# Patient Record
Sex: FEMALE | Race: WHITE | Employment: FULL TIME | URBAN - METROPOLITAN AREA
[De-identification: names, ages, dates, MRNs, and addresses within clinical notes are randomized per-mention and may not be internally consistent; named-entity substitution may affect disease eponyms.]

---

## 2020-01-10 ENCOUNTER — OFFICE VISIT (OUTPATIENT)
Dept: FAMILY MEDICINE CLINIC | Age: 50
End: 2020-01-10

## 2020-01-10 VITALS
HEART RATE: 99 BPM | RESPIRATION RATE: 18 BRPM | WEIGHT: 281.6 LBS | DIASTOLIC BLOOD PRESSURE: 81 MMHG | SYSTOLIC BLOOD PRESSURE: 102 MMHG | HEIGHT: 65 IN | TEMPERATURE: 96 F | OXYGEN SATURATION: 99 % | BODY MASS INDEX: 46.92 KG/M2

## 2020-01-10 DIAGNOSIS — M25.551 RIGHT HIP PAIN: ICD-10-CM

## 2020-01-10 DIAGNOSIS — Z87.39 HISTORY OF SPINAL STENOSIS: ICD-10-CM

## 2020-01-10 DIAGNOSIS — E66.01 OBESITY, MORBID (HCC): ICD-10-CM

## 2020-01-10 DIAGNOSIS — Z00.00 ROUTINE GENERAL MEDICAL EXAMINATION AT A HEALTH CARE FACILITY: ICD-10-CM

## 2020-01-10 DIAGNOSIS — Z76.89 ENCOUNTER TO ESTABLISH CARE: Primary | ICD-10-CM

## 2020-01-10 DIAGNOSIS — Z12.39 BREAST CANCER SCREENING: ICD-10-CM

## 2020-01-10 LAB — HBA1C MFR BLD HPLC: 5.7 %

## 2020-01-10 RX ORDER — IBUPROFEN 200 MG
TABLET ORAL
COMMUNITY
End: 2020-08-19 | Stop reason: ALTCHOICE

## 2020-01-10 RX ORDER — DICLOFENAC SODIUM 75 MG/1
75 TABLET, DELAYED RELEASE ORAL 2 TIMES DAILY
Qty: 60 TAB | Refills: 1 | Status: SHIPPED | OUTPATIENT
Start: 2020-01-10 | End: 2020-03-17 | Stop reason: SDUPTHER

## 2020-01-10 NOTE — PROGRESS NOTES
HISTORY OF PRESENT ILLNESS  Mica Avila is a 52 y.o. female. HPI  Patient comes in today to establish care. PCP - Keely Craig. GYN - none. States she is constantly gaining weight. 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 She tries to eat healthy. Watches calories, carbohydrates. Has bene on multiple diets, used [employer] to monitor weight. Does not recall having thyroid levels checked. Can't walk due to pain in right hip. Walks with a limp, cannot walk normal.  Hx of spinal stenosis. Cannot lift leg up while sitting in chair. She uses Advil in mornings. Has a stinging, burning sensation in hips. Cannot do a lunge. States he heard something pop when she was sitting in a \"w\" position in floor couple years ago. States she went to ED, was told pulled muscle. Hip pain progressively gotten worse since then. She used to walk 3 miles daily. Unable to use exercise bike for very long. Has not had MRI done. Thinks she had a CT scan. She has done PT for hip pain. Pt has not been to orthopedic in a while.    Allergies   Allergen Reactions    Other Food Swelling     Allergy to jicama root       Past Medical History:   Diagnosis Date    Spinal stenosis 2016       Past Surgical History:   Procedure Laterality Date    HX LAPAROSCOPIC SUPRACERVICAL HYSTERECTOMY  2013       Social History     Socioeconomic History    Marital status: SINGLE     Spouse name: Not on file    Number of children: Not on file    Years of education: Not on file    Highest education level: Not on file   Occupational History    Not on file   Social Needs    Financial resource strain: Not on file    Food insecurity:     Worry: Not on file     Inability: Not on file    Transportation needs:     Medical: Not on file     Non-medical: Not on file   Tobacco Use    Smoking status: Never Smoker    Smokeless tobacco: Never Used   Substance and Sexual Activity    Alcohol use: Yes     Comment: occasionally    Drug use: No    Sexual activity: Yes     Partners: Male     Birth control/protection: Surgical   Lifestyle    Physical activity:     Days per week: Not on file     Minutes per session: Not on file    Stress: Not on file   Relationships    Social connections:     Talks on phone: Not on file     Gets together: Not on file     Attends [employer] service: Not on file     Active member of club or organization: Not on file     Attends meetings of clubs or organizations: Not on file     Relationship status: Not on file    Intimate partner violence:     Fear of current or ex partner: Not on file     Emotionally abused: Not on file     Physically abused: Not on file     Forced sexual activity: Not on file   Other Topics Concern    Not on file   Social History Narrative    Not on file       Family History   Problem Relation Age of Onset    Breast Cancer Maternal Aunt        Current Outpatient Medications   Medication Sig    ibuprofen (ADVIL) 200 mg tablet Take  by mouth. 4-6 Daily.  diclofenac EC (VOLTAREN) 75 mg EC tablet Take 1 Tab by mouth two (2) times a day. As needed for hip     No current facility-administered medications for this visit. Review of Systems   Constitutional: Negative for chills, diaphoresis, fever, malaise/fatigue and weight loss. HENT: Negative for congestion, ear pain, sore throat and tinnitus. Eyes: Negative for blurred vision and double vision. Respiratory: Negative for cough, sputum production, shortness of breath and wheezing. Cardiovascular: Negative for chest pain, palpitations and leg swelling. Gastrointestinal: Negative for abdominal pain, blood in stool, constipation, diarrhea, nausea and vomiting. Genitourinary: Negative for dysuria, flank pain, frequency, hematuria and urgency. Musculoskeletal: Positive for joint pain (bilateral hip pain, right worse than left). Negative for back pain and myalgias. Skin: Negative.     Neurological: Negative for dizziness, tingling, sensory change, speech change, focal weakness and headaches. Psychiatric/Behavioral: Negative for depression. The patient is not nervous/anxious and does not have insomnia. Vitals:    01/10/20 0858   BP: 102/81   Pulse: 99   Resp: 18   Temp: 96 °F (35.6 °C)   TempSrc: Oral   SpO2: 99%   Weight: 281 lb 9.6 oz (127.7 kg)   Height: 5' 4.5\" (1.638 m)     Physical Exam  Vitals signs reviewed. Constitutional:       Appearance: Normal appearance. She is well-developed. HENT:      Right Ear: Hearing, tympanic membrane, ear canal and external ear normal.      Left Ear: Hearing, tympanic membrane, ear canal and external ear normal.      Nose: Nose normal.      Mouth/Throat:      Pharynx: Uvula midline. Neck:      Thyroid: No thyromegaly. Cardiovascular:      Rate and Rhythm: Normal rate and regular rhythm. Pulses:           Dorsalis pedis pulses are 2+ on the right side and 2+ on the left side. Heart sounds: Normal heart sounds, S1 normal and S2 normal. No murmur. Comments: No edema noted  Pulmonary:      Effort: Pulmonary effort is normal.      Breath sounds: Normal breath sounds. Abdominal:      General: Bowel sounds are normal.      Palpations: Abdomen is soft. Tenderness: There is no tenderness. Musculoskeletal:      Right hip: She exhibits decreased range of motion (with forward flexion, abduction and adduction), decreased strength, tenderness (over trochanteric bursa) and bony tenderness. She exhibits no swelling, no crepitus and no deformity. Left hip: She exhibits decreased range of motion, tenderness and bony tenderness. She exhibits normal strength, no swelling, no crepitus and no deformity. Lumbar back: Normal.      Comments: NV/sensation intact, muscle strength 4/5 right leg. Patient walks with limp   Lymphadenopathy:      Cervical: No cervical adenopathy. Upper Body:      Right upper body: No supraclavicular adenopathy.       Left upper body: No supraclavicular adenopathy. Skin:     General: Skin is warm and dry. Neurological:      Mental Status: She is alert and oriented to person, place, and time. Psychiatric:         Speech: Speech normal.         Behavior: Behavior normal. Behavior is cooperative. Thought Content: Thought content normal.     ASSESSMENT and PLAN    ICD-10-CM ICD-9-CM    1. Encounter to establish care Z76.89 V65.8    2. Routine general medical examination at a health care facility Z00.00 V70.0 CBC WITH AUTOMATED DIFF      METABOLIC PANEL, COMPREHENSIVE      TSH AND FREE T4      LIPID PANEL      AMB POC HEMOGLOBIN A1C      VITAMIN D, 25 HYDROXY      CANCELED: AMB POC URINALYSIS DIP STICK AUTO W/O MICRO   3. Obesity, morbid (HCC) E66.01 278.01 TSH AND FREE T4      LIPID PANEL      AMB POC HEMOGLOBIN A1C      VITAMIN D, 25 HYDROXY   4. Right hip pain M25.551 719.45 XR HIP RT W OR WO PELV 2-3 VWS      REFERRAL TO ORTHOPEDIC SURGERY      diclofenac EC (VOLTAREN) 75 mg EC tablet   5. History of spinal stenosis Z87.39 V13.59    6. Breast cancer screening Z12.39 V76.10 ALO MAMMO BI SCREENING INCL CAD     Encounter Diagnoses   Name Primary?  Encounter to establish care Yes    Routine general medical examination at a health care facility     Obesity, morbid ([address]     Right hip pain     History of spinal stenosis     Breast cancer screening      Orders Placed This Encounter    ALO MAMMO BI SCREENING INCL CAD    XR HIP RT W OR WO PELV 2-3 VWS    CBC WITH AUTOMATED DIFF    METABOLIC PANEL, COMPREHENSIVE    TSH AND FREE T4    LIPID PANEL    VITAMIN D, 25 HYDROXY    CVD REPORT    REFERRAL TO ORTHOPEDIC SURGERY    AMB POC HEMOGLOBIN A1C    ibuprofen (ADVIL) 200 mg tablet    diclofenac EC (VOLTAREN) 75 mg EC tablet     Diagnoses and all orders for this visit:    1. Encounter to establish care - will send medical release for records from previous PCP    2.  Routine general medical examination at a health care facility  -     CBC WITH AUTOMATED DIFF  -     METABOLIC PANEL, COMPREHENSIVE  -     TSH AND FREE T4  -     LIPID PANEL  -     AMB POC HEMOGLOBIN A1C  -     VITAMIN D, 25 HYDROXY    3. Obesity, morbid (HCC)  -     TSH AND FREE T4  -     LIPID PANEL  -     AMB POC HEMOGLOBIN A1C  -     VITAMIN D, 25 HYDROXY  -     - Provided nutritional and dietary counseling. Encouraged patient to drink water, avoid Liquid calories. Encouraged patient to make healthy dietary changes, encouraged patient to increase fruits/veggies, avoidance of fast foods, fried foods.  Encouraged patient to follow low carbohydrate diet,         - Discussed behavior modification. Discussed with patient to make small, achievable changes for better success with weight loss.  Aim for 1-2 pounds per week weight loss        - Encouraged patient to increase exercise.    -Patient to make small changes in diet and behavior, and to increase exercise. 4. Right hip pain - xray shows 1. New severe bilateral hip osteoarthritis. New right hip subluxation. 2. Given the sclerosis in the femoral heads, consider osteonecrosis as the underlying etiology. 3. No acute fracture. Chronic stress reaction in the bilateral femoral necks - patient has scheduled appt with ortho - Dr. Lisette Escamilla on 1/15/20  -     XR HIP RT W OR WO PELV 2-3 VWS; Future  -     REFERRAL TO ORTHOPEDIC SURGERY  -     diclofenac EC (VOLTAREN) 75 mg EC tablet; Take 1 Tab by mouth two (2) times a day. As needed for hip    5. History of spinal stenosis    6. Breast cancer screening  -     [facility] MAMMO BI SCREENING INCL CAD;  Future    Other orders  -     CVD REPORT      Follow-up and Dispositions    · Return in about 1 year (around 1/10/2021), or if symptoms worsen or fail to improve.       lab results and schedule of future lab studies reviewed with patient  reviewed diet, exercise and weight control  radiology results and schedule of future radiology studies reviewed with patient    I have reviewed the patient's allergies and made any necessary changes. Medical, procedural, social and family histories have been reviewed and updated as medically indicated. I have reconciled and/or revised patient medications in the EMR. I have discussed each diagnosis listed in this note with [place]rier and/or their family. I have discussed treatment options and the risk/benefit analysis of those options, including safe use of medications and possible medication side effects. Through the use of shared decision making we have agreed to the above plan. The patient has received an after-visit summary and questions were answered concerning future plans. Mica Grimes, SABASP-C    This note will not be viewable in My Point...Exactlyt.

## 2020-01-10 NOTE — PROGRESS NOTES
Chief Complaint   Patient presents with   [facility].     PCP - hillary     Pt's main concern is  with not being able to walk well with more right hip pain. Pt has not been to orthopedic in a while. Pt concerned with weight gain. Pt thinks due to hysterectomy/hormones. 1. Have you been to the ER, urgent care clinic since your last visit? Hospitalized since your last visit? No    2. Have you seen or consulted any other health care providers outside of the [address] [place] [address] since your last visit? Include any pap smears or colon screening. No     Pt declined flu shot.      PAP - has no cervix     Health Maintenance Due   Topic Date Due    DTaP/Tdap/Td series (1 - Tdap) 01/02/1983    PAP AKA CERVICAL CYTOLOGY  01/02/1993    Influenza Age 9 to Adult  08/01/2019

## 2020-01-10 NOTE — PATIENT INSTRUCTIONS
Learning About Low-Carbohydrate Diets for Weight Loss  What is a low-carbohydrate diet? Low-carb diets avoid foods that are high in carbohydrate. These high-carb foods include pasta, bread, rice, cereal, fruits, and starchy vegetables. Instead, these diets usually have you eat foods that are high in fat and protein. Many people lose weight quickly on a low-carb diet. But the early weight loss is water. People on this diet often gain the weight back after they start eating carbs again. Not all diet plans are safe or work well. A lot of the evidence shows that low-carb diets aren't healthy. That's because these diets often don't include healthy foods like fruits and vegetables. Losing weight safely means balancing protein, fat, and carbs with every meal and snack. And low-carb diets don't always provide the vitamins, minerals, and fiber you need. If you have a serious medical condition, talk to your doctor before you try any diet. These conditions include kidney disease, heart disease, type 2 diabetes, high cholesterol, and high blood pressure. If you are pregnant, it may not be safe for your baby if you are on a low-carb diet. How can you lose weight safely? You might have heard that a diet plan helped another person lose weight. But that doesn't mean that it will work for you. It is very hard to stay on a diet that includes lots of big changes in your eating habits. If you want to get to a healthy weight and stay there, making healthy lifestyle changes will often work better than dieting. These steps can help. · Make a plan for change. Work with your doctor to create a plan that is right for you. · See a dietitian. He or she can show you how to make healthy changes in your eating habits. · Manage stress. If you have a lot of stress in your life, it can be hard to focus on making healthy changes to your daily habits. · Track your food and activity.  You are likely to do better at losing weight if you keep track of what you eat and what you do. Follow-up care is a key part of your treatment and safety. Be sure to make and go to all appointments, and call your doctor if you are having problems. It's also a good idea to know your test results and keep a list of the medicines you take. Where can you learn more? Go to http://sarath-josé luis.info/. Enter A121 in the search box to learn more about \"Learning About Low-Carbohydrate Diets for Weight Loss. \"  Current as of: November 7, 2018  Content Version: 12.2  © 3922-4756 [employer]. Care instructions adapted under license by [employer] (which disclaims liability or warranty for this information). If you have questions about a medical condition or this instruction, always ask your healthcare professional. [address] any warranty or liability for your use of this information.

## 2020-01-11 LAB
25(OH)D3+25(OH)D2 SERPL-MCNC: 16.4 NG/ML (ref 30–100)
ALBUMIN SERPL-MCNC: 4.3 G/DL (ref 3.5–5.5)
ALBUMIN/GLOB SERPL: 1.6 {RATIO} (ref 1.2–2.2)
ALP SERPL-CCNC: 77 IU/L (ref 39–117)
ALT SERPL-CCNC: 41 IU/L (ref 0–32)
AST SERPL-CCNC: 29 IU/L (ref 0–40)
BASOPHILS # BLD AUTO: 0.1 X10E3/UL (ref 0–0.2)
BASOPHILS NFR BLD AUTO: 1 %
BILIRUB SERPL-MCNC: 0.7 MG/DL (ref 0–1.2)
BUN SERPL-MCNC: 13 MG/DL (ref 6–24)
BUN/CREAT SERPL: 17 (ref 9–23)
CALCIUM SERPL-MCNC: 9.7 MG/DL (ref 8.7–10.2)
CHLORIDE SERPL-SCNC: 102 MMOL/L (ref 96–106)
CHOLEST SERPL-MCNC: 203 MG/DL (ref 100–199)
CO2 SERPL-SCNC: 22 MMOL/L (ref 20–29)
CREAT SERPL-MCNC: 0.76 MG/DL (ref 0.57–1)
EOSINOPHIL # BLD AUTO: 0.4 X10E3/UL (ref 0–0.4)
EOSINOPHIL NFR BLD AUTO: 4 %
ERYTHROCYTE [DISTWIDTH] IN BLOOD BY AUTOMATED COUNT: 11.9 % (ref 11.7–15.4)
GLOBULIN SER CALC-MCNC: 2.7 G/DL (ref 1.5–4.5)
GLUCOSE SERPL-MCNC: 100 MG/DL (ref 65–99)
HCT VFR BLD AUTO: 41.5 % (ref 34–46.6)
HDLC SERPL-MCNC: 56 MG/DL
HGB BLD-MCNC: 14.5 G/DL (ref 11.1–15.9)
IMM GRANULOCYTES # BLD AUTO: 0 X10E3/UL (ref 0–0.1)
IMM GRANULOCYTES NFR BLD AUTO: 0 %
INTERPRETATION, 910389: NORMAL
LDLC SERPL CALC-MCNC: 124 MG/DL (ref 0–99)
LYMPHOCYTES # BLD AUTO: 2.5 X10E3/UL (ref 0.7–3.1)
LYMPHOCYTES NFR BLD AUTO: 27 %
MCH RBC QN AUTO: 34.2 PG (ref 26.6–33)
MCHC RBC AUTO-ENTMCNC: 34.9 G/DL (ref 31.5–35.7)
MCV RBC AUTO: 98 FL (ref 79–97)
MONOCYTES # BLD AUTO: 0.8 X10E3/UL (ref 0.1–0.9)
MONOCYTES NFR BLD AUTO: 8 %
NEUTROPHILS # BLD AUTO: 5.6 X10E3/UL (ref 1.4–7)
NEUTROPHILS NFR BLD AUTO: 60 %
PLATELET # BLD AUTO: 295 X10E3/UL (ref 150–450)
POTASSIUM SERPL-SCNC: 4.6 MMOL/L (ref 3.5–5.2)
PROT SERPL-MCNC: 7 G/DL (ref 6–8.5)
RBC # BLD AUTO: 4.24 X10E6/UL (ref 3.77–5.28)
SODIUM SERPL-SCNC: 141 MMOL/L (ref 134–144)
T4 FREE SERPL-MCNC: 1.19 NG/DL (ref 0.82–1.77)
TRIGL SERPL-MCNC: 114 MG/DL (ref 0–149)
TSH SERPL DL<=0.005 MIU/L-ACNC: 2.46 UIU/ML (ref 0.45–4.5)
VLDLC SERPL CALC-MCNC: 23 MG/DL (ref 5–40)
WBC # BLD AUTO: 9.3 X10E3/UL (ref 3.4–10.8)

## 2020-01-12 DIAGNOSIS — E55.9 VITAMIN D DEFICIENCY: Primary | ICD-10-CM

## 2020-01-12 RX ORDER — ERGOCALCIFEROL 1.25 MG/1
50000 CAPSULE ORAL
Qty: 12 CAP | Refills: 2 | Status: SHIPPED | OUTPATIENT
Start: 2020-01-12 | End: 2021-09-15

## 2020-01-12 NOTE — PROGRESS NOTES
RECOMMENDATIONS:  Diabetes and thyroid screen negative. Kidney function normal.  Blood counts normal (not anemic). Liver function test slightly elevated. This could be related to weight (\"fatty liver\"). Work on weight reduction and we can recheck in 6 months. Avoid excessive tylenol and alcohol intake. Cholesterol numbers elevated. According to the American Heart Association and the [employer] of Cardiology, your 10-year risk of a heart attack or stroke is 0.7%. They do not recommend treatment with cholesterol medication until that risk is above 7.5%. I have enclosed information about diet and lifestyle to help lower cholesterol - try to limit the amount of fried foods, fatty foods, butter, gravy, red meat, ice cream, cheese, and eggs in your diet, which are all high in cholesterol. Plan to recheck in 1 year. Make sure you are fasting (no food, only water) for 8-12 hours before your appointment. Vitamin D is low. Please fill prescription for Vitamin D to take once weekly for 12 weeks. I have enclosed some information on Vitamin D deficiency.

## 2020-01-15 ENCOUNTER — OFFICE VISIT (OUTPATIENT)
Dept: ORTHOPEDIC SURGERY | Age: 50
End: 2020-01-15

## 2020-01-15 VITALS
TEMPERATURE: 97.7 F | OXYGEN SATURATION: 97 % | HEIGHT: 65 IN | WEIGHT: 280 LBS | SYSTOLIC BLOOD PRESSURE: 100 MMHG | DIASTOLIC BLOOD PRESSURE: 67 MMHG | HEART RATE: 90 BPM | BODY MASS INDEX: 46.65 KG/M2

## 2020-01-15 DIAGNOSIS — M16.0 BILATERAL PRIMARY OSTEOARTHRITIS OF HIP: Primary | ICD-10-CM

## 2020-01-15 RX ORDER — TRAMADOL HYDROCHLORIDE 50 MG/1
50 TABLET ORAL
Qty: 45 TAB | Refills: 0 | Status: SHIPPED | OUTPATIENT
Start: 2020-01-15 | End: 2020-01-16 | Stop reason: ALTCHOICE

## 2020-01-15 NOTE — PROGRESS NOTES
1/15/2020    Chief Complaint: Right hip pain    HPI: This is a 52 y.o. female who complains of right hip pain which is activity dependent. The patient has had activity dependent pain for 1 year. The patient has tried activity modification, she has tried unsuccessful physical therapy, injections have not been attempted. The pain is in the groin bilaterally, right worse than left, it is severe in intensity. The pain is in the groin and causes some limitation in the normal activities of daily living. The patient denies any numbness, weakness, tingling, fevers, chills, nausea, vomiting, fevers, chills, nausea, vomiting. Past Medical History:   Diagnosis Date    Spinal stenosis 2016       Past Surgical History:   Procedure Laterality Date    HX LAPAROSCOPIC SUPRACERVICAL HYSTERECTOMY  2013       Current Outpatient Medications on File Prior to Visit   Medication Sig Dispense Refill    ergocalciferol (ERGOCALCIFEROL) 50,000 unit capsule Take 1 Cap by mouth every seven (7) days. 12 Cap 2    ibuprofen (ADVIL) 200 mg tablet Take  by mouth. 4-6 Daily.  diclofenac EC (VOLTAREN) 75 mg EC tablet Take 1 Tab by mouth two (2) times a day. As needed for hip 60 Tab 1     No current facility-administered medications on file prior to visit.         Allergies   Allergen Reactions    Other Food Swelling     Allergy to jicama root       Family History   Problem Relation Age of Onset    Breast Cancer Maternal Aunt        Social History     Socioeconomic History    Marital status: SINGLE     Spouse name: Not on file    Number of children: Not on file    Years of education: Not on file    Highest education level: Not on file   Tobacco Use    Smoking status: Never Smoker    Smokeless tobacco: Never Used   Substance and Sexual Activity    Alcohol use: Yes     Comment: occasionally    Drug use: No    Sexual activity: Yes     Partners: Male     Birth control/protection: Surgical         Review of Systems: General: Denies headache, lethargy, fever, weight loss  Ears/Nose/Throat: Denies ear discharge, drainage, nosebleeds, hoarse voice, dental problems  Cardiovascular: Denies chest pain, shortness of breath  Lungs: Denies chest pain, breathing problems, wheezing, pneumonia  Stomach: Denies stomach pain, heartburn, constipation, irritable bowel  Skin: Denies rash, sores, open wounds  Musculoskeletal: Admits to joint pain and swelling, this pain is severe and causes difficulty walking. This pain is in the groin on the right side, it is made better by rest.   There is decreased mobility, and some difficulty doing normal activities of daily living secondary to the pain. Genitourinary: Denies dysuria, hematuria, polyuria  Gastrointestinal: Denies constipation, obstipation, diarrhea  Neurological: Denies changes in sight, smell, hearing, taste, seizures. Denies loss of consciousness. Psychiatric: Denies depression, sleep pattern changes, anxiety, change in personality  Endocrine: Denies mood swings, heat or cold intolerance  Hematologic/Lymphatic: Denies anemia, purpura, petechia  Allergic/Immunologic: Denies swelling of throat, pain or swelling at lymph nodes      Physical Examination:      General: AOX3, no apparent distress  Psychiatric: mood and affect appropriate  Lungs: clear to auscultation bilaterally  Heart: regular rate and rhythm  Abdomen: no guarding  Head: normocephalic, atraumatic  Skin: No significant abnormalities, good turgor  Sensation intact to light touch: L1-S1 dermatomes  Muscular exam: 5/5 strength in all major muscle groups unless noted in specialty exam.    Extremities      Left upper extremity: Full active and passive range of motion without pain, deformity, no open wound, strength 5/5 in all major muscle groups. Right upper extremity: Full active and passive range of motion without pain, deformity, no open wound, strength 5/5 in all major muscle groups.     Left lower extremity: Full active and passive range of motion without pain, deformity, no open wound, strength 5/5 in all major muscle groups. Right lower extremity:  No deformity is noted. Circumduction of the hip causes  pain in the groin, reproductive of the chief complaint. Range of motion is limited, with a positive impingement sign. YULIA test is positive. Gait is antalgic. No tenderness to palpation on the lateral aspect of the hip. Sensation is intact to light touch in the L1-S1 dermatomes. Skin is intact about the hip. Hip flexion and abduction strength is 5/5, hip extension and knee extension as well as tibialis anterior and EHL/GCS are 5/5 strength. Diagnostics:    Pertinent Xrays:  Pelvis and hip xrays indicate severe bone on bone arthrosis of the bilateral hips. Assessment: Unilateral Primary Osteoarthritis, Right Hip    Plan: This patient does have symptomatic hip osteoarthritis. We did discuss the general management of osteoarthritis, which is largely nonsurgical.   We discussed that activity modification, weight loss, weight bearing exercises, physical therapy, over the counter medications, prescription medications, ambulatory aids, intra-articular injections, and pain management modalities are the treatment of choice. Only once these fail, would we consider surgery. The patient stated their understanding of the pathology, as well as their choices. We decided to proceed with injections, weight loss, and tramadol. Procedures:  PROCEDURE NOTE:    After consent was obtained, the right hip was visualized under direct ultrasound guidance, once I had confirmation of direct visualization of the head neck junction, skin at the anterior lateral hip was prepped with chlorhexidine. Under direct visualization, 1% lidocaine was injected into the subcutaneous tissues as well as into the capsule of the hip.   Needle remained in place 6 mg of betamethasone as well as 1% lidocaine were injected into the hip joint itself, there was good filling of the capsule itself as noted by direct visualization. Images were saved. Once this was completed, the needle was extracted, sterile dressing was applied. The patient tolerated well. Postinjection instructions were given. Follow up will be 6 weeks        Ms. Nazario has a reminder for a \"due or due soon\" health maintenance. I have asked that she contact her primary care provider for follow-up on this health maintenance.

## 2020-01-15 NOTE — PROGRESS NOTES
Identified pt with two pt identifiers (name and ). Reviewed chart in preparation for visit and have obtained necessary documentation. Noble Cabrera is a 52 y.o. female  Chief Complaint   Patient presents with    Hip Pain     RT hip     Visit Vitals  /67 (BP 1 Location: Left arm, BP Patient Position: Sitting)   Pulse 90   Temp 97.7 °F (36.5 °C) (Oral)   Ht 5' 4.5\" (1.638 m)   Wt 280 lb (127 kg)   LMP 2011   SpO2 97%   BMI 47.32 kg/m²     1. Have you been to the ER, urgent care clinic since your last visit? Hospitalized since your last visit? No    2. Have you seen or consulted any other health care providers outside of the [address] [place] [address] since your last visit? Include any pap smears or colon screening.  No

## 2020-01-16 DIAGNOSIS — M16.0 BILATERAL PRIMARY OSTEOARTHRITIS OF HIP: Primary | ICD-10-CM

## 2020-01-16 RX ORDER — TRAMADOL HYDROCHLORIDE 50 MG/1
50 TABLET ORAL
Qty: 28 TAB | Refills: 0 | Status: SHIPPED | OUTPATIENT
Start: 2020-01-16 | End: 2020-01-23

## 2020-01-22 ENCOUNTER — HOSPITAL ENCOUNTER (OUTPATIENT)
Dept: MAMMOGRAPHY | Age: 50
Discharge: HOME | End: 2020-01-22
Payer: COMMERCIAL

## 2020-01-22 DIAGNOSIS — Z12.39 BREAST CANCER SCREENING: ICD-10-CM

## 2020-01-22 PROCEDURE — 77067 SCR MAMMO BI INCL CAD: CPT

## 2020-02-26 ENCOUNTER — OFFICE VISIT (OUTPATIENT)
Dept: ORTHOPEDIC SURGERY | Age: 50
End: 2020-02-26

## 2020-02-26 VITALS
HEIGHT: 65 IN | WEIGHT: 277 LBS | DIASTOLIC BLOOD PRESSURE: 82 MMHG | OXYGEN SATURATION: 96 % | BODY MASS INDEX: 46.15 KG/M2 | HEART RATE: 83 BPM | SYSTOLIC BLOOD PRESSURE: 130 MMHG

## 2020-02-26 DIAGNOSIS — E66.01 CLASS 3 SEVERE OBESITY WITH BODY MASS INDEX (BMI) OF 45.0 TO 49.9 IN ADULT, UNSPECIFIED OBESITY TYPE, UNSPECIFIED WHETHER SERIOUS COMORBIDITY PRESENT (HCC): ICD-10-CM

## 2020-02-26 DIAGNOSIS — M16.0 BILATERAL PRIMARY OSTEOARTHRITIS OF HIP: Primary | ICD-10-CM

## 2020-02-26 RX ORDER — BETAMETHASONE SODIUM PHOSPHATE AND BETAMETHASONE ACETATE 3; 3 MG/ML; MG/ML
6 INJECTION, SUSPENSION INTRA-ARTICULAR; INTRALESIONAL; INTRAMUSCULAR; SOFT TISSUE ONCE
Qty: 1 ML | Refills: 0
Start: 2020-02-26 | End: 2020-02-26

## 2020-02-26 RX ORDER — TRAMADOL HYDROCHLORIDE 50 MG/1
50 TABLET ORAL
COMMUNITY
End: 2020-03-17 | Stop reason: SDUPTHER

## 2020-02-26 NOTE — PROGRESS NOTES
Identified pt with two pt identifiers (name and ). Reviewed chart in preparation for visit and have obtained necessary documentation. Kishan George is a 52 y.o. female  Chief Complaint   Patient presents with    Hip Pain     RT hip 6 wk f/u     Visit Vitals  /82 (BP 1 Location: Right arm, BP Patient Position: Sitting)   Pulse 83   Ht 5' 4.5\" (1.638 m)   Wt 277 lb (125.6 kg)   LMP 2011   SpO2 96%   BMI 46.81 kg/m²     1. Have you been to the ER, urgent care clinic since your last visit? Hospitalized since your last visit? No    2. Have you seen or consulted any other health care providers outside of the [address] [place] [address] since your last visit? Include any pap smears or colon screening.  No

## 2020-02-26 NOTE — PROGRESS NOTES
2/26/2020    Chief Complaint: Left hip pain    HPI: This is a 52y.o. year old female who  complains of left hip pain which is activity dependent. The patient has had activity dependent pain for years. The patient has tried activity modification, some physical therapy, injections on right have helped, none on left. The pain is in the groin, it is severe in intensity. The pain is in the groin and causes moderate limitation in the normal activities of daily living. The patient denies any numbness, weakness, tingling, fevers, chills, nausea, vomiting, fevers, chills, nausea, vomiting. Past Medical History:   Diagnosis Date    Spinal stenosis 2016       Past Surgical History:   Procedure Laterality Date    HX LAPAROSCOPIC SUPRACERVICAL HYSTERECTOMY  2013       Current Outpatient Medications on File Prior to Visit   Medication Sig Dispense Refill    traMADol (ULTRAM) 50 mg tablet Take 50 mg by mouth every six (6) hours as needed for Pain.  ergocalciferol (ERGOCALCIFEROL) 50,000 unit capsule Take 1 Cap by mouth every seven (7) days. 12 Cap 2    ibuprofen (ADVIL) 200 mg tablet Take  by mouth. 4-6 Daily.  diclofenac EC (VOLTAREN) 75 mg EC tablet Take 1 Tab by mouth two (2) times a day. As needed for hip 60 Tab 1     No current facility-administered medications on file prior to visit.         Allergies   Allergen Reactions    Other Food Swelling     Allergy to jicama root       Family History   Problem Relation Age of Onset    Breast Cancer Maternal Aunt         age unknown       Social History     Socioeconomic History    Marital status: SINGLE     Spouse name: Not on file    Number of children: Not on file    Years of education: Not on file    Highest education level: Not on file   Tobacco Use    Smoking status: Never Smoker    Smokeless tobacco: Never Used   Substance and Sexual Activity    Alcohol use: Yes     Comment: occasionally    Drug use: No    Sexual activity: Yes     Partners: Male     Birth control/protection: Surgical         Review of Systems:       General: Denies headache, lethargy, fever, weight loss  Ears/Nose/Throat: Denies ear discharge, drainage, nosebleeds, hoarse voice, dental problems  Cardiovascular: Denies chest pain, shortness of breath  Lungs: Denies chest pain, breathing problems, wheezing, pneumonia  Stomach: Denies stomach pain, heartburn, constipation, irritable bowel  Skin: Denies rash, sores, open wounds  Musculoskeletal: Admits to joint pain and swelling, this pain is severe and causes difficulty walking. This pain is in the groin on the left side, it is associated with activity, made better by rest.   There is decreased mobility, and severe difficulty doing normal activities of daily living secondary to the pain. Genitourinary: Denies dysuria, hematuria, polyuria  Gastrointestinal: Denies constipation, obstipation, diarrhea  Neurological: Denies changes in sight, smell, hearing, taste, seizures. Denies loss of consciousness.   Psychiatric: Denies depression, sleep pattern changes, anxiety, change in personality  Endocrine: Denies mood swings, heat or cold intolerance  Hematologic/Lymphatic: Denies anemia, purpura, petechia  Allergic/Immunologic: Denies swelling of throat, pain or swelling at lymph nodes      Physical Examination:    Visit Vitals  /82 (BP 1 Location: Right arm, BP Patient Position: Sitting)   Pulse 83   Ht 5' 4.5\" (1.638 m)   Wt 277 lb (125.6 kg)   SpO2 96%   BMI 46.81 kg/m²        General: AOX3, no apparent distress  Psychiatric: mood and affect appropriate  Lungs: breathing is symmetric and unlabored bilaterally  Heart: regular rate and rhythm  Abdomen: no guarding  Head: normocephalic, atraumatic  Skin: No significant abnormalities, good turgor  Sensation intact to light touch: L1-S1 dermatomes  Muscular exam: 5/5 strength in all major muscle groups unless noted in specialty exam.    Extremities    Left upper extremity: Full active and passive range of motion without pain, deformity, no open wound, strength 5/5 in all major muscle groups. Right upper extremity: Full active and passive range of motion without pain, deformity, no open wound, strength 5/5 in all major muscle groups. Right lower extremity: Full active and passive range of motion without pain, deformity, no open wound, strength 5/5 in all major muscle groups. Left lower extremity:  No deformity is noted. Circumduction of the hip causes severe pain in the groin, reproductive of the chief complaint. Range of motion is limited, with a positive impingement sign. YULIA test is positive. Gait is antalgic. Some tenderness to palpation on the lateral aspect of the hip. Sensation is intact to light touch in the L1-S1 dermatomes. Skin is intact about the hip. Hip flexion and abduction strength is 5/5, hip extension and knee extension as well as tibialis anterior and EHL/GCS are 5/5 strength. Diagnostics:    Pertinent Xrays:  Xrays are available of the bilateral hip, they indicate severe bilateral osteoarthritis of the femoroacetabular joints, no significant other findings, no other osseus abnormalities, fractures, or dislocations. Assessment: Unilateral primary osteoarthritis left hip    Plan: This patient does have symptomatic hip osteoarthritis. We did discuss the general management of osteoarthritis, which is largely nonsurgical.   We discussed that activity modification, weight loss, weight bearing exercises, physical therapy, over the counter medications, prescription medications, ambulatory aids, intra-articular injections, and pain management modalities are the treatment of choice. Only once these fail, would we consider surgery. The patient stated their understanding of the pathology, as well as their choices.   We decided to proceed with injection on the left, weight loss referral (goal of losing 37 pounds), and pain medications. Procedures:  PROCEDURE NOTE:    After consent was obtained, the left hip was visualized under direct ultrasound guidance, once I had confirmation of direct visualization of the head neck junction, skin at the anterior lateral hip was prepped with chlorhexidine. Under direct visualization, 1% lidocaine was injected into the subcutaneous tissues as well as into the capsule of the hip. Needle remained in place 6 mg of betamethasone as well as 1% lidocaine were injected into the hip joint itself, there was good filling of the capsule itself as noted by direct visualization. Images were saved. Once this was completed, the needle was extracted, sterile dressing was applied. The patient tolerated well. Postinjection instructions were given. Follow up will be 2 months        Ms. Nazario has a reminder for a \"due or due soon\" health maintenance. I have asked that she contact her primary care provider for follow-up on this health maintenance.

## 2020-03-17 DIAGNOSIS — M25.551 RIGHT HIP PAIN: ICD-10-CM

## 2020-03-17 RX ORDER — DICLOFENAC SODIUM 75 MG/1
75 TABLET, DELAYED RELEASE ORAL 2 TIMES DAILY
Qty: 60 TAB | Refills: 1 | Status: SHIPPED | OUTPATIENT
Start: 2020-03-17 | End: 2020-06-08 | Stop reason: SDUPTHER

## 2020-03-17 RX ORDER — TRAMADOL HYDROCHLORIDE 50 MG/1
50 TABLET ORAL
Qty: 42 TAB | Refills: 0 | Status: SHIPPED | OUTPATIENT
Start: 2020-03-17 | End: 2020-06-08 | Stop reason: SDUPTHER

## 2020-06-08 DIAGNOSIS — M25.551 RIGHT HIP PAIN: ICD-10-CM

## 2020-06-09 RX ORDER — TRAMADOL HYDROCHLORIDE 50 MG/1
50 TABLET ORAL
Qty: 42 TAB | Refills: 0 | Status: SHIPPED | OUTPATIENT
Start: 2020-06-09 | End: 2020-06-16

## 2020-06-09 RX ORDER — DICLOFENAC SODIUM 75 MG/1
75 TABLET, DELAYED RELEASE ORAL 2 TIMES DAILY
Qty: 60 TAB | Refills: 1 | Status: SHIPPED | OUTPATIENT
Start: 2020-06-09 | End: 2020-08-19 | Stop reason: SDUPTHER

## 2020-08-05 ENCOUNTER — OFFICE VISIT (OUTPATIENT)
Dept: ORTHOPEDIC SURGERY | Age: 50
End: 2020-08-05
Payer: COMMERCIAL

## 2020-08-05 VITALS
WEIGHT: 283 LBS | HEART RATE: 66 BPM | HEIGHT: 65 IN | OXYGEN SATURATION: 96 % | TEMPERATURE: 97.6 F | BODY MASS INDEX: 47.15 KG/M2 | SYSTOLIC BLOOD PRESSURE: 137 MMHG | DIASTOLIC BLOOD PRESSURE: 97 MMHG

## 2020-08-05 DIAGNOSIS — M16.0 BILATERAL PRIMARY OSTEOARTHRITIS OF HIP: Primary | ICD-10-CM

## 2020-08-05 PROCEDURE — 99213 OFFICE O/P EST LOW 20 MIN: CPT | Performed by: ORTHOPAEDIC SURGERY

## 2020-08-05 RX ORDER — HYDROCODONE BITARTRATE AND ACETAMINOPHEN 5; 325 MG/1; MG/1
1 TABLET ORAL
Qty: 42 TAB | Refills: 0 | Status: SHIPPED | OUTPATIENT
Start: 2020-08-05 | End: 2020-08-12

## 2020-08-05 NOTE — PROGRESS NOTES
Identified pt with two pt identifiers (name and ). Reviewed chart in preparation for visit and have obtained necessary documentation. Scottie Young is a 52 y.o. female  Chief Complaint   Patient presents with    Joint Or Tendon Injection     Bilateral hip     Visit Vitals  BP (!) 137/97 (BP 1 Location: Right arm, BP Patient Position: Sitting)   Pulse 66   Temp 97.6 °F (36.4 °C) (Tympanic)   Ht 5' 4.5\" (1.638 m)   Wt 283 lb (128.4 kg)   LMP 2011   SpO2 96%   BMI 47.83 kg/m²     1. Have you been to the ER, urgent care clinic since your last visit? Hospitalized since your last visit? No    2. Have you seen or consulted any other health care providers outside of the [address] [place] [address] since your last visit? Include any pap smears or colon screening.  No

## 2020-08-06 ENCOUNTER — TELEPHONE (OUTPATIENT)
Dept: FAMILY MEDICINE CLINIC | Age: 50
End: 2020-08-06

## 2020-08-06 NOTE — TELEPHONE ENCOUNTER
Verified patient with two type of identifiers. Pt states would like to think about it and will call back to schedule.

## 2020-08-06 NOTE — TELEPHONE ENCOUNTER
Patient wants to talk with you regarding getting on a medication.   Please give her a call @ [phone]

## 2020-08-06 NOTE — TELEPHONE ENCOUNTER
Verified patient with two type of identifiers. Pt states cannot have hip surgery until she looses at least 30 lbs. Pt inquiring if PCP could recommend a diet pill to help. Pt also wondering if \"due to hysterectomy, could not having estrogen make her hold weight. \" Pt states has not been on a strict diet regimen but has started Keto again today and wants to be able to loose the weight fast to get the surgery and relief.

## 2020-08-06 NOTE — PROGRESS NOTES
8/5/2020    Chief Complaint: Right hip pain    HPI: This is a 52 y.o. female who complains of right hip pain which is activity dependent. The patient has had activity dependent pain for years. The patient has tried activity modification, she has tried physical therapy, injections have not been attempted. The pain is in the groin , it is severe in intensity. The pain is in the groin and causes some limitation in the normal activities of daily living. The patient denies any numbness, weakness, tingling, fevers, chills, nausea, vomiting, fevers, chills, nausea, vomiting. Past Medical History:   Diagnosis Date    Spinal stenosis 2016       Past Surgical History:   Procedure Laterality Date    HX LAPAROSCOPIC SUPRACERVICAL HYSTERECTOMY  2013       Current Outpatient Medications on File Prior to Visit   Medication Sig Dispense Refill    diclofenac EC (VOLTAREN) 75 mg EC tablet Take 1 Tab by mouth two (2) times a day. As needed for hip 60 Tab 1    ergocalciferol (ERGOCALCIFEROL) 50,000 unit capsule Take 1 Cap by mouth every seven (7) days. 12 Cap 2    ibuprofen (ADVIL) 200 mg tablet Take  by mouth. 4-6 Daily. No current facility-administered medications on file prior to visit.         Allergies   Allergen Reactions    Other Food Swelling     Allergy to jicama root       Family History   Problem Relation Age of Onset    Breast Cancer Maternal Aunt         age unknown       Social History     Socioeconomic History    Marital status: SINGLE     Spouse name: Not on file    Number of children: Not on file    Years of education: Not on file    Highest education level: Not on file   Tobacco Use    Smoking status: Never Smoker    Smokeless tobacco: Never Used   Substance and Sexual Activity    Alcohol use: Yes     Comment: occasionally    Drug use: No    Sexual activity: Yes     Partners: Male     Birth control/protection: Surgical         Review of Systems:       General: Denies headache, lethargy, fever, weight loss  Ears/Nose/Throat: Denies ear discharge, drainage, nosebleeds, hoarse voice, dental problems  Cardiovascular: Denies chest pain, shortness of breath  Lungs: Denies chest pain, breathing problems, wheezing, pneumonia  Stomach: Denies stomach pain, heartburn, constipation, irritable bowel  Skin: Denies rash, sores, open wounds  Musculoskeletal: Admits to joint pain and swelling, this pain is severe and causes difficulty walking. This pain is in the groin on the right side, it is severe, made better by rest.   There is decreased mobility, and severe difficulty doing normal activities of daily living secondary to the pain. Genitourinary: Denies dysuria, hematuria, polyuria  Gastrointestinal: Denies constipation, obstipation, diarrhea  Neurological: Denies changes in sight, smell, hearing, taste, seizures. Denies loss of consciousness. Psychiatric: Denies depression, sleep pattern changes, anxiety, change in personality  Endocrine: Denies mood swings, heat or cold intolerance  Hematologic/Lymphatic: Denies anemia, purpura, petechia  Allergic/Immunologic: Denies swelling of throat, pain or swelling at lymph nodes      Physical Examination:      General: AOX3, no apparent distress  Psychiatric: mood and affect appropriate  Lungs: clear to auscultation bilaterally  Heart: regular rate and rhythm  Abdomen: no guarding  Head: normocephalic, atraumatic  Skin: No significant abnormalities, good turgor  Sensation intact to light touch: L1-S1 dermatomes  Muscular exam: 5/5 strength in all major muscle groups unless noted in specialty exam.    Extremities      Left upper extremity: Full active and passive range of motion without pain, deformity, no open wound, strength 5/5 in all major muscle groups. Right upper extremity: Full active and passive range of motion without pain, deformity, no open wound, strength 5/5 in all major muscle groups.     Left lower extremity: Full active and passive range of motion without pain, deformity, no open wound, strength 5/5 in all major muscle groups. Right lower extremity:  No deformity is noted. Circumduction of the hip causes severe pain in the groin, reproductive of the chief complaint. Range of motion is limited, with a severe impingement sign. YULIA test is positive. Gait is antalgic. Minimal tenderness to palpation on the lateral aspect of the hip. Sensation is intact to light touch in the L1-S1 dermatomes. Skin is intact about the hip. Hip flexion and abduction strength is 5/5, hip extension and knee extension as well as tibialis anterior and EHL/GCS are 5/5 strength. Diagnostics:    Pertinent Xrays: Xrays are available of the bilateral hip, they indicate severe osteoarthritis of the femoroacetabular joints, no significant other findings, no other osseus abnormalities, fractures, or dislocations. Assessment: Unilateral Primary Osteoarthritis, Right Hip    Plan: This patient does have symptomatic hip osteoarthritis. We did discuss the general management of osteoarthritis, which is largely nonsurgical.   We discussed that activity modification, weight loss, weight bearing exercises, physical therapy, over the counter medications, prescription medications, ambulatory aids, intra-articular injections, and pain management modalities are the treatment of choice. Only once these fail, would we consider surgery. The patient stated their understanding of the pathology, as well as their choices. We decided to proceed with weight loss, aggressive pain control, and plan for arthroplasty once weight is in acceptable range. Procedures:  none      Follow up will be 2 weeks for weight check and discussion. Ms. Fady Ramirez has a reminder for a \"due or due soon\" health maintenance. I have asked that she contact her primary care provider for follow-up on this health maintenance.

## 2020-08-19 ENCOUNTER — OFFICE VISIT (OUTPATIENT)
Dept: ORTHOPEDIC SURGERY | Age: 50
End: 2020-08-19
Payer: COMMERCIAL

## 2020-08-19 VITALS
TEMPERATURE: 97.2 F | BODY MASS INDEX: 46.48 KG/M2 | SYSTOLIC BLOOD PRESSURE: 124 MMHG | HEART RATE: 81 BPM | WEIGHT: 279 LBS | OXYGEN SATURATION: 96 % | DIASTOLIC BLOOD PRESSURE: 85 MMHG | HEIGHT: 65 IN

## 2020-08-19 DIAGNOSIS — M16.0 BILATERAL PRIMARY OSTEOARTHRITIS OF HIP: Primary | ICD-10-CM

## 2020-08-19 DIAGNOSIS — M25.551 RIGHT HIP PAIN: ICD-10-CM

## 2020-08-19 PROCEDURE — 99212 OFFICE O/P EST SF 10 MIN: CPT | Performed by: ORTHOPAEDIC SURGERY

## 2020-08-19 RX ORDER — DICLOFENAC SODIUM 75 MG/1
75 TABLET, DELAYED RELEASE ORAL 2 TIMES DAILY
Qty: 60 TAB | Refills: 1 | Status: SHIPPED | OUTPATIENT
Start: 2020-08-19 | End: 2020-10-26 | Stop reason: SDUPTHER

## 2020-08-19 NOTE — PROGRESS NOTES
8/19/2020      CC: bilateral hip pain    HPI:      This is a 52y.o. year old female who presents for a follow up visit. The patient was last seen and diagnosed with obesity, bilateral hip OA. The patient's treatments since the most recent visit have comprised of weight loss, pain control. The patient has had some relief of the chief complaint. Pain medications help, she is working on weight loss. PMH:  Past Medical History:   Diagnosis Date    Spinal stenosis 2016       PSxHx:  Past Surgical History:   Procedure Laterality Date    HX LAPAROSCOPIC SUPRACERVICAL HYSTERECTOMY  2013       Meds:    Current Outpatient Medications:     diclofenac EC (VOLTAREN) 75 mg EC tablet, Take 1 Tab by mouth two (2) times a day. As needed for hip, Disp: 60 Tab, Rfl: 1    ergocalciferol (ERGOCALCIFEROL) 50,000 unit capsule, Take 1 Cap by mouth every seven (7) days. , Disp: 12 Cap, Rfl: 2    ibuprofen (ADVIL) 200 mg tablet, Take  by mouth. 4-6 Daily. , Disp: , Rfl:     All:  Allergies   Allergen Reactions    Other Food Swelling     Allergy to jicama root       Social Hx:  Social History     Socioeconomic History    Marital status: SINGLE     Spouse name: Not on file    Number of children: Not on file    Years of education: Not on file    Highest education level: Not on file   Tobacco Use    Smoking status: Never Smoker    Smokeless tobacco: Never Used   Substance and Sexual Activity    Alcohol use: Yes     Comment: occasionally    Drug use: No    Sexual activity: Yes     Partners: Male     Birth control/protection: Surgical       Family Hx:  Family History   Problem Relation Age of Onset    Breast Cancer Maternal Aunt         age unknown         Review of Systems:       General: Denies headache, lethargy, fever, weight loss  Ears/Nose/Throat: Denies ear discharge, drainage, nosebleeds, hoarse voice, dental problems  Cardiovascular: Denies chest pain, shortness of breath  Lungs: Denies chest pain, breathing problems, wheezing, pneumonia  Stomach: Denies stomach pain, heartburn, constipation, irritable bowel  Skin: Denies rash, sores, open wounds  Musculoskeletal: hip pain  Genitourinary: Denies dysuria, hematuria, polyuria  Gastrointestinal: Denies constipation, obstipation, diarrhea  Neurological: Denies changes in sight, smell, hearing, taste, seizures. Denies loss of consciousness. Psychiatric: Denies depression, sleep pattern changes, anxiety, change in personality  Endocrine: Denies mood swings, heat or cold intolerance  Hematologic/Lymphatic: Denies anemia, purpura, petechia  Allergic/Immunologic: Denies swelling of throat, pain or swelling at lymph nodes      Physical Examination:    Visit Vitals  /85 (BP 1 Location: Right arm, BP Patient Position: Sitting)   Pulse 81   Temp 97.2 °F (36.2 °C) (Tympanic)   Ht 5' 4.5\" (1.638 m)   Wt 279 lb (126.6 kg)   SpO2 96%   BMI 47.15 kg/m²        General: AOX3, no apparent distress  Psychiatric: mood and affect appropriate  Lungs: breathing is symmetric and unlabored bilaterally  Heart: regular rate and rhythm  Abdomen: no guarding  Head: normocephalic, atraumatic  Skin: No significant abnormalities, good turgor  Sensation intact to light touch: C5-T1 dermatomes  Muscular exam: 5/5 strength in all major muscle groups unless noted in specialty exam.    Extremities      Right upper extremity: no exam  Left upper extremity:  No exam  Right lower extremity: No deformity is noted. Circumduction of the hip causes significant pain in the groin, reproductive of the chief complaint. Range of motion is limited, with a positive impingement sign. Gait is antalgic. Sensation is intact to light touch in the L1-S1 dermatomes. Skin is intact about the hip. Hip flexion and abduction strength is 4+/5, hip extension and knee extension as well as tibialis anterior and EHL/GCS are 5/5 strength. Left lower extremity:  No deformity is noted.    Circumduction of the hip causes significant pain in the groin, reproductive of the chief complaint. Range of motion is limited, with a positive impingement sign. Gait is antalgic. Sensation is intact to light touch in the L1-S1 dermatomes. Skin is intact about the hip. Hip flexion and abduction strength is 4+/5, hip extension and knee extension as well as tibialis anterior and EHL/GCS are 5/5 strength. Diagnostics:    Pertinent Diagnostics: none today    Assessment: bilateral hip OA, weight loss protocol  Plan:    Plan for 6-10 lbs weight loss over the next month. I've given her advice about weighing daily and overall healthy lifestyle. She agrees, encouragement was given. Follow up in 4 weeks. Ms. Natty Cardenas has a reminder for a \"due or due soon\" health maintenance. I have asked that she contact her primary care provider for follow-up on this health maintenance.

## 2020-09-16 ENCOUNTER — OFFICE VISIT (OUTPATIENT)
Dept: ORTHOPEDIC SURGERY | Age: 50
End: 2020-09-16
Payer: COMMERCIAL

## 2020-09-16 VITALS
OXYGEN SATURATION: 98 % | HEART RATE: 74 BPM | HEIGHT: 65 IN | BODY MASS INDEX: 45.48 KG/M2 | DIASTOLIC BLOOD PRESSURE: 64 MMHG | TEMPERATURE: 96.9 F | SYSTOLIC BLOOD PRESSURE: 115 MMHG | WEIGHT: 273 LBS

## 2020-09-16 DIAGNOSIS — M16.0 BILATERAL PRIMARY OSTEOARTHRITIS OF HIP: ICD-10-CM

## 2020-09-16 DIAGNOSIS — M25.551 RIGHT HIP PAIN: ICD-10-CM

## 2020-09-16 PROCEDURE — 99214 OFFICE O/P EST MOD 30 MIN: CPT | Performed by: ORTHOPAEDIC SURGERY

## 2020-09-16 RX ORDER — HYDROCODONE BITARTRATE AND ACETAMINOPHEN 5; 325 MG/1; MG/1
1 TABLET ORAL
Qty: 42 TAB | Refills: 0 | Status: SHIPPED | OUTPATIENT
Start: 2020-09-16 | End: 2020-10-26 | Stop reason: SDUPTHER

## 2020-09-16 RX ORDER — HYDROCODONE BITARTRATE AND ACETAMINOPHEN 5; 325 MG/1; MG/1
1 TABLET ORAL
Qty: 42 TAB | Refills: 0 | Status: CANCELLED | OUTPATIENT
Start: 2020-09-16 | End: 2020-09-23

## 2020-09-16 RX ORDER — DICLOFENAC SODIUM 75 MG/1
75 TABLET, DELAYED RELEASE ORAL 2 TIMES DAILY
Qty: 60 TAB | Refills: 1 | Status: CANCELLED | OUTPATIENT
Start: 2020-09-16

## 2020-09-16 NOTE — PROGRESS NOTES
Identified pt with two pt identifiers (name and ). Reviewed chart in preparation for visit and have obtained necessary documentation. Tamiko Elizondo is a 52 y.o. female  Chief Complaint   Patient presents with    Hip Pain     Bilateral hip     Visit Vitals  /64 (BP 1 Location: Right arm, BP Patient Position: Sitting)   Pulse 74   Temp 96.9 °F (36.1 °C) (Tympanic)   Ht 5' 4.5\" (1.638 m)   Wt 273 lb (123.8 kg)   LMP 2011   SpO2 98%   BMI 46.14 kg/m²     1. Have you been to the ER, urgent care clinic since your last visit? Hospitalized since your last visit? No    2. Have you seen or consulted any other health care providers outside of the [address] [place] [address] since your last visit? Include any pap smears or colon screening.  No

## 2020-09-17 NOTE — PROGRESS NOTES
9/16/2020      CC: bilateral hip pain    HPI:      This is a 52y.o. year old female who presents for a follow up visit. The patient was last seen and diagnosed with OA bilateral hips, obesity. The patient's treatments since the most recent visit have comprised of weight loss and pain medications. The patient has had minimal relief of the chief complaint. She has lost 4 pounds. PMH:  Past Medical History:   Diagnosis Date    Spinal stenosis 2016       PSxHx:  Past Surgical History:   Procedure Laterality Date    HX LAPAROSCOPIC SUPRACERVICAL HYSTERECTOMY  2013       Meds:    Current Outpatient Medications:     HYDROcodone-acetaminophen (Norco) 5-325 mg per tablet, Take 1 Tab by mouth every four (4) hours as needed for Pain for up to 7 days. Max Daily Amount: 6 Tabs., Disp: 42 Tab, Rfl: 0    diclofenac EC (VOLTAREN) 75 mg EC tablet, Take 1 Tab by mouth two (2) times a day. As needed for hip, Disp: 60 Tab, Rfl: 1    ergocalciferol (ERGOCALCIFEROL) 50,000 unit capsule, Take 1 Cap by mouth every seven (7) days. , Disp: 12 Cap, Rfl: 2    All:   Allergies   Allergen Reactions    Other Food Swelling     Allergy to jicama root       Social Hx:  Social History     Socioeconomic History    Marital status: SINGLE     Spouse name: Not on file    Number of children: Not on file    Years of education: Not on file    Highest education level: Not on file   Tobacco Use    Smoking status: Never Smoker    Smokeless tobacco: Never Used   Substance and Sexual Activity    Alcohol use: Yes     Comment: occasionally    Drug use: No    Sexual activity: Yes     Partners: Male     Birth control/protection: Surgical       Family Hx:  Family History   Problem Relation Age of Onset    Breast Cancer Maternal Aunt         age unknown         Review of Systems:       General: Denies headache, lethargy, fever, weight loss  Ears/Nose/Throat: Denies ear discharge, drainage, nosebleeds, hoarse voice, dental problems  Cardiovascular: Denies chest pain, shortness of breath  Lungs: Denies chest pain, breathing problems, wheezing, pneumonia  Stomach: Denies stomach pain, heartburn, constipation, irritable bowel  Skin: Denies rash, sores, open wounds  Musculoskeletal: bilateral hip pain  Genitourinary: Denies dysuria, hematuria, polyuria  Gastrointestinal: Denies constipation, obstipation, diarrhea  Neurological: Denies changes in sight, smell, hearing, taste, seizures. Denies loss of consciousness. Psychiatric: Denies depression, sleep pattern changes, anxiety, change in personality  Endocrine: Denies mood swings, heat or cold intolerance  Hematologic/Lymphatic: Denies anemia, purpura, petechia  Allergic/Immunologic: Denies swelling of throat, pain or swelling at lymph nodes      Physical Examination:    Visit Vitals  /64 (BP 1 Location: Right arm, BP Patient Position: Sitting)   Pulse 74   Temp 96.9 °F (36.1 °C) (Tympanic)   Ht 5' 4.5\" (1.638 m)   Wt 273 lb (123.8 kg)   SpO2 98%   BMI 46.14 kg/m²        General: AOX3, no apparent distress  Psychiatric: mood and affect appropriate  Lungs: breathing is symmetric and unlabored bilaterally  Heart: regular rate and rhythm  Abdomen: no guarding  Head: normocephalic, atraumatic  Skin: No significant abnormalities, good turgor  Sensation intact to light touch: C5-T1 dermatomes  Muscular exam: 5/5 strength in all major muscle groups unless noted in specialty exam.    Extremities      Right upper extremity: no exam  Left upper extremity:  No exam  Right lower extremity: No deformity is noted. Circumduction of the hip causes significant pain in the groin, reproductive of the chief complaint. Range of motion is limited, with a positive impingement sign. Gait is antalgic. Sensation is intact to light touch in the L1-S1 dermatomes. Skin is intact about the hip.   Hip flexion and abduction strength is 4+/5, hip extension and knee extension as well as tibialis anterior and EHL/GCS are 5/5 strength. Left lower extremity:  No deformity is noted. Circumduction of the hip causes significant pain in the groin, reproductive of the chief complaint. Range of motion is limited, with a positive impingement sign. Gait is antalgic. Sensation is intact to light touch in the L1-S1 dermatomes. Skin is intact about the hip. Hip flexion and abduction strength is 4+/5, hip extension and knee extension as well as tibialis anterior and EHL/GCS are 5/5 strength. Diagnostics:    Pertinent Diagnostics: none today    Assessment: Bilateral hip osteoarthritis, working on weight loss  Plan:    Continued pain medications, weight loss, goal of 5-10 lbs in the next month. She stated her understanding and satisfaction      Ms. Nazario has a reminder for a \"due or due soon\" health maintenance. I have asked that she contact her primary care provider for follow-up on this health maintenance.

## 2020-10-26 DIAGNOSIS — M25.551 RIGHT HIP PAIN: ICD-10-CM

## 2020-10-26 DIAGNOSIS — M16.0 BILATERAL PRIMARY OSTEOARTHRITIS OF HIP: ICD-10-CM

## 2020-10-27 RX ORDER — DICLOFENAC SODIUM 75 MG/1
75 TABLET, DELAYED RELEASE ORAL 2 TIMES DAILY
Qty: 60 TAB | Refills: 1 | Status: SHIPPED | OUTPATIENT
Start: 2020-10-27 | End: 2021-01-14 | Stop reason: SDUPTHER

## 2020-10-27 RX ORDER — HYDROCODONE BITARTRATE AND ACETAMINOPHEN 5; 325 MG/1; MG/1
1 TABLET ORAL
Qty: 42 TAB | Refills: 0 | Status: SHIPPED | OUTPATIENT
Start: 2020-10-27 | End: 2021-01-14 | Stop reason: SDUPTHER

## 2021-01-14 ENCOUNTER — PATIENT MESSAGE (OUTPATIENT)
Dept: ORTHOPEDIC SURGERY | Age: 51
End: 2021-01-14

## 2021-01-14 DIAGNOSIS — M16.0 BILATERAL PRIMARY OSTEOARTHRITIS OF HIP: ICD-10-CM

## 2021-01-14 DIAGNOSIS — M25.551 RIGHT HIP PAIN: ICD-10-CM

## 2021-01-14 RX ORDER — HYDROCODONE BITARTRATE AND ACETAMINOPHEN 5; 325 MG/1; MG/1
1 TABLET ORAL
Qty: 42 TAB | Refills: 0 | Status: SHIPPED | OUTPATIENT
Start: 2021-01-14 | End: 2021-04-05 | Stop reason: SDUPTHER

## 2021-01-14 RX ORDER — DICLOFENAC SODIUM 75 MG/1
75 TABLET, DELAYED RELEASE ORAL 2 TIMES DAILY
Qty: 60 TAB | Refills: 1 | Status: SHIPPED | OUTPATIENT
Start: 2021-01-14 | End: 2021-03-12 | Stop reason: SDUPTHER

## 2021-03-12 DIAGNOSIS — M25.551 RIGHT HIP PAIN: ICD-10-CM

## 2021-03-12 RX ORDER — DICLOFENAC SODIUM 75 MG/1
75 TABLET, DELAYED RELEASE ORAL 2 TIMES DAILY
Qty: 60 TAB | Refills: 1 | Status: SHIPPED | OUTPATIENT
Start: 2021-03-12 | End: 2021-04-05 | Stop reason: SDUPTHER

## 2021-04-05 DIAGNOSIS — M16.0 BILATERAL PRIMARY OSTEOARTHRITIS OF HIP: ICD-10-CM

## 2021-04-05 DIAGNOSIS — M25.551 RIGHT HIP PAIN: ICD-10-CM

## 2021-04-05 RX ORDER — DICLOFENAC SODIUM 75 MG/1
75 TABLET, DELAYED RELEASE ORAL 2 TIMES DAILY
Qty: 60 TAB | Refills: 1 | Status: SHIPPED | OUTPATIENT
Start: 2021-04-05 | End: 2021-06-01 | Stop reason: SDUPTHER

## 2021-04-05 RX ORDER — HYDROCODONE BITARTRATE AND ACETAMINOPHEN 5; 325 MG/1; MG/1
1 TABLET ORAL
Qty: 42 TAB | Refills: 0 | Status: SHIPPED | OUTPATIENT
Start: 2021-04-05 | End: 2021-06-01 | Stop reason: SDUPTHER

## 2021-06-01 ENCOUNTER — PATIENT MESSAGE (OUTPATIENT)
Dept: ORTHOPEDIC SURGERY | Age: 51
End: 2021-06-01

## 2021-06-01 DIAGNOSIS — M25.551 RIGHT HIP PAIN: ICD-10-CM

## 2021-06-01 DIAGNOSIS — M16.0 BILATERAL PRIMARY OSTEOARTHRITIS OF HIP: ICD-10-CM

## 2021-06-01 RX ORDER — HYDROCODONE BITARTRATE AND ACETAMINOPHEN 5; 325 MG/1; MG/1
1 TABLET ORAL
Qty: 42 TABLET | Refills: 0 | Status: SHIPPED | OUTPATIENT
Start: 2021-06-01 | End: 2021-08-09 | Stop reason: SDUPTHER

## 2021-06-01 RX ORDER — DICLOFENAC SODIUM 75 MG/1
75 TABLET, DELAYED RELEASE ORAL 2 TIMES DAILY
Qty: 60 TABLET | Refills: 1 | Status: SHIPPED | OUTPATIENT
Start: 2021-06-01 | End: 2021-08-09 | Stop reason: SDUPTHER

## 2021-07-19 ENCOUNTER — TELEPHONE (OUTPATIENT)
Dept: FAMILY MEDICINE CLINIC | Age: 51
End: 2021-07-19

## 2021-07-19 NOTE — TELEPHONE ENCOUNTER
Verified patient with two type of identifiers. Pt states noticed the dizziness for the last 1-2 weeks. Pt states notices it most when getting up and down at random. Pt offered appt 7/22/21 and pt declined. Pt offered appt 7/27/21 at 9:30 AM. Pt accepted. Pt denies sinus concerns usually but states has had nasal drainage recently. Pt to start antihistamine daily to see if improved by appt. Pt also would like to discuss weight loss troubles and estrogen levels. Pt encouraged to schedule for hormone concerns with a GYN. Pt verbalized understanding.

## 2021-07-19 NOTE — TELEPHONE ENCOUNTER
Patient would like a call from [facility] regarding getting in to see Trupti June soon for dizziness she can be reached @ [phone]

## 2021-08-09 DIAGNOSIS — M16.0 BILATERAL PRIMARY OSTEOARTHRITIS OF HIP: ICD-10-CM

## 2021-08-09 DIAGNOSIS — M25.551 RIGHT HIP PAIN: ICD-10-CM

## 2021-08-09 RX ORDER — HYDROCODONE BITARTRATE AND ACETAMINOPHEN 5; 325 MG/1; MG/1
1 TABLET ORAL
Qty: 42 TABLET | Refills: 0 | Status: SHIPPED | OUTPATIENT
Start: 2021-08-09 | End: 2021-11-22 | Stop reason: SDUPTHER

## 2021-08-09 RX ORDER — DICLOFENAC SODIUM 75 MG/1
75 TABLET, DELAYED RELEASE ORAL 2 TIMES DAILY
Qty: 60 TABLET | Refills: 1 | Status: SHIPPED | OUTPATIENT
Start: 2021-08-09 | End: 2021-11-22 | Stop reason: SDUPTHER

## 2021-09-10 ENCOUNTER — APPOINTMENT (OUTPATIENT)
Dept: CT IMAGING | Age: 51
DRG: 354 | End: 2021-09-10
Attending: EMERGENCY MEDICINE
Payer: COMMERCIAL

## 2021-09-10 ENCOUNTER — HOSPITAL ENCOUNTER (INPATIENT)
Age: 51
LOS: 2 days | Discharge: HOME | DRG: 354 | End: 2021-09-13
Attending: EMERGENCY MEDICINE | Admitting: SURGERY
Payer: COMMERCIAL

## 2021-09-10 DIAGNOSIS — K42.0 UMBILICAL HERNIA WITH OBSTRUCTION: Primary | ICD-10-CM

## 2021-09-10 DIAGNOSIS — E66.01 OBESITY, MORBID (HCC): ICD-10-CM

## 2021-09-10 LAB
ALBUMIN SERPL-MCNC: 3.8 G/DL (ref 3.5–5)
ALBUMIN/GLOB SERPL: 0.8 {RATIO} (ref 1.1–2.2)
ALP SERPL-CCNC: 97 U/L (ref 45–117)
ALT SERPL-CCNC: 86 U/L (ref 12–78)
ANION GAP SERPL CALC-SCNC: 7 MMOL/L (ref 5–15)
APPEARANCE UR: ABNORMAL
AST SERPL-CCNC: 47 U/L (ref 15–37)
BACTERIA URNS QL MICRO: ABNORMAL /HPF
BILIRUB SERPL-MCNC: 1.2 MG/DL (ref 0.2–1)
BILIRUB UR QL CFM: NEGATIVE
BUN SERPL-MCNC: 15 MG/DL (ref 6–20)
BUN/CREAT SERPL: 17 (ref 12–20)
CALCIUM SERPL-MCNC: 10.5 MG/DL (ref 8.5–10.1)
CHLORIDE SERPL-SCNC: 104 MMOL/L (ref 97–108)
CO2 SERPL-SCNC: 28 MMOL/L (ref 21–32)
COLOR UR: ABNORMAL
CREAT SERPL-MCNC: 0.87 MG/DL (ref 0.55–1.02)
EPITH CASTS URNS QL MICRO: ABNORMAL /LPF
ERYTHROCYTE [DISTWIDTH] IN BLOOD BY AUTOMATED COUNT: 12.3 % (ref 11.5–14.5)
GLOBULIN SER CALC-MCNC: 4.7 G/DL (ref 2–4)
GLUCOSE SERPL-MCNC: 145 MG/DL (ref 65–100)
GLUCOSE UR STRIP.AUTO-MCNC: NEGATIVE MG/DL
HCT VFR BLD AUTO: 48.9 % (ref 35–47)
HGB BLD-MCNC: 16.4 G/DL (ref 11.5–16)
HGB UR QL STRIP: ABNORMAL
KETONES UR QL STRIP.AUTO: 15 MG/DL
LEUKOCYTE ESTERASE UR QL STRIP.AUTO: ABNORMAL
LIPASE SERPL-CCNC: 84 U/L (ref 73–393)
MCH RBC QN AUTO: 34 PG (ref 26–34)
MCHC RBC AUTO-ENTMCNC: 33.5 G/DL (ref 30–36.5)
MCV RBC AUTO: 101.2 FL (ref 80–99)
MUCOUS THREADS URNS QL MICRO: ABNORMAL /LPF
NITRITE UR QL STRIP.AUTO: NEGATIVE
NRBC # BLD: 0 K/UL (ref 0–0.01)
NRBC BLD-RTO: 0 PER 100 WBC
PH UR STRIP: 6 [PH] (ref 5–8)
PLATELET # BLD AUTO: 298 K/UL (ref 150–400)
PMV BLD AUTO: 11.3 FL (ref 8.9–12.9)
POTASSIUM SERPL-SCNC: 4.5 MMOL/L (ref 3.5–5.1)
PROT SERPL-MCNC: 8.5 G/DL (ref 6.4–8.2)
PROT UR STRIP-MCNC: 30 MG/DL
RBC # BLD AUTO: 4.83 M/UL (ref 3.8–5.2)
RBC #/AREA URNS HPF: ABNORMAL /HPF (ref 0–5)
SODIUM SERPL-SCNC: 139 MMOL/L (ref 136–145)
SP GR UR REFRACTOMETRY: 1.02 (ref 1–1.03)
UROBILINOGEN UR QL STRIP.AUTO: 1 EU/DL (ref 0.2–1)
WBC # BLD AUTO: 17.6 K/UL (ref 3.6–11)
WBC URNS QL MICRO: ABNORMAL /HPF (ref 0–4)

## 2021-09-10 PROCEDURE — 99285 EMERGENCY DEPT VISIT HI MDM: CPT

## 2021-09-10 PROCEDURE — 81001 URINALYSIS AUTO W/SCOPE: CPT

## 2021-09-10 PROCEDURE — 74011000636 HC RX REV CODE- 636: Performed by: EMERGENCY MEDICINE

## 2021-09-10 PROCEDURE — 74177 CT ABD & PELVIS W/CONTRAST: CPT

## 2021-09-10 PROCEDURE — 80053 COMPREHEN METABOLIC PANEL: CPT

## 2021-09-10 PROCEDURE — 96375 TX/PRO/DX INJ NEW DRUG ADDON: CPT

## 2021-09-10 PROCEDURE — 85027 COMPLETE CBC AUTOMATED: CPT

## 2021-09-10 PROCEDURE — 93005 ELECTROCARDIOGRAM TRACING: CPT

## 2021-09-10 PROCEDURE — 96374 THER/PROPH/DIAG INJ IV PUSH: CPT

## 2021-09-10 PROCEDURE — 83690 ASSAY OF LIPASE: CPT

## 2021-09-10 RX ORDER — FENTANYL CITRATE 50 UG/ML
50 INJECTION, SOLUTION INTRAMUSCULAR; INTRAVENOUS
Status: COMPLETED | OUTPATIENT
Start: 2021-09-10 | End: 2021-09-11

## 2021-09-10 RX ORDER — ONDANSETRON 2 MG/ML
4 INJECTION INTRAMUSCULAR; INTRAVENOUS
Status: COMPLETED | OUTPATIENT
Start: 2021-09-10 | End: 2021-09-11

## 2021-09-10 RX ADMIN — IOPAMIDOL 100 ML: 755 INJECTION, SOLUTION INTRAVENOUS at 23:19

## 2021-09-11 ENCOUNTER — ANESTHESIA EVENT (OUTPATIENT)
Dept: SURGERY | Age: 51
DRG: 354 | End: 2021-09-11
Payer: COMMERCIAL

## 2021-09-11 ENCOUNTER — APPOINTMENT (OUTPATIENT)
Dept: GENERAL RADIOLOGY | Age: 51
DRG: 354 | End: 2021-09-11
Attending: SURGERY
Payer: COMMERCIAL

## 2021-09-11 ENCOUNTER — ANESTHESIA (OUTPATIENT)
Dept: SURGERY | Age: 51
DRG: 354 | End: 2021-09-11
Payer: COMMERCIAL

## 2021-09-11 PROBLEM — K42.0 UMBILICAL HERNIA WITH OBSTRUCTION, WITHOUT GANGRENE: Status: ACTIVE | Noted: 2021-09-11

## 2021-09-11 PROBLEM — K56.609 SMALL BOWEL OBSTRUCTION (HCC): Status: ACTIVE | Noted: 2021-09-11

## 2021-09-11 PROBLEM — K42.0 UMBILICAL HERNIA WITH OBSTRUCTION: Status: ACTIVE | Noted: 2021-09-11

## 2021-09-11 LAB
ATRIAL RATE: 112 BPM
ATRIAL RATE: 91 BPM
BASOPHILS # BLD: 0 K/UL (ref 0–0.1)
BASOPHILS NFR BLD: 0 % (ref 0–1)
CALCULATED P AXIS, ECG09: 19 DEGREES
CALCULATED P AXIS, ECG09: 27 DEGREES
CALCULATED R AXIS, ECG10: 13 DEGREES
CALCULATED T AXIS, ECG11: -17 DEGREES
CALCULATED T AXIS, ECG11: 36 DEGREES
DIAGNOSIS, 93000: NORMAL
DIAGNOSIS, 93000: NORMAL
DIFFERENTIAL METHOD BLD: ABNORMAL
EOSINOPHIL # BLD: 0.1 K/UL (ref 0–0.4)
EOSINOPHIL NFR BLD: 0 % (ref 0–7)
ERYTHROCYTE [DISTWIDTH] IN BLOOD BY AUTOMATED COUNT: 12.5 % (ref 11.5–14.5)
HCT VFR BLD AUTO: 46 % (ref 35–47)
HGB BLD-MCNC: 15.1 G/DL (ref 11.5–16)
IMM GRANULOCYTES # BLD AUTO: 0.1 K/UL (ref 0–0.04)
IMM GRANULOCYTES NFR BLD AUTO: 0 % (ref 0–0.5)
LYMPHOCYTES # BLD: 1.8 K/UL (ref 0.8–3.5)
LYMPHOCYTES NFR BLD: 12 % (ref 12–49)
MCH RBC QN AUTO: 33.8 PG (ref 26–34)
MCHC RBC AUTO-ENTMCNC: 32.8 G/DL (ref 30–36.5)
MCV RBC AUTO: 102.9 FL (ref 80–99)
MONOCYTES # BLD: 1.3 K/UL (ref 0–1)
MONOCYTES NFR BLD: 9 % (ref 5–13)
NEUTS SEG # BLD: 11.7 K/UL (ref 1.8–8)
NEUTS SEG NFR BLD: 79 % (ref 32–75)
NRBC # BLD: 0 K/UL (ref 0–0.01)
NRBC BLD-RTO: 0 PER 100 WBC
P-R INTERVAL, ECG05: 154 MS
P-R INTERVAL, ECG05: 170 MS
PLATELET # BLD AUTO: 259 K/UL (ref 150–400)
PMV BLD AUTO: 11.2 FL (ref 8.9–12.9)
Q-T INTERVAL, ECG07: 320 MS
Q-T INTERVAL, ECG07: 324 MS
QRS DURATION, ECG06: 78 MS
QRS DURATION, ECG06: 82 MS
QTC CALCULATION (BEZET), ECG08: 398 MS
QTC CALCULATION (BEZET), ECG08: 436 MS
RBC # BLD AUTO: 4.47 M/UL (ref 3.8–5.2)
VENTRICULAR RATE, ECG03: 112 BPM
VENTRICULAR RATE, ECG03: 91 BPM
WBC # BLD AUTO: 14.9 K/UL (ref 3.6–11)

## 2021-09-11 PROCEDURE — 77030021678 HC GLIDESCP STAT DISP VERT -B: Performed by: STUDENT IN AN ORGANIZED HEALTH CARE EDUCATION/TRAINING PROGRAM

## 2021-09-11 PROCEDURE — 77030002933 HC SUT MCRYL J&J -A: Performed by: SURGERY

## 2021-09-11 PROCEDURE — 77030002924 HC SUT GORTX WLGO -B: Performed by: SURGERY

## 2021-09-11 PROCEDURE — 77030026438 HC STYL ET INTUB CARD -A: Performed by: STUDENT IN AN ORGANIZED HEALTH CARE EDUCATION/TRAINING PROGRAM

## 2021-09-11 PROCEDURE — 74011250636 HC RX REV CODE- 250/636: Performed by: NURSE ANESTHETIST, CERTIFIED REGISTERED

## 2021-09-11 PROCEDURE — 74011250636 HC RX REV CODE- 250/636: Performed by: SURGERY

## 2021-09-11 PROCEDURE — 99222 1ST HOSP IP/OBS MODERATE 55: CPT | Performed by: SURGERY

## 2021-09-11 PROCEDURE — 74011000258 HC RX REV CODE- 258: Performed by: NURSE ANESTHETIST, CERTIFIED REGISTERED

## 2021-09-11 PROCEDURE — 77030008684 HC TU ET CUF COVD -B: Performed by: STUDENT IN AN ORGANIZED HEALTH CARE EDUCATION/TRAINING PROGRAM

## 2021-09-11 PROCEDURE — 74011250637 HC RX REV CODE- 250/637: Performed by: SURGERY

## 2021-09-11 PROCEDURE — 76060000033 HC ANESTHESIA 1 TO 1.5 HR: Performed by: SURGERY

## 2021-09-11 PROCEDURE — 77030002966 HC SUT PDS J&J -A: Performed by: SURGERY

## 2021-09-11 PROCEDURE — 77030005513 HC CATH URETH FOL11 MDII -B: Performed by: SURGERY

## 2021-09-11 PROCEDURE — 77030019908 HC STETH ESOPH SIMS -A: Performed by: STUDENT IN AN ORGANIZED HEALTH CARE EDUCATION/TRAINING PROGRAM

## 2021-09-11 PROCEDURE — 77030013079 HC BLNKT BAIR HGGR 3M -A: Performed by: STUDENT IN AN ORGANIZED HEALTH CARE EDUCATION/TRAINING PROGRAM

## 2021-09-11 PROCEDURE — C1781 MESH (IMPLANTABLE): HCPCS | Performed by: SURGERY

## 2021-09-11 PROCEDURE — 77030025927 HC STPLR FIX SECURSTRP J&J -F: Performed by: SURGERY

## 2021-09-11 PROCEDURE — 2709999900 HC NON-CHARGEABLE SUPPLY: Performed by: SURGERY

## 2021-09-11 PROCEDURE — 94760 N-INVAS EAR/PLS OXIMETRY 1: CPT

## 2021-09-11 PROCEDURE — 49655 PR LAP, INCISIONAL HERNIA REPAIR,INCARCERATED: CPT | Performed by: SURGERY

## 2021-09-11 PROCEDURE — 74011250636 HC RX REV CODE- 250/636: Performed by: STUDENT IN AN ORGANIZED HEALTH CARE EDUCATION/TRAINING PROGRAM

## 2021-09-11 PROCEDURE — 36415 COLL VENOUS BLD VENIPUNCTURE: CPT

## 2021-09-11 PROCEDURE — 85025 COMPLETE CBC W/AUTO DIFF WBC: CPT

## 2021-09-11 PROCEDURE — 65270000029 HC RM PRIVATE

## 2021-09-11 PROCEDURE — 74018 RADEX ABDOMEN 1 VIEW: CPT

## 2021-09-11 PROCEDURE — 76010000149 HC OR TIME 1 TO 1.5 HR: Performed by: SURGERY

## 2021-09-11 PROCEDURE — 74011000250 HC RX REV CODE- 250: Performed by: SURGERY

## 2021-09-11 PROCEDURE — 77010033678 HC OXYGEN DAILY

## 2021-09-11 PROCEDURE — 77030008606 HC TRCR ENDOSC KII AMR -B: Performed by: SURGERY

## 2021-09-11 PROCEDURE — 77030040361 HC SLV COMPR DVT MDII -B: Performed by: SURGERY

## 2021-09-11 PROCEDURE — 0WUF4JZ SUPPLEMENT ABDOMINAL WALL WITH SYNTHETIC SUBSTITUTE, PERCUTANEOUS ENDOSCOPIC APPROACH: ICD-10-PCS | Performed by: SURGERY

## 2021-09-11 PROCEDURE — 74011250636 HC RX REV CODE- 250/636: Performed by: EMERGENCY MEDICINE

## 2021-09-11 PROCEDURE — 77030031139 HC SUT VCRL2 J&J -A: Performed by: SURGERY

## 2021-09-11 PROCEDURE — 76210000016 HC OR PH I REC 1 TO 1.5 HR: Performed by: SURGERY

## 2021-09-11 PROCEDURE — 77030020747 HC TU INSUF ENDOSC TELE -A: Performed by: SURGERY

## 2021-09-11 PROCEDURE — 93005 ELECTROCARDIOGRAM TRACING: CPT

## 2021-09-11 PROCEDURE — 74011000250 HC RX REV CODE- 250: Performed by: NURSE ANESTHETIST, CERTIFIED REGISTERED

## 2021-09-11 PROCEDURE — 77030018836 HC SOL IRR NACL ICUM -A: Performed by: SURGERY

## 2021-09-11 DEVICE — VENTRALIGHT ST MESH WITH ECHO 2 POSITIONING SYSTEM, 4" X 6" (10 X 15 CM), ELLIPSE
Type: IMPLANTABLE DEVICE | Site: UMBILICAL | Status: FUNCTIONAL
Brand: VENTRALIGHT

## 2021-09-11 RX ORDER — OXYCODONE HYDROCHLORIDE 5 MG/1
10 TABLET ORAL
Status: DISCONTINUED | OUTPATIENT
Start: 2021-09-11 | End: 2021-09-13 | Stop reason: HOSPADM

## 2021-09-11 RX ORDER — OXYCODONE HYDROCHLORIDE 5 MG/1
5 TABLET ORAL AS NEEDED
Status: DISCONTINUED | OUTPATIENT
Start: 2021-09-11 | End: 2021-09-11 | Stop reason: HOSPADM

## 2021-09-11 RX ORDER — LORAZEPAM 2 MG/ML
1 INJECTION INTRAMUSCULAR ONCE
Status: COMPLETED | OUTPATIENT
Start: 2021-09-11 | End: 2021-09-11

## 2021-09-11 RX ORDER — SODIUM CHLORIDE, SODIUM LACTATE, POTASSIUM CHLORIDE, CALCIUM CHLORIDE 600; 310; 30; 20 MG/100ML; MG/100ML; MG/100ML; MG/100ML
25 INJECTION, SOLUTION INTRAVENOUS CONTINUOUS
Status: DISCONTINUED | OUTPATIENT
Start: 2021-09-11 | End: 2021-09-13 | Stop reason: HOSPADM

## 2021-09-11 RX ORDER — ROCURONIUM BROMIDE 10 MG/ML
INJECTION, SOLUTION INTRAVENOUS AS NEEDED
Status: DISCONTINUED | OUTPATIENT
Start: 2021-09-11 | End: 2021-09-11 | Stop reason: HOSPADM

## 2021-09-11 RX ORDER — SCOLOPAMINE TRANSDERMAL SYSTEM 1 MG/1
1 PATCH, EXTENDED RELEASE TRANSDERMAL
Status: DISCONTINUED | OUTPATIENT
Start: 2021-09-11 | End: 2021-09-13 | Stop reason: HOSPADM

## 2021-09-11 RX ORDER — FENTANYL CITRATE 50 UG/ML
25 INJECTION, SOLUTION INTRAMUSCULAR; INTRAVENOUS
Status: COMPLETED | OUTPATIENT
Start: 2021-09-11 | End: 2021-09-11

## 2021-09-11 RX ORDER — ONDANSETRON 2 MG/ML
4 INJECTION INTRAMUSCULAR; INTRAVENOUS
Status: DISCONTINUED | OUTPATIENT
Start: 2021-09-11 | End: 2021-09-13 | Stop reason: HOSPADM

## 2021-09-11 RX ORDER — DEXAMETHASONE SODIUM PHOSPHATE 4 MG/ML
INJECTION, SOLUTION INTRA-ARTICULAR; INTRALESIONAL; INTRAMUSCULAR; INTRAVENOUS; SOFT TISSUE AS NEEDED
Status: DISCONTINUED | OUTPATIENT
Start: 2021-09-11 | End: 2021-09-11 | Stop reason: HOSPADM

## 2021-09-11 RX ORDER — DROPERIDOL 2.5 MG/ML
0.62 INJECTION, SOLUTION INTRAMUSCULAR; INTRAVENOUS ONCE
Status: DISCONTINUED | OUTPATIENT
Start: 2021-09-11 | End: 2021-09-11 | Stop reason: HOSPADM

## 2021-09-11 RX ORDER — FENTANYL CITRATE 50 UG/ML
INJECTION, SOLUTION INTRAMUSCULAR; INTRAVENOUS AS NEEDED
Status: DISCONTINUED | OUTPATIENT
Start: 2021-09-11 | End: 2021-09-11 | Stop reason: HOSPADM

## 2021-09-11 RX ORDER — HYDROMORPHONE HYDROCHLORIDE 1 MG/ML
0.5 INJECTION, SOLUTION INTRAMUSCULAR; INTRAVENOUS; SUBCUTANEOUS
Status: DISCONTINUED | OUTPATIENT
Start: 2021-09-11 | End: 2021-09-13 | Stop reason: HOSPADM

## 2021-09-11 RX ORDER — SODIUM CHLORIDE 9 MG/ML
125 INJECTION, SOLUTION INTRAVENOUS CONTINUOUS
Status: DISCONTINUED | OUTPATIENT
Start: 2021-09-11 | End: 2021-09-11

## 2021-09-11 RX ORDER — SUCCINYLCHOLINE CHLORIDE 20 MG/ML
INJECTION INTRAMUSCULAR; INTRAVENOUS AS NEEDED
Status: DISCONTINUED | OUTPATIENT
Start: 2021-09-11 | End: 2021-09-11 | Stop reason: HOSPADM

## 2021-09-11 RX ORDER — PROPOFOL 10 MG/ML
INJECTION, EMULSION INTRAVENOUS AS NEEDED
Status: DISCONTINUED | OUTPATIENT
Start: 2021-09-11 | End: 2021-09-11 | Stop reason: HOSPADM

## 2021-09-11 RX ORDER — MIDAZOLAM HYDROCHLORIDE 1 MG/ML
INJECTION, SOLUTION INTRAMUSCULAR; INTRAVENOUS AS NEEDED
Status: DISCONTINUED | OUTPATIENT
Start: 2021-09-11 | End: 2021-09-11 | Stop reason: HOSPADM

## 2021-09-11 RX ORDER — GABAPENTIN 300 MG/1
300 CAPSULE ORAL 3 TIMES DAILY
Status: DISCONTINUED | OUTPATIENT
Start: 2021-09-11 | End: 2021-09-13 | Stop reason: HOSPADM

## 2021-09-11 RX ORDER — ONDANSETRON 2 MG/ML
INJECTION INTRAMUSCULAR; INTRAVENOUS AS NEEDED
Status: DISCONTINUED | OUTPATIENT
Start: 2021-09-11 | End: 2021-09-11 | Stop reason: HOSPADM

## 2021-09-11 RX ORDER — BUPIVACAINE HYDROCHLORIDE AND EPINEPHRINE 5; 5 MG/ML; UG/ML
INJECTION, SOLUTION EPIDURAL; INTRACAUDAL; PERINEURAL AS NEEDED
Status: DISCONTINUED | OUTPATIENT
Start: 2021-09-11 | End: 2021-09-11 | Stop reason: HOSPADM

## 2021-09-11 RX ORDER — NEOSTIGMINE METHYLSULFATE 1 MG/ML
INJECTION, SOLUTION INTRAVENOUS AS NEEDED
Status: DISCONTINUED | OUTPATIENT
Start: 2021-09-11 | End: 2021-09-11 | Stop reason: HOSPADM

## 2021-09-11 RX ORDER — KETOROLAC TROMETHAMINE 30 MG/ML
30 INJECTION, SOLUTION INTRAMUSCULAR; INTRAVENOUS EVERY 6 HOURS
Status: DISCONTINUED | OUTPATIENT
Start: 2021-09-11 | End: 2021-09-13 | Stop reason: HOSPADM

## 2021-09-11 RX ORDER — LIDOCAINE HYDROCHLORIDE 20 MG/ML
INJECTION, SOLUTION EPIDURAL; INFILTRATION; INTRACAUDAL; PERINEURAL AS NEEDED
Status: DISCONTINUED | OUTPATIENT
Start: 2021-09-11 | End: 2021-09-11 | Stop reason: HOSPADM

## 2021-09-11 RX ORDER — HYDROMORPHONE HYDROCHLORIDE 1 MG/ML
1 INJECTION, SOLUTION INTRAMUSCULAR; INTRAVENOUS; SUBCUTANEOUS ONCE
Status: COMPLETED | OUTPATIENT
Start: 2021-09-11 | End: 2021-09-11

## 2021-09-11 RX ORDER — GLYCOPYRROLATE 0.2 MG/ML
INJECTION INTRAMUSCULAR; INTRAVENOUS AS NEEDED
Status: DISCONTINUED | OUTPATIENT
Start: 2021-09-11 | End: 2021-09-11 | Stop reason: HOSPADM

## 2021-09-11 RX ADMIN — FENTANYL CITRATE 50 MCG: 50 INJECTION INTRAMUSCULAR; INTRAVENOUS at 09:33

## 2021-09-11 RX ADMIN — FENTANYL CITRATE 25 MCG: 0.05 INJECTION, SOLUTION INTRAMUSCULAR; INTRAVENOUS at 10:44

## 2021-09-11 RX ADMIN — HYDROMORPHONE HYDROCHLORIDE 1 MG: 1 INJECTION, SOLUTION INTRAMUSCULAR; INTRAVENOUS; SUBCUTANEOUS at 01:42

## 2021-09-11 RX ADMIN — SODIUM CHLORIDE, POTASSIUM CHLORIDE, SODIUM LACTATE AND CALCIUM CHLORIDE 25 ML/HR: 600; 310; 30; 20 INJECTION, SOLUTION INTRAVENOUS at 14:31

## 2021-09-11 RX ADMIN — OXYCODONE 10 MG: 5 TABLET ORAL at 14:07

## 2021-09-11 RX ADMIN — PROPOFOL 30 MG: 10 INJECTION, EMULSION INTRAVENOUS at 09:42

## 2021-09-11 RX ADMIN — DEXMEDETOMIDINE HYDROCHLORIDE 4 MCG: 100 INJECTION, SOLUTION, CONCENTRATE INTRAVENOUS at 08:41

## 2021-09-11 RX ADMIN — KETOROLAC TROMETHAMINE 30 MG: 30 INJECTION, SOLUTION INTRAMUSCULAR; INTRAVENOUS at 14:31

## 2021-09-11 RX ADMIN — GABAPENTIN 300 MG: 300 CAPSULE ORAL at 14:31

## 2021-09-11 RX ADMIN — MIDAZOLAM HYDROCHLORIDE 1 MG: 1 INJECTION, SOLUTION INTRAMUSCULAR; INTRAVENOUS at 08:19

## 2021-09-11 RX ADMIN — FENTANYL CITRATE 50 MCG: 50 INJECTION INTRAMUSCULAR; INTRAVENOUS at 09:07

## 2021-09-11 RX ADMIN — ONDANSETRON 4 MG: 2 INJECTION INTRAMUSCULAR; INTRAVENOUS at 00:01

## 2021-09-11 RX ADMIN — FENTANYL CITRATE 100 MCG: 50 INJECTION INTRAMUSCULAR; INTRAVENOUS at 08:27

## 2021-09-11 RX ADMIN — ONDANSETRON HYDROCHLORIDE 4 MG: 2 INJECTION, SOLUTION INTRAMUSCULAR; INTRAVENOUS at 09:30

## 2021-09-11 RX ADMIN — FENTANYL CITRATE 25 MCG: 0.05 INJECTION, SOLUTION INTRAMUSCULAR; INTRAVENOUS at 10:17

## 2021-09-11 RX ADMIN — SODIUM CHLORIDE, POTASSIUM CHLORIDE, SODIUM LACTATE AND CALCIUM CHLORIDE 25 ML/HR: 600; 310; 30; 20 INJECTION, SOLUTION INTRAVENOUS at 08:08

## 2021-09-11 RX ADMIN — PROPOFOL 200 MG: 10 INJECTION, EMULSION INTRAVENOUS at 08:27

## 2021-09-11 RX ADMIN — FENTANYL CITRATE 25 MCG: 0.05 INJECTION, SOLUTION INTRAMUSCULAR; INTRAVENOUS at 10:10

## 2021-09-11 RX ADMIN — LIDOCAINE HYDROCHLORIDE 100 MG: 20 INJECTION, SOLUTION INTRAVENOUS at 09:42

## 2021-09-11 RX ADMIN — GLYCOPYRROLATE 0.6 MG: 0.2 INJECTION, SOLUTION INTRAMUSCULAR; INTRAVENOUS at 09:30

## 2021-09-11 RX ADMIN — CEFAZOLIN 3 G: 10 INJECTION, POWDER, FOR SOLUTION INTRAVENOUS; PARENTERAL at 08:33

## 2021-09-11 RX ADMIN — BENZOCAINE 1 SPRAY: 200 SPRAY DENTAL; ORAL; PERIODONTAL at 01:44

## 2021-09-11 RX ADMIN — DEXMEDETOMIDINE HYDROCHLORIDE 2 MCG: 100 INJECTION, SOLUTION, CONCENTRATE INTRAVENOUS at 08:43

## 2021-09-11 RX ADMIN — FENTANYL CITRATE 25 MCG: 0.05 INJECTION, SOLUTION INTRAMUSCULAR; INTRAVENOUS at 10:29

## 2021-09-11 RX ADMIN — FENTANYL CITRATE 50 MCG: 0.05 INJECTION, SOLUTION INTRAMUSCULAR; INTRAVENOUS at 00:05

## 2021-09-11 RX ADMIN — DEXMEDETOMIDINE HYDROCHLORIDE 4 MCG: 100 INJECTION, SOLUTION, CONCENTRATE INTRAVENOUS at 08:39

## 2021-09-11 RX ADMIN — NEOSTIGMINE METHYLSULFATE 4 MG: 1 INJECTION, SOLUTION INTRAVENOUS at 09:30

## 2021-09-11 RX ADMIN — Medication 3 AMPULE: at 08:06

## 2021-09-11 RX ADMIN — FENTANYL CITRATE 50 MCG: 50 INJECTION INTRAMUSCULAR; INTRAVENOUS at 09:50

## 2021-09-11 RX ADMIN — ROCURONIUM BROMIDE 25 MG: 10 INJECTION INTRAVENOUS at 08:37

## 2021-09-11 RX ADMIN — KETOROLAC TROMETHAMINE 30 MG: 30 INJECTION, SOLUTION INTRAMUSCULAR; INTRAVENOUS at 17:16

## 2021-09-11 RX ADMIN — ROCURONIUM BROMIDE 5 MG: 10 INJECTION INTRAVENOUS at 08:27

## 2021-09-11 RX ADMIN — LORAZEPAM 1 MG: 2 INJECTION INTRAMUSCULAR; INTRAVENOUS at 01:40

## 2021-09-11 RX ADMIN — HYDROMORPHONE HYDROCHLORIDE 0.5 MG: 1 INJECTION, SOLUTION INTRAMUSCULAR; INTRAVENOUS; SUBCUTANEOUS at 11:27

## 2021-09-11 RX ADMIN — SODIUM CHLORIDE 1000 ML: 9 INJECTION, SOLUTION INTRAVENOUS at 00:00

## 2021-09-11 RX ADMIN — KETOROLAC TROMETHAMINE 30 MG: 30 INJECTION, SOLUTION INTRAMUSCULAR; INTRAVENOUS at 23:14

## 2021-09-11 RX ADMIN — GABAPENTIN 300 MG: 300 CAPSULE ORAL at 21:27

## 2021-09-11 RX ADMIN — LIDOCAINE HYDROCHLORIDE 80 MG: 20 INJECTION, SOLUTION INTRAVENOUS at 08:27

## 2021-09-11 RX ADMIN — SUCCINYLCHOLINE CHLORIDE 160 MG: 20 INJECTION, SOLUTION INTRAMUSCULAR; INTRAVENOUS at 08:27

## 2021-09-11 RX ADMIN — SODIUM CHLORIDE 125 ML/HR: 9 INJECTION, SOLUTION INTRAVENOUS at 03:17

## 2021-09-11 RX ADMIN — DEXAMETHASONE SODIUM PHOSPHATE 8 MG: 4 INJECTION, SOLUTION INTRAMUSCULAR; INTRAVENOUS at 08:42

## 2021-09-11 RX ADMIN — Medication 1 AMPULE: at 21:27

## 2021-09-11 RX ADMIN — Medication 1 AMPULE: at 11:00

## 2021-09-11 NOTE — PERIOP NOTES
6393 - TRANSFER - IN REPORT:    Verbal report received from [facility] RN(name) on Verena Appiah  being received from [facility]133(unit) for ordered procedure      Report consisted of patients Situation, Background, Assessment and   Recommendations(SBAR). Information from the following report(s) Pre Procedure Checklist and Procedure Verification was reviewed with the receiving nurse. Opportunity for questions and clarification was provided. Assessment completed upon patients arrival to unit and care assumed.

## 2021-09-11 NOTE — ED PROVIDER NOTES
EMERGENCY DEPARTMENT HISTORY AND PHYSICAL EXAM     ----------------------------------------------------------------------------  Please note that this dictation was completed with [employer], the computer voice recognition software. Quite often unanticipated grammatical, syntax, homophones, and other interpretive errors are inadvertently transcribed by the computer software. Please disregard these errors. Please excuse any errors that have escaped final proofreading  ----------------------------------------------------------------------------      Date: 9/10/2021  Patient Name: Krystle Jonas    History of Presenting Illness     Chief Complaint   Patient presents with    Abdominal Pain     Patient arrives with complaints of sharp abdominal pains since last night. She has been feeling dizzy and has vomited today.  Dizziness    Vomiting     History Provided By:  Patient    HPI: Krystle Jonas is a 54 y.o. female, with significant pmhx of endometritis resulting in sepsis and hysterectomy, who presents via private vehicle to the ED with c/o diffuse periumbilical abdominal pain that is been ongoing since yesterday with associated nausea, vomiting, dizziness especially when standing. Patient reports having similar symptoms in the past when she ended up having sepsis that they attributed to endometritis resulting in complete hysterectomy. Patient denies associated fever but has global tension type headache. patient notes that she thought her symptoms related to gas and took some Tums last night without much relief. Notes that her symptoms continued to worsen throughout today prompting her to come to emergency department for further evaluation. Patient also specifically denies any associated CP, SOB,  urinary sxs. Social Hx: denies tobacco  denies EtOH , denies recreational/Illicit Drugs    There are no other complaints, changes, or physical findings at this time.      PCP: Dedra Lorenzo NP    Allergies Allergen Reactions    Other Food Swelling     Allergy to jicama root       Current Facility-Administered Medications   Medication Dose Route Frequency Provider Last Rate Last Admin    ondansetron (ZOFRAN) injection 4 mg  4 mg IntraVENous NOW Corinna Velasco MD        sodium chloride 0.9 % bolus infusion 1,000 mL  1,000 mL IntraVENous NOW Corinna Velasco MD        fentaNYL citrate (PF) injection 50 mcg  50 mcg IntraVENous NOW Corinna Velasco MD         Current Outpatient Medications   Medication Sig Dispense Refill    diclofenac EC (VOLTAREN) 75 mg EC tablet Take 1 Tablet by mouth two (2) times a day. As needed for hip 60 Tablet 1    ergocalciferol (ERGOCALCIFEROL) 50,000 unit capsule Take 1 Cap by mouth every seven (7) days. 12 Cap 2       Past History     Past Medical History:  Past Medical History:   Diagnosis Date    Spinal stenosis 2016       Past Surgical History:  Past Surgical History:   Procedure Laterality Date    HX LAPAROSCOPIC SUPRACERVICAL HYSTERECTOMY  2013       Family History:  Family History   Problem Relation Age of Onset    Breast Cancer Maternal Aunt         age unknown       Social History:  Social History     Tobacco Use    Smoking status: Never Smoker    Smokeless tobacco: Never Used   Substance Use Topics    Alcohol use: Yes     Comment: occasionally    Drug use: No       Allergies: Allergies   Allergen Reactions    Other Food Swelling     Allergy to jicama root         Review of Systems   Review of Systems   Constitutional: Negative. Negative for fever. Eyes: Negative. Respiratory: Negative. Negative for shortness of breath. Cardiovascular: Negative for chest pain. Gastrointestinal: Positive for abdominal pain, nausea and vomiting. Endocrine: Negative. Genitourinary: Negative. Negative for difficulty urinating, dysuria and hematuria. Musculoskeletal: Negative. Skin: Negative. Neurological: Negative.     Psychiatric/Behavioral: Negative for suicidal ideas. All other systems reviewed and are negative. Physical Exam   Physical Exam  Vitals and nursing note reviewed. Constitutional:       General: She is not in acute distress. Appearance: She is well-developed. She is obese. She is not diaphoretic. HENT:      Head: Normocephalic and atraumatic. Nose: Nose normal.   Eyes:      General: No scleral icterus. Conjunctiva/sclera: Conjunctivae normal.   Neck:      Trachea: No tracheal deviation. Cardiovascular:      Rate and Rhythm: Normal rate and regular rhythm. Heart sounds: Normal heart sounds. No murmur heard. No friction rub. Pulmonary:      Effort: Pulmonary effort is normal. No respiratory distress. Breath sounds: Normal breath sounds. No stridor. No wheezing or rales. Abdominal:      General: Bowel sounds are normal. There is no distension. Palpations: Abdomen is soft. Tenderness: There is no abdominal tenderness. There is no rebound. Musculoskeletal:         General: No tenderness. Normal range of motion. Cervical back: Normal range of motion. Skin:     General: Skin is warm and dry. Findings: No rash. Neurological:      Mental Status: She is alert and oriented to person, place, and time. Cranial Nerves: No cranial nerve deficit. Psychiatric:         Speech: Speech normal.         Behavior: Behavior normal.         Thought Content:  Thought content normal.         Judgment: Judgment normal.           Diagnostic Study Results     Labs -     Recent Results (from the past 12 hour(s))   EKG, 12 LEAD, INITIAL    Collection Time: 09/10/21  7:54 PM   Result Value Ref Range    Ventricular Rate 112 BPM    Atrial Rate 112 BPM    P-R Interval 154 ms    QRS Duration 82 ms    Q-T Interval 320 ms    QTC Calculation (Bezet) 436 ms    Calculated P Axis 19 degrees    Calculated R Axis 13 degrees    Calculated T Axis 36 degrees    Diagnosis       Sinus tachycardia  Nonspecific T wave abnormality  No previous ECGs available     CBC W/O DIFF    Collection Time: 09/10/21  7:59 PM   Result Value Ref Range    WBC 17.6 (H) 3.6 - 11.0 K/uL    RBC 4.83 3.80 - 5.20 M/uL    HGB 16.4 (H) 11.5 - 16.0 g/dL    HCT 48.9 (H) 35.0 - 47.0 %    .2 (H) 80.0 - 99.0 FL    MCH 34.0 26.0 - 34.0 PG    MCHC 33.5 30.0 - 36.5 g/dL    RDW 12.3 11.5 - 14.5 %    PLATELET 179 644 - 252 K/uL    MPV 11.3 8.9 - 12.9 FL    NRBC 0.0 0  WBC    ABSOLUTE NRBC 0.00 0.00 - 1.68 K/uL   METABOLIC PANEL, COMPREHENSIVE    Collection Time: 09/10/21  7:59 PM   Result Value Ref Range    Sodium 139 136 - 145 mmol/L    Potassium 4.5 3.5 - 5.1 mmol/L    Chloride 104 97 - 108 mmol/L    CO2 28 21 - 32 mmol/L    Anion gap 7 5 - 15 mmol/L    Glucose 145 (H) 65 - 100 mg/dL    BUN 15 6 - 20 MG/DL    Creatinine 0.87 0.55 - 1.02 MG/DL    BUN/Creatinine ratio 17 12 - 20      GFR est AA >60 >60 ml/min/1.73m2    GFR est non-AA >60 >60 ml/min/1.73m2    Calcium 10.5 (H) 8.5 - 10.1 MG/DL    Bilirubin, total 1.2 (H) 0.2 - 1.0 MG/DL    ALT (SGPT) 86 (H) 12 - 78 U/L    AST (SGOT) 47 (H) 15 - 37 U/L    Alk.  phosphatase 97 45 - 117 U/L    Protein, total 8.5 (H) 6.4 - 8.2 g/dL    Albumin 3.8 3.5 - 5.0 g/dL    Globulin 4.7 (H) 2.0 - 4.0 g/dL    A-G Ratio 0.8 (L) 1.1 - 2.2     LIPASE    Collection Time: 09/10/21  7:59 PM   Result Value Ref Range    Lipase 84 73 - 393 U/L   URINALYSIS W/ RFLX MICROSCOPIC    Collection Time: 09/10/21  8:15 PM   Result Value Ref Range    Color DARK YELLOW      Appearance CLOUDY (A) CLEAR      Specific gravity 1.025 1.003 - 1.030      pH (UA) 6.0 5.0 - 8.0      Protein 30 (A) NEG mg/dL    Glucose Negative NEG mg/dL    Ketone 15 (A) NEG mg/dL    Blood TRACE (A) NEG      Urobilinogen 1.0 0.2 - 1.0 EU/dL    Nitrites Negative NEG      Leukocyte Esterase TRACE (A) NEG      WBC 5-10 0 - 4 /hpf    RBC 0-5 0 - 5 /hpf    Epithelial cells MANY (A) FEW /lpf    Bacteria 1+ (A) NEG /hpf    Mucus 3+ (A) NEG /lpf   BILIRUBIN, CONFIRM    Collection Time: 09/10/21  8:15 PM   Result Value Ref Range    Bilirubin UA, confirm Negative NEG         Radiologic Studies -   CT ABD PELV W CONT   Final Result   Small bowel obstruction due to a periumbilical hernia. CT Results  (Last 48 hours)               09/10/21 2320  CT ABD PELV W CONT Final result    Impression:  Small bowel obstruction due to a periumbilical hernia. Narrative:  INDICATION: abdominal pain        EXAM: CT Abdomen and Pelvis is performed with 100 mL Isovue 370 contrast IV   without oral contrast. CT dose reduction was achieved through use of a   standardized protocol tailored for this examination and automatic exposure   control for dose modulation. FINDINGS:   There is small bowel obstruction due to a periumbilical hernia. There is no inflammation, ascites, pneumoperitoneum or significant adenopathy. Liver shows steatosis. Bile ducts are not enlarged. Pancreas shows no mass or   inflammation. Spleen is unremarkable. Adrenal glands are normal in size. Kidneys   show no mass or hydronephrosis. Aorta is without aneurysm. The appendix is normal. The bladder is unremarkable. The distal ureters are not   dilated. There is no apparent pelvic mass. There is a chronic T12 compression   fracture and advanced bilateral hip arthritis. CXR Results  (Last 48 hours)    None            Medical Decision Making   I am the first provider for this patient. I reviewed the vital signs, available nursing notes, past medical history, past surgical history, family history and social history. Vital Signs-Reviewed the patient's vital signs.   Patient Vitals for the past 12 hrs:   Temp Pulse Resp BP SpO2   09/10/21 2343 97.8 °F (36.6 °C) 95 18 117/89 98 %   09/10/21 1947 98 °F (36.7 °C) (!) 111 20 120/78 97 %       Pulse Oximetry Analysis - 98% on RA, normal  Rate: 95 bpm  Rhythm: nsr      Provider Notes (Medical Decision Making):     DDX:  Acute sinusitis, small bowel blockage, cholelithiasis, pancreatitis, UTI, kidney stones, orthostatics    Plan:  Labs, antiemetic, analgesic, CT scan, IV fluids, orthostatic testing    Impression:  Small bowel obstruction    ED Course:   Initial assessment performed. The patients presenting problems have been discussed, and they are in agreement with the care plan formulated and outlined with them. I have encouraged them to ask questions as they arise throughout their visit. I reviewed the nursing notes and and vital signs from today's visit, as well as the electronic medical record system for any past medical records that were available that may contribute to the patients current condition, including previous orthopedic visits for hip pain    Nursing notes will be reviewed as they become available in realtime while the pt has been in the ED. Monet Sow MD    I personally reviewed/interpreted pt's imaging. Agree with official read by radiology as noted above. Monet Sow MD    PROGRESS NOTE:  1:05 AM  Pt noted to have small bowel obstruction due to periumbilical hernia on CT. Attempted to reduce at bedside after patient received fentanyl. After lying flat for some time she notes mild improvement but still having pain. Notes that her previously reported headache and nausea are improved. Will consult with general surgery for further recommendations. Monet Sow MD      CONSULT NOTE:   1:05 AM  Monet Sow MD spoke with Dr. Haris Jacobson,   Specialty: Fabio Rosas due to Periumbilical anemia with evidence of obstruction. Discussed pt's HPI and available diagnostic results thus far, specifically noting scan results. Consultant recommends NG tube placement and admission. Monet Sow MD    ADMISSION NOTE:    Patient is being admitted to the hospital by Dr. Peter Rosas. The results of their tests and reasons for their admission have been discussed with them and/or available family.   They convey agreement and understanding for the need to be admitted and for their admission diagnosis. Minerva Villarreal MD               Critical Care Time:     none      Diagnosis     Clinical Impression:   1. Umbilical hernia with obstruction    2. Obesity, morbid ([address]        PLAN:  1.  Admit to general surgery

## 2021-09-11 NOTE — OP NOTES
Date of Procedure: 9/11/2021     Preoperative Diagnosis: INCARCERATED INCISIONAL HERNIA, SBO    Postoperative Diagnosis: INCARCERATED INCISIONAL HERNIA. SBO     Procedure: LAPAROSCOPIC REPAIR OF INCARCERATED INCISIONAL HERNIA       Surgeon: Jhoana Zepeda MD    Assistant(s):  Kina Unger     Anesthesia: General     COMPLICATIONS: None apparent. IMPLANTS:   Implant Name Type Inv. Item Serial No.  Lot No. LRB No. Used Action   MESH SURG [id number] ELLIPSE W/ ECHO 2 POS SYS VENTRALIGHT - SNA  MESH SURG [id number] ELLIPSE W/ ECHO 2 POS SYS VENTRALIGHT NA BARD DAVOL_WD [id number] N/A 1 Implanted       ESTIMATED BLOOD LOSS: Minimal.    TUBES AND DRAINS: None. DESCRIPTION OF PROCEDURE: The patient was identified in the preoperative  holding area and informed consent obtained. sHE was given preoperative  antibiotics. sHE was then taken to the operating room, placed in supine  position, and underwent general endotracheal anesthesia without  complication. His arms were then tucked, and all pressure points were  padded. His abdomen was then prepped and draped usual sterile fashion. Entry into the abdomen was obtained in the right upper quadrant at the  anterior axillary line. At this site, 3 mL of 0.5% Marcaine was injected in  the subcutaneous space. A 12-mm incision was made, and then a 12-mm optical  trocar containing a 10 mm 0-degree laparoscope was used to dissect and  layer the abdominal wall under direct laparoscopic vision. Entry into the  abdomen was confirmed visually. Once within the abdomen, insufflation was  provided through the trocar to a pressure of 15 mmHg. The patient tolerated  insufflation well, and there were no apparent complications. A 360-degree evaluation of the abdomen was then performed from this trocar  site. The patient was noted to have a loop of bowel herniating out through a approximate 2 cm defect. There were no peritoneal implants.   The surface anatomy of the liver appeared  normal. There were no lesions on the liver. There appeared to be no groin  hernias. Attention was then focused on the right lower quadrant, and a 5-mm trocar  was inserted under direct laparoscopic vision. Two additional 5-mm trocars  were placed on the left-hand side under direct laparoscopic vision. Attention was then focused on the area of the hernia. The bowel was  reduced into the abdomen with blunt traction and external compression. The bowel appeared viable. The umbilical fascial defect could clearly be identified with all of the preperitoneal fat stripped. The defect measured 2.5 x 2 cm in diameter. A small stab incision was made right in the center of the defect. An Endo Close device was used to pass an #1 PDS across the defect  under direct laparoscopic vision. This suture was then snapped and held in  place while a piece of 4 x 6-inch Ventralex light mesh with the echo  positioning system was rolled and inserted through the 12-mm trocar site. The stringon the echo system was then brought out through the same stab  incision at the umbilicus using the Endo Close device. Once this tubing was  brought out to the skin surface, the previously placed transfascial Vicryl  suture was then tied, thus closing the umbilical defect. The suture in the mesh was pull tightly which in turn, wallpapered the mesh to the abdominal wall. The mesh was inspected. It was found to be smooth and flat without any wrinkles or kinks. The mesh was aligned so that its long axis was along the midline of the patient's abdomen. Once this was  confirmed, a Sorbafix tacker was used to tack the mesh circumferentially. Tacks were placed 5 mm in from the edge and approximately 5 mm apart  completely circumferentially around the mesh. Once tacking was completed,  the Echo positioning device was removed through the 12-mm trocar site.   The mesh was inspected, and it was found to be well wallpapered to the  anterior abdominal wall with good overlap in all directions around the  umbilical defect. There were no signs of any bleeding. At this time, 4 transfascial sutures were placed. This was done at the  superior and inferior edge of the mesh and along the midline at the lateral  edge of the mesh. The transfascial sutures were 0 Patten-Asael, and they were  placed by first making a small stab incision with an 11-blade scalpel. An  mL of the 0.5% Marcaine solution was injected into the preperitoneal space,  and then an Endo Close device was used to pass the Patten-Asael suture through  the abdominal wall, through the mesh, and into the abdominal cavity. The  Endo Close device was then backed up into the subcutaneous tissue, then  flushed it 1 cm laterally and then advanced through the abdominal wall and  back to the mesh, grabbing the suture and bringing it out through the same  stab incision. The transfascial suture was then tied. All 4 transfascial  sutures were placed in this fashion. The mesh was once again inspected. There were no signs of any bleeding from the transfascial suture sites. The remainder of the abdomen was inspected. There were no signs of any  bleeding. Attention was then focused on the right upper quadrant 12-mm trocar site. The trocar was removed under direct laparoscopic vision, and the fascia at  this trocar site was closed with a transfascial suture of 0 Vicryl using  the Endo Close device under direct laparoscopic vision. Once this suture  was tied, there was no palpable defect, and no loss of pneumoperitoneum,  indicating adequate closure of this trocar site. Insufflation was then  vented to the 5-mm trocars and they were removed. The skin at all trocar sites was closed with 4-0 Monocryl and Dermabond. The skin at the transfascial suture sites was closed with Dermabond. The  patient was extubated in the room and taken to the postanesthesia care unit  in stable condition.  Instruments and sponge counts were correct x2.

## 2021-09-11 NOTE — ED NOTES
Patient's oxygen desaturated to 87% on room air after the Dilaudid and Ativan despite being pushed slowly. Patient temporarily placed on 2L of oxygen via nasal cannula and is up to 93%.

## 2021-09-11 NOTE — H&P
Surgery Consult    Subjective:      Dani Alexander is a 54 y.o. female who was admitted by phone overnight by my partner for an incarcerated umbilical hernia causing SBO. Pain started 2 days ago. Pain is diffuse and severe rated 10/10. Nothing makes it better or worse . Pain is associated with nausea and vomiting. She denies fevers and chills. Past surgical history is significant for GEN. 2      Patient Active Problem List    Diagnosis Date Noted    Umbilical hernia with obstruction, without gangrene 78/88/3950    Umbilical hernia with obstruction 09/11/2021    Bilateral primary osteoarthritis of hip 01/15/2020    Obesity, morbid ([address] 01/10/2020     Past Medical History:   Diagnosis Date    Spinal stenosis 2016      Past Surgical History:   Procedure Laterality Date    HX LAPAROSCOPIC SUPRACERVICAL HYSTERECTOMY  2013      Social History     Tobacco Use    Smoking status: Never Smoker    Smokeless tobacco: Never Used   Substance Use Topics    Alcohol use: Yes     Comment: occasionally      Family History   Problem Relation Age of Onset    Breast Cancer Maternal Aunt         age unknown      Current Facility-Administered Medications   Medication Dose Route Frequency    0.9% sodium chloride infusion  125 mL/hr IntraVENous CONTINUOUS    HYDROmorphone (DILAUDID) injection 0.5 mg  0.5 mg IntraVENous Q2H PRN    ondansetron (ZOFRAN) injection 4 mg  4 mg IntraVENous Q4H PRN    alcohol 62% (NOZIN) nasal  3 Ampule  3 Ampule Topical ONCE      Allergies   Allergen Reactions    Other Food Swelling     Allergy to jicama root       Review of Systems:    A comprehensive review of systems was negative except for that written in the History of Present Illness.     Objective:        Visit Vitals  /65   Pulse 92   Temp 97.7 °F (36.5 °C)   Resp 19   Ht 5' 5\" (1.651 m)   Wt 125.1 kg (275 lb 12.7 oz)   LMP 02/07/2011   SpO2 92%   BMI 45.89 kg/m²       Physical Exam:  GENERAL: alert, cooperative, no distress, appears stated age, morbidly obese, LUNG: clear to auscultation bilaterally, HEART: regular rate and rhythm, S1, S2 normal, no murmur, click, rub or gallop, ABDOMEN: distended, tender, well healed aaron-umbilical scar. Firm hernia that I can not reduce. It is very tender. Hypoactive bowel sounds. There is no erythema of the abdominal skin. Imaging:  images and reports reviewed    Lab/Data Review:  BMP:   Lab Results   Component Value Date/Time     09/10/2021 07:59 PM    K 4.5 09/10/2021 07:59 PM     09/10/2021 07:59 PM    CO2 28 09/10/2021 07:59 PM    AGAP 7 09/10/2021 07:59 PM     (H) 09/10/2021 07:59 PM    BUN 15 09/10/2021 07:59 PM    CREA 0.87 09/10/2021 07:59 PM    GFRAA >60 09/10/2021 07:59 PM    GFRNA >60 09/10/2021 07:59 PM     CBC:   Lab Results   Component Value Date/Time    WBC 14.9 (H) 09/11/2021 05:48 AM    HGB 15.1 09/11/2021 05:48 AM    HCT 46.0 09/11/2021 05:48 AM     09/11/2021 05:48 AM         Assessment:     54year old with incarcerated incisional hernia causing SBO. Plan:     1. I recommend proceeding with laparoscopic incisional hernia repair, possible bowel resection, possible open surgery. 2. Discussed aspects of surgical intervention, methods, risks (including by not limited to infection, bleeding, hematoma, and perforation of the intestines, anastomotic leak  or solid organs), and the risks of general anesthetic. The patient understands the risks; any and all questions were answered to the patient's satisfaction. 3. Patient does wish to proceed with surgery.

## 2021-09-11 NOTE — ED NOTES
Report given to [facility], [facility]. They were informed of patient chief complaint, current status, orders completed (to include IV access/medications/radiology testing), outstanding orders that still need to be completed, and the treatment plan. Ensured no questions or concerns regarding the patient prior to departure.

## 2021-09-11 NOTE — ED NOTES
NGT placed and set to moderate suction per the orders. Patient has put out 450mL of bile colored liquid. X-Ray called to complete KUB.

## 2021-09-11 NOTE — PERIOP NOTES
5437 - Handoff Report from Operating Room to PACU    Report received from [facility] and [facility] CRNA regarding Luda Santacruz. Surgeon(s):  Olivia Mendoza MD  And Procedure(s) (LRB):  LAPAROSCOPIC  UMBILICAL HERNIA REPAIR (N/A)  confirmed   with allergies, dressings and LOCAL discussed. Anesthesia type, drugs, patient history, complications, estimated blood loss, vital signs, intake and output, and last pain medication, lines, reversal medications and temperature were reviewed. 4576 - PT ARRIVED INTO PACU BAY 9.  PT SEDATED - EASILY AROUSABLE AND QUICKLY FALL BACK TO SLEEP. RESP EVEN AND UNLABORED POX ON 4L NC >94%. BSB AND CLEAR ON AUSC. ABD SOFT - BS - HYPOACTIVE. TROCAR SITES X4 WITH DERMABOND CD&I. STAB SITED X5 -WITH DERMABOND CD&I.    1010 - PT C/O 7/10 ABD PAIN - MEDICATED WITH FENTANYL AS ORDERED. PT A&O X4 - WATKINS SPON AND TO COMMAND. ORIENTED TO PACU SETTING.     1049 - TRANSFER - OUT REPORT:    Verbal report given to [facility] RN(name) on Luda Santacruz  being transferred to [facility](unit) for routine progression of care       Report consisted of patients Situation, Background, Assessment and   Recommendations(SBAR). Information from the following report(s) OR Summary and Procedure Summary was reviewed with the receiving nurse.     Lines:   Peripheral IV 09/10/21 Left Antecubital (Active)   Site Assessment Clean, dry, & intact 09/11/21 0952   Phlebitis Assessment 0 09/11/21 0952   Infiltration Assessment 0 09/11/21 0952   Dressing Status Clean, dry, & intact 09/11/21 0952   Dressing Type Transparent 09/11/21 0952   Hub Color/Line Status Pink;Flushed 09/11/21 6957   Action Taken Open ports on tubing capped 09/11/21 0329       Peripheral IV 09/11/21 Right Wrist (Active)   Site Assessment Clean, dry, & intact 09/11/21 0952   Phlebitis Assessment 0 09/11/21 0952   Infiltration Assessment 0 09/11/21 0952   Dressing Status Clean, dry, & intact 09/11/21 0952   Dressing Type Transparent 09/11/21 7[facility] Color/Line Status Infusing;Pink 09/11/21 2716        Opportunity for questions and clarification was provided.       Patient transported with:   O2 @ 2 liters  Registered Nurse

## 2021-09-11 NOTE — PROGRESS NOTES
Bedside shift change report given to Rebeca (oncoming nurse) by Akila  (offgoing nurse). Report included the following information SBAR, Kardex, Intake/Output, MAR and Recent Results.

## 2021-09-11 NOTE — PROGRESS NOTES
End of Shift Note    Bedside shift change report given to [address] (oncoming nurse) by Mariano Tamez RN (offgoing nurse). Report included the following information SBAR, Kardex and Recent Results    Shift worked:  7a-7p     Shift summary and any significant changes:     Patient had laparoscopic umbilical hernia repair this morning. She has had some pain but managed with medication. NG tube was removed. Patient has been instructed on how to use the incentive spirometer. SCD's are on bilateral legs. Concerns for physician to address:  none     Zone phone for oncoming shift:   [phone]       Activity:  Activity Level: Bed Rest  Number times ambulated in hallways past shift: 0  Number of times OOB to chair past shift: 0    Cardiac:   Cardiac Monitoring: No      Cardiac Rhythm: Sinus Rhythm    Access:   Current line(s): PIV     Genitourinary:   Urinary status: voiding    Respiratory:   O2 Device: Nasal cannula  Chronic home O2 use?: NO  Incentive spirometer at bedside: YES     GI:  Last Bowel Movement Date: 09/10/21  Current diet:  ADULT DIET Clear Liquid  Passing flatus: YES  Tolerating current diet: YES       Pain Management:   Patient states pain is manageable on current regimen: YES    Skin:  Mikey Score: 21  Interventions: increase time out of bed    Patient Safety:  Fall Score:  Total Score: 2  Interventions: pt to call before getting OOB       Length of Stay:  Expected LOS: - - -  Actual LOS: 0      Mariano Tamez RN

## 2021-09-11 NOTE — ANESTHESIA POSTPROCEDURE EVALUATION
Procedure(s):  LAPAROSCOPIC  UMBILICAL HERNIA REPAIR. general    Anesthesia Post Evaluation      Multimodal analgesia: multimodal analgesia used between 6 hours prior to anesthesia start to PACU discharge  Patient location during evaluation: PACU  Patient participation: complete - patient participated  Level of consciousness: awake and alert  Pain management: adequate  Airway patency: patent  Anesthetic complications: no  Cardiovascular status: acceptable, hemodynamically stable and blood pressure returned to baseline  Respiratory status: acceptable and room air  Hydration status: euvolemic  Post anesthesia nausea and vomiting:  none  Final Post Anesthesia Temperature Assessment:  Normothermia (36.0-37.5 degrees C)      INITIAL Post-op Vital signs:   Vitals Value Taken Time   /64 09/11/21 1055   Temp 36.6 °C (97.8 °F) 09/11/21 1035   Pulse 92 09/11/21 1057   Resp 15 09/11/21 1057   SpO2 94 % 09/11/21 1057   Vitals shown include unvalidated device data.

## 2021-09-12 LAB
ANION GAP SERPL CALC-SCNC: 4 MMOL/L (ref 5–15)
BUN SERPL-MCNC: 19 MG/DL (ref 6–20)
BUN/CREAT SERPL: 21 (ref 12–20)
CALCIUM SERPL-MCNC: 8.6 MG/DL (ref 8.5–10.1)
CHLORIDE SERPL-SCNC: 105 MMOL/L (ref 97–108)
CO2 SERPL-SCNC: 30 MMOL/L (ref 21–32)
CREAT SERPL-MCNC: 0.89 MG/DL (ref 0.55–1.02)
GLUCOSE SERPL-MCNC: 129 MG/DL (ref 65–100)
POTASSIUM SERPL-SCNC: 4.9 MMOL/L (ref 3.5–5.1)
SODIUM SERPL-SCNC: 139 MMOL/L (ref 136–145)

## 2021-09-12 PROCEDURE — 65270000029 HC RM PRIVATE

## 2021-09-12 PROCEDURE — 74011250637 HC RX REV CODE- 250/637: Performed by: SURGERY

## 2021-09-12 PROCEDURE — 74011250636 HC RX REV CODE- 250/636: Performed by: SURGERY

## 2021-09-12 PROCEDURE — 94760 N-INVAS EAR/PLS OXIMETRY 1: CPT

## 2021-09-12 PROCEDURE — 36415 COLL VENOUS BLD VENIPUNCTURE: CPT

## 2021-09-12 PROCEDURE — 80048 BASIC METABOLIC PNL TOTAL CA: CPT

## 2021-09-12 RX ADMIN — Medication 1 AMPULE: at 21:00

## 2021-09-12 RX ADMIN — OXYCODONE 10 MG: 5 TABLET ORAL at 23:09

## 2021-09-12 RX ADMIN — GABAPENTIN 300 MG: 300 CAPSULE ORAL at 18:12

## 2021-09-12 RX ADMIN — OXYCODONE 10 MG: 5 TABLET ORAL at 18:12

## 2021-09-12 RX ADMIN — KETOROLAC TROMETHAMINE 30 MG: 30 INJECTION, SOLUTION INTRAMUSCULAR; INTRAVENOUS at 05:16

## 2021-09-12 RX ADMIN — Medication 1 AMPULE: at 11:44

## 2021-09-12 RX ADMIN — GABAPENTIN 300 MG: 300 CAPSULE ORAL at 11:43

## 2021-09-12 RX ADMIN — GABAPENTIN 300 MG: 300 CAPSULE ORAL at 21:32

## 2021-09-12 NOTE — PROGRESS NOTES
Reason for Admission:  Umbilical Hernia w/obstruction                     RUR Score:  5% Low                   Plan for utilizing home health:   Prior HH in the past.  Not currently open w/any [place] agency at this present time. PCP: First and Last name:  Khai Jha NP     Name of Practice: [facility]   Are you a current patient: Yes/No: Yes   Approximate date of last visit: 2020   Can you participate in a virtual visit with your PCP: No                    Current Advanced Directive/Advance Care Plan: Full Code      Healthcare Decision Maker:     Daughter Mony De Luna, @ [phone] primary decision maker    Mother, Avani Bergeron @ [phone] secondary decision maker    Click here to [address] including selection of the Healthcare Decision Maker Relationship (ie \"Primary\")                             Transition of Care Plan:                      Lives in her own one level home w/three steps to enter the home. Patient's youngest son also lives w/her. Please Note: son whom resides w/patient is an adult over the age of 25. Last seen by PCP (NP Honey Gaucher) in 2020. Explained the importance of seeing PCP. Suggested she schedule an appointment, and patient acknowledged understanding. Independent w/all ADL's & IADL's and requires no assistance at this given time. Patient drives to/from work. Utilizes [facility] ([employer]) to p/u all medications. No home O2. DME (cane) only. Prior [place] in the past, and not currently open w/any [place] agency. No prior SNF or IRF (inpatient rehab) in the past.  Will continue to monitor re: discharge disposition. Care Management Interventions  PCP Verified by CM: Yes (2020)  Mode of Transport at Discharge: Other (see comment) (Family)  Transition of Care Consult (CM Consult): Discharge Planning  Discharge Durable Medical Equipment:  (No home O2.   DME (cane))  Support Systems: Child(amy)  Confirm Follow Up Transport: Family  Discharge Location  Discharge Placement: [address], [place]

## 2021-09-12 NOTE — PROGRESS NOTES
Pt refusing to get out of bed since surgery this rn educated pt about the importance of early ambulation after surgery she state's\" but I have been using the incentive spirometry to I should be fine. Bedside shift change report given to Jean-Pierre Reed (oncoming nurse) Stan Sheldon (offgoing nurse). Report included the following information SBAR, Kardex, Intake/Output, MAR and Recent Results.

## 2021-09-12 NOTE — PROGRESS NOTES
End of Shift Note    Bedside shift change report given to Julius (oncoming nurse) by Lillian Manzo RN (offgoing nurse). Report included the following information SBAR, Kardex, OR Summary, Intake/Output, MAR, Accordion, Recent Results and Med Rec Status    Shift worked: 7a-7p     Shift summary and any significant changes:     Pt up to chair, +flatus and BM. Pt lost IV, 3 RNs unsuccessfully attempted to place, pt taking po and has po pain med ordered. Notified Dr Cira Shah, [facility] to leave IV out. Concerns for physician to address:  none     Zone phone for oncoming shift:   [phone]       Activity:  Activity Level: Up with Assistance  Number times ambulated in hallways past shift: 0  Number of times OOB to chair past shift: 1    Cardiac:   Cardiac Monitoring: No      Cardiac Rhythm: Sinus Rhythm    Access:   Current line(s): no access     Genitourinary:   Urinary status: voiding    Respiratory:   O2 Device: None (Room air)  Chronic home O2 use?: NO  Incentive spirometer at bedside: YES  Actual Volume (ml): 1000 ml  GI:  Last Bowel Movement Date: 09/12/21  Current diet:  ADULT DIET Regular  Passing flatus: YES  Tolerating current diet: YES       Pain Management:   Patient states pain is manageable on current regimen: YES    Skin:  Mikey Score: 21  Interventions: increase time out of bed    Patient Safety:  Fall Score:  Total Score: 2  Interventions: gripper socks       Length of Stay:  Expected LOS: - - -  Actual LOS: 1      Lillian Manzo, LUDY

## 2021-09-12 NOTE — PROGRESS NOTES
SURGERY PROGRESS NOTE      Admit Date: 9/10/2021    POD 1 Day Post-Op    Procedure: Procedure(s):  LAPAROSCOPIC  UMBILICAL HERNIA REPAIR      Subjective:     Patient requiring IV pain medication. Denies nausea. Tolerating liquids. Passing flatus       Objective:     Visit Vitals  BP (!) 98/58 Comment: RN notified   Pulse (!) 112 Comment: Nurse Notified   Temp 98.2 °F (36.8 °C)   Resp 18   Ht 5' 5\" (1.651 m)   Wt 125.1 kg (275 lb 12.7 oz)   LMP 2011   SpO2 95%   BMI 45.89 kg/m²        Temp (24hrs), Av.5 °F (36.9 °C), Min:98.1 °F (36.7 °C), Max:99.6 °F (37.6 °C)      No intake/output data recorded. 09/10 1901 -  0700  In: 600 [I.V.:600]  Out: 385 [Urine:385]    Physical Exam:    General:  alert, cooperative, no distress, appears stated age   Abdomen: soft, bowel sounds active, appropriately tender   Incision:   healing well, no drainage, no erythema, no hernia, no seroma, no swelling, no dehiscence, incision well approximated           Lab Results   Component Value Date/Time    WBC 14.9 (H) 2021 05:48 AM    HGB 15.1 2021 05:48 AM    HCT 46.0 2021 05:48 AM    PLATELET 836 21/10/2775 05:48 AM    .9 (H) 2021 05:48 AM     Lab Results   Component Value Date/Time    GFR est non-AA >60 2021 02:20 AM    GFR est AA >60 2021 02:20 AM    Creatinine 0.89 2021 02:20 AM    BUN 19 2021 02:20 AM    Sodium 139 2021 02:20 AM    Potassium 4.9 2021 02:20 AM    Chloride 105 2021 02:20 AM    CO2 30 2021 02:20 AM       Assessment:     Principal Problem:    Umbilical hernia with obstruction, without gangrene (2021)    Active Problems:    Umbilical hernia with obstruction (2021)    recovering as expected.   Given need for IV pain medication will keep inpatient again tonight    Plan:       Regular diet  Wean IV pain medication as tolerated  D/C planning for tomorrow

## 2021-09-13 VITALS
HEIGHT: 65 IN | WEIGHT: 275.8 LBS | BODY MASS INDEX: 45.95 KG/M2 | SYSTOLIC BLOOD PRESSURE: 105 MMHG | TEMPERATURE: 98 F | HEART RATE: 89 BPM | DIASTOLIC BLOOD PRESSURE: 57 MMHG | OXYGEN SATURATION: 92 % | RESPIRATION RATE: 17 BRPM

## 2021-09-13 PROCEDURE — 74011250637 HC RX REV CODE- 250/637: Performed by: SURGERY

## 2021-09-13 PROCEDURE — 94760 N-INVAS EAR/PLS OXIMETRY 1: CPT

## 2021-09-13 RX ORDER — OXYCODONE HYDROCHLORIDE 5 MG/1
10 TABLET ORAL
Qty: 12 TABLET | Refills: 0 | Status: SHIPPED | OUTPATIENT
Start: 2021-09-13 | End: 2021-09-16

## 2021-09-13 RX ADMIN — OXYCODONE 10 MG: 5 TABLET ORAL at 12:50

## 2021-09-13 RX ADMIN — OXYCODONE 10 MG: 5 TABLET ORAL at 03:09

## 2021-09-13 RX ADMIN — Medication 1 AMPULE: at 10:00

## 2021-09-13 RX ADMIN — GABAPENTIN 300 MG: 300 CAPSULE ORAL at 09:59

## 2021-09-13 NOTE — PROGRESS NOTES
Pt stable, + flatus, + BM. Voiding, amb around unit. Pt with d/c order and states ready for d/c. Pt given d/c information both written and verbally and voices understanding. Pt given opportunity to have any questions answered. Pt will f/u OP. No PIV in place. Pt left via w/c with staff escort, friend providing transportation.

## 2021-09-13 NOTE — DISCHARGE INSTRUCTIONS
Patient Education        Abdominal Hernia Repair: What to Expect at Home  Your Recovery  After surgery to repair your hernia, you are likely to have pain for a few days. You may also feel tired and have less energy than normal. This is common. You should feel better after a few days and will probably feel much better in 7 days. For several weeks you may feel discomfort or pulling in the hernia repair when you move. You may have some bruising around the area of the repair. This is normal.  This care sheet gives you a general idea about how long it will take for you to recover. But each person recovers at a different pace. Follow the steps below to get better as quickly as possible. How can you care for yourself at home? Activity    · Rest when you feel tired. Getting enough sleep will help you recover.     · Try to walk each day. Start by walking a little more than you did the day before. Bit by bit, increase the amount you walk. Walking boosts blood flow and helps prevent pneumonia and constipation.     · If your doctor gives you an abdominal binder to wear, use it as directed. This is an elastic bandage that wraps around your belly and upper hips. It helps support your belly muscles after surgery.     · Avoid strenuous activities, such as biking, jogging, weight lifting, or aerobic exercise, until your doctor says it is okay.     · Avoid lifting anything that would make you strain. This may include heavy grocery bags and milk containers, a heavy briefcase or backpack, cat litter or dog food bags, a vacuum , or a child.     · Ask your doctor when you can drive again.     · Most people are able to return to work in 2 weeks after surgery. But if your job requires that you do heavy lifting or strenuous activity, you may need to take 4 to 6 weeks off from work. We will determine return to work at your follow-up appointment     · You may shower 24 to 48 hours after surgery, if your doctor okays it.  Pat the cut (incision) dry. Do not take a bath for the first 2 weeks, or until your doctor tells you it is okay.     · Ask your doctor when it is okay for you to have sex. Diet    · You can eat your normal diet. If your stomach is upset, try bland, low-fat foods like plain rice, broiled chicken, toast, and yogurt.     · Drink plenty of fluids (unless your doctor tells you not to).     · You may notice that your bowel movements are not regular right after your surgery. This is common. Avoid constipation and straining with bowel movements. You may want to take a fiber supplement every day. If you have not had a bowel movement after a couple of days, ask your doctor about taking a mild laxative. Medicines    · Your doctor will tell you if and when you can restart your medicines. You will also be given instructions about taking any new medicines.     · If you take aspirin or some other blood thinner, ask your doctor if and when to start taking it again. Make sure that you understand exactly what your doctor wants you to do.     · Be safe with medicines. Take pain medicines exactly as directed. ? If the doctor gave you a prescription medicine for pain, take it as prescribed. ? If you are not taking a prescription pain medicine, ask your doctor if you can take an over-the-counter medicine.     · If your doctor prescribed antibiotics, take them as directed. Do not stop taking them just because you feel better. You need to take the full course of antibiotics.     · If you think your pain medicine is making you sick to your stomach:  ? Take your medicine after meals (unless your doctor has told you not to). ? Ask your doctor for a different pain medicine. Incision care    · If you have strips of tape on the cut (incision) the doctor made, leave the tape on for a week or until it falls off.  Or follow your doctor's instructions for removing the tape.     · If you have staples closing the cut, you will need to visit your doctor in 1 to 2 weeks to have them removed.     · Wash the area daily with warm, soapy water, and pat it dry. Don't use hydrogen peroxide or alcohol, which can slow healing. You may cover the area with a gauze bandage if it weeps or rubs against clothing. Change the bandage every day. Other instructions    · Hold a pillow over your incision when you cough or take deep breaths. This will support your belly and decrease your pain.     · Do breathing exercises at home as instructed by your doctor. This will help prevent pneumonia.     · If you had laparoscopic surgery, you may also have pain in your shoulder. The pain usually lasts about a day or two. Follow-up care is a key part of your treatment and safety. Be sure to make and go to all appointments, and call your doctor if you are having problems. It's also a good idea to know your test results and keep a list of the medicines you take. When should you call for help? Call 911 anytime you think you may need emergency care. For example, call if:    · You passed out (lost consciousness).     · You are short of breath. Call your doctor now or seek immediate medical care if:    · You are sick to your stomach and cannot drink fluids.     · You have signs of a blood clot in your leg (called a deep vein thrombosis), such as:  ? Pain in your calf, back of the knee, thigh, or groin. ? Redness and swelling in your leg or groin.     · You have signs of infection, such as:  ? Increased pain, swelling, warmth, or redness. ? Red streaks leading from the incision. ? Pus draining from the incision. ? A fever.     · You cannot pass stools or gas.     · You have pain that does not get better after you take pain medicine.     · You have loose stitches, or your incision comes open.     · Bright red blood has soaked through the bandage over your incision. Watch closely for changes in your health, and be sure to contact your doctor if you have any problems.   Where can you learn more? Go to http://sarath-josé luis.info/  Enter B577 in the search box to learn more about \"Abdominal Hernia Repair: What to Expect at Home. \"  Current as of: April 15, 2020               Content Version: 12.8  © 4289-5765 [employer]wise, [employer]. Care instructions adapted under license by [employer] (which disclaims liability or warranty for this information). If you have questions about a medical condition or this instruction, always ask your healthcare professional. [place] [address] any warranty or liability for your use of this information. Please call [phone] to make a follow up appointment with Catherine Garrett or Heather Cardenas in 2 weeks     3 [facility] Surgical Specialist of [address], [address] [place] [address] [address]  [phone]  Fax [phone]

## 2021-09-13 NOTE — PROGRESS NOTES
End of Shift Note    Bedside shift change report given to Keisha Ayers  (oncoming nurse) by Patrick Reyes RN (offgoing nurse). Report included the following information SBAR, Kardex, Intake/Output, MAR and Recent Results    Shift worked:  7p-7a     Shift summary and any significant changes:    Pt had uneventful shift, pt medicated with prn oxycodone, pt assisted to bathroom     Concerns for physician to address: none     Zone phone for oncoming shift:  [phone]       Activity:  Activity Level: Up with Assistance  Number times ambulated in hallways past shift: 0  Number of times OOB to chair past shift: 0    Cardiac:   Cardiac Monitoring: No      Cardiac Rhythm: Sinus Rhythm    Access:   Current line(s): no access     Genitourinary:   Urinary status: voiding    Respiratory:   O2 Device: None (Room air)  Chronic home O2 use?: NO  Incentive spirometer at bedside: YES  Actual Volume (ml): 1000 ml  GI:  Last Bowel Movement Date: 09/12/21  Current diet:  ADULT DIET Regular  Passing flatus: YES  Tolerating current diet: YES       Pain Management:   Patient states pain is manageable on current regimen: YES    Skin:  Mikey Score: 21  Interventions: increase time out of bed    Patient Safety:  Fall Score:  Total Score: 2  Interventions: gripper socks       Length of Stay:  Expected LOS: - - -  Actual LOS: 2      Patrick Reyes RN

## 2021-09-13 NOTE — PROGRESS NOTES
Transition of Care Plan:    RUR:5%  Disposition:Home  Follow up appointments:on AVS  DME needed:n/a  Transportation at [address] or means to access home:        IM Medicare Letter:n/a  Is patient a BCPI-A Bundle:  n/a         If yes, was Bundle Letter given?:     Caregiver Contact:  Discharge Caregiver contacted prior to discharge? Patient is d/c home today without any needs or concerns. Follow-up appointment is on the AVS.  Patient is ready for d/c from CM standpoint. Care Management Interventions  PCP Verified by CM: Yes (2020)  Mode of Transport at Discharge: Other (see comment) (friend)  Transition of Care Consult (CM Consult): Discharge Planning  Discharge Durable Medical Equipment:  (No home O2.   DME (cane))  Support Systems: Child(amy)  Confirm Follow Up Transport: Family  Discharge Location  Discharge Placement: [address] [place] [address] St

## 2021-09-13 NOTE — DISCHARGE SUMMARY
DISCHARGE SUMMARY      Patient ID:  Ganesh Drew  [id number]  54 y.o.  1970    Admit date: 9/10/2021    Discharge date and time: No discharge date for patient encounter. Admitting Physician: Anay Salinas MD     Discharge Physician: Cheryl Scruggs MD    Admission Diagnoses: Umbilical hernia with obstruction [K42.0]    Discharge Diagnoses: Principal Problem:    Umbilical hernia with obstruction, without gangrene (9/11/2021)    Active Problems:    Umbilical hernia with obstruction (9/11/2021)        Procedures: Procedure(s):  LAPAROSCOPIC  UMBILICAL HERNIA REPAIR    Consults: general surgery        Hospital Course:   Patient admitted with incarcerated incisional hernia causing SBO. Underwent uneventful reduction and repair of the hernia. Obstruction resolved. On discharge, patient pain is controlled with PO medication. She is ambulating, and tolerating lizbeth diet. D/C Disposition: home     Patient Instructions:   Current Discharge Medication List      START taking these medications    Details   oxyCODONE IR (ROXICODONE) 5 mg immediate release tablet Take 2 Tablets by mouth every four (4) hours as needed for Pain for up to 3 days. Max Daily Amount: 60 mg.  Qty: 12 Tablet, Refills: 0    Associated Diagnoses: Umbilical hernia with obstruction         CONTINUE these medications which have NOT CHANGED    Details   ergocalciferol (ERGOCALCIFEROL) 50,000 unit capsule Take 1 Cap by mouth every seven (7) days. Qty: 12 Cap, Refills: 2    Associated Diagnoses: Vitamin D deficiency      diclofenac EC (VOLTAREN) 75 mg EC tablet Take 1 Tablet by mouth two (2) times a day.  As needed for hip  Qty: 60 Tablet, Refills: 1    Associated Diagnoses: Right hip pain             Diet: resume pre-hospital diet    Activities:   1) No work until follow-up in the office  2) no driving on pain medication  3) no strenuous activity  4) no heavy lifting     Follow-up with Cheryl Scruggs MD in 2 weeks        Signed:  Cheryl Scruggs MD  9/13/2021  10:48 AM

## 2021-09-14 ENCOUNTER — TELEPHONE (OUTPATIENT)
Dept: SURGERY | Age: 51
End: 2021-09-14

## 2021-09-14 RX ORDER — ONDANSETRON 4 MG/1
4 TABLET, FILM COATED ORAL
Qty: 30 TABLET | Refills: 2 | Status: SHIPPED | OUTPATIENT
Start: 2021-09-14 | End: 2021-09-15

## 2021-09-14 NOTE — TELEPHONE ENCOUNTER
Pt had surg on sat w/ dr Teo Hernandez. Was d/c yesterday but was not able to sleep last night. She's nauseated and wants know if this is normal. Pt sounded close to tears.  Please call [phone]

## 2021-09-15 ENCOUNTER — APPOINTMENT (OUTPATIENT)
Dept: CT IMAGING | Age: 51
End: 2021-09-15
Attending: EMERGENCY MEDICINE
Payer: COMMERCIAL

## 2021-09-15 ENCOUNTER — HOSPITAL ENCOUNTER (OUTPATIENT)
Age: 51
Setting detail: OBSERVATION
Discharge: HOME | End: 2021-09-18
Attending: EMERGENCY MEDICINE | Admitting: SURGERY
Payer: COMMERCIAL

## 2021-09-15 DIAGNOSIS — K56.609 SMALL BOWEL OBSTRUCTION (HCC): Primary | ICD-10-CM

## 2021-09-15 PROCEDURE — 83690 ASSAY OF LIPASE: CPT

## 2021-09-15 PROCEDURE — 36415 COLL VENOUS BLD VENIPUNCTURE: CPT

## 2021-09-15 PROCEDURE — 85025 COMPLETE CBC W/AUTO DIFF WBC: CPT

## 2021-09-15 PROCEDURE — 99284 EMERGENCY DEPT VISIT MOD MDM: CPT

## 2021-09-15 PROCEDURE — 80053 COMPREHEN METABOLIC PANEL: CPT

## 2021-09-15 RX ORDER — FENTANYL CITRATE 50 UG/ML
50 INJECTION, SOLUTION INTRAMUSCULAR; INTRAVENOUS
Status: COMPLETED | OUTPATIENT
Start: 2021-09-15 | End: 2021-09-16

## 2021-09-15 RX ORDER — ONDANSETRON 2 MG/ML
4 INJECTION INTRAMUSCULAR; INTRAVENOUS ONCE
Status: COMPLETED | OUTPATIENT
Start: 2021-09-15 | End: 2021-09-16

## 2021-09-15 RX ORDER — MORPHINE SULFATE 2 MG/ML
4 INJECTION, SOLUTION INTRAMUSCULAR; INTRAVENOUS
Status: COMPLETED | OUTPATIENT
Start: 2021-09-15 | End: 2021-09-16

## 2021-09-16 ENCOUNTER — APPOINTMENT (OUTPATIENT)
Dept: CT IMAGING | Age: 51
End: 2021-09-16
Attending: EMERGENCY MEDICINE
Payer: COMMERCIAL

## 2021-09-16 PROBLEM — K91.89 POSTOPERATIVE ILEUS (HCC): Status: ACTIVE | Noted: 2021-09-16

## 2021-09-16 PROBLEM — K56.7 POSTOPERATIVE ILEUS (HCC): Status: ACTIVE | Noted: 2021-09-16

## 2021-09-16 LAB
ALBUMIN SERPL-MCNC: 2.9 G/DL (ref 3.5–5)
ALBUMIN/GLOB SERPL: 0.7 {RATIO} (ref 1.1–2.2)
ALP SERPL-CCNC: 81 U/L (ref 45–117)
ALT SERPL-CCNC: 54 U/L (ref 12–78)
ANION GAP SERPL CALC-SCNC: 6 MMOL/L (ref 5–15)
APPEARANCE UR: ABNORMAL
AST SERPL-CCNC: 27 U/L (ref 15–37)
BACTERIA URNS QL MICRO: ABNORMAL /HPF
BASOPHILS # BLD: 0 K/UL (ref 0–0.1)
BASOPHILS NFR BLD: 0 % (ref 0–1)
BILIRUB SERPL-MCNC: 1.2 MG/DL (ref 0.2–1)
BILIRUB UR QL CFM: NEGATIVE
BUN SERPL-MCNC: 9 MG/DL (ref 6–20)
BUN/CREAT SERPL: 16 (ref 12–20)
CALCIUM SERPL-MCNC: 8.8 MG/DL (ref 8.5–10.1)
CHLORIDE SERPL-SCNC: 101 MMOL/L (ref 97–108)
CO2 SERPL-SCNC: 28 MMOL/L (ref 21–32)
COLOR UR: ABNORMAL
CREAT SERPL-MCNC: 0.55 MG/DL (ref 0.55–1.02)
DIFFERENTIAL METHOD BLD: ABNORMAL
EOSINOPHIL # BLD: 0.3 K/UL (ref 0–0.4)
EOSINOPHIL NFR BLD: 2 % (ref 0–7)
EPITH CASTS URNS QL MICRO: ABNORMAL /LPF
ERYTHROCYTE [DISTWIDTH] IN BLOOD BY AUTOMATED COUNT: 12 % (ref 11.5–14.5)
GLOBULIN SER CALC-MCNC: 4.1 G/DL (ref 2–4)
GLUCOSE SERPL-MCNC: 125 MG/DL (ref 65–100)
GLUCOSE UR STRIP.AUTO-MCNC: NEGATIVE MG/DL
HCT VFR BLD AUTO: 39.6 % (ref 35–47)
HGB BLD-MCNC: 13.3 G/DL (ref 11.5–16)
HGB UR QL STRIP: NEGATIVE
IMM GRANULOCYTES # BLD AUTO: 0.3 K/UL (ref 0–0.04)
IMM GRANULOCYTES NFR BLD AUTO: 2 % (ref 0–0.5)
KETONES UR QL STRIP.AUTO: 15 MG/DL
LEUKOCYTE ESTERASE UR QL STRIP.AUTO: ABNORMAL
LIPASE SERPL-CCNC: 55 U/L (ref 73–393)
LYMPHOCYTES # BLD: 1.8 K/UL (ref 0.8–3.5)
LYMPHOCYTES NFR BLD: 14 % (ref 12–49)
MCH RBC QN AUTO: 33.7 PG (ref 26–34)
MCHC RBC AUTO-ENTMCNC: 33.6 G/DL (ref 30–36.5)
MCV RBC AUTO: 100.3 FL (ref 80–99)
MONOCYTES # BLD: 1.8 K/UL (ref 0–1)
MONOCYTES NFR BLD: 14 % (ref 5–13)
NEUTS SEG # BLD: 8.8 K/UL (ref 1.8–8)
NEUTS SEG NFR BLD: 68 % (ref 32–75)
NITRITE UR QL STRIP.AUTO: NEGATIVE
NRBC # BLD: 0 K/UL (ref 0–0.01)
NRBC BLD-RTO: 0 PER 100 WBC
PH UR STRIP: 6 [PH] (ref 5–8)
PLATELET # BLD AUTO: 256 K/UL (ref 150–400)
PMV BLD AUTO: 10.8 FL (ref 8.9–12.9)
POTASSIUM SERPL-SCNC: 3.4 MMOL/L (ref 3.5–5.1)
PROT SERPL-MCNC: 7 G/DL (ref 6.4–8.2)
PROT UR STRIP-MCNC: NEGATIVE MG/DL
RBC # BLD AUTO: 3.95 M/UL (ref 3.8–5.2)
RBC #/AREA URNS HPF: ABNORMAL /HPF (ref 0–5)
RBC MORPH BLD: ABNORMAL
SODIUM SERPL-SCNC: 135 MMOL/L (ref 136–145)
SP GR UR REFRACTOMETRY: 1.03 (ref 1–1.03)
UA: UC IF INDICATED,UAUC: ABNORMAL
UROBILINOGEN UR QL STRIP.AUTO: 1 EU/DL (ref 0.2–1)
WBC # BLD AUTO: 13 K/UL (ref 3.6–11)
WBC URNS QL MICRO: ABNORMAL /HPF (ref 0–4)

## 2021-09-16 PROCEDURE — 96375 TX/PRO/DX INJ NEW DRUG ADDON: CPT

## 2021-09-16 PROCEDURE — 99218 HC RM OBSERVATION: CPT

## 2021-09-16 PROCEDURE — 81001 URINALYSIS AUTO W/SCOPE: CPT

## 2021-09-16 PROCEDURE — 74011000636 HC RX REV CODE- 636: Performed by: EMERGENCY MEDICINE

## 2021-09-16 PROCEDURE — 96374 THER/PROPH/DIAG INJ IV PUSH: CPT

## 2021-09-16 PROCEDURE — 74011250636 HC RX REV CODE- 250/636: Performed by: SURGERY

## 2021-09-16 PROCEDURE — 74011250636 HC RX REV CODE- 250/636: Performed by: EMERGENCY MEDICINE

## 2021-09-16 PROCEDURE — 74011250637 HC RX REV CODE- 250/637: Performed by: SURGERY

## 2021-09-16 PROCEDURE — 74177 CT ABD & PELVIS W/CONTRAST: CPT

## 2021-09-16 PROCEDURE — 96376 TX/PRO/DX INJ SAME DRUG ADON: CPT

## 2021-09-16 RX ORDER — SODIUM CHLORIDE 0.9 % (FLUSH) 0.9 %
5-40 SYRINGE (ML) INJECTION EVERY 8 HOURS
Status: DISCONTINUED | OUTPATIENT
Start: 2021-09-16 | End: 2021-09-18 | Stop reason: HOSPADM

## 2021-09-16 RX ORDER — NALOXONE HYDROCHLORIDE 0.4 MG/ML
0.4 INJECTION, SOLUTION INTRAMUSCULAR; INTRAVENOUS; SUBCUTANEOUS AS NEEDED
Status: DISCONTINUED | OUTPATIENT
Start: 2021-09-16 | End: 2021-09-18 | Stop reason: HOSPADM

## 2021-09-16 RX ORDER — ONDANSETRON 2 MG/ML
4 INJECTION INTRAMUSCULAR; INTRAVENOUS
Status: DISCONTINUED | OUTPATIENT
Start: 2021-09-16 | End: 2021-09-18 | Stop reason: HOSPADM

## 2021-09-16 RX ORDER — DIPHENHYDRAMINE HYDROCHLORIDE 50 MG/ML
25 INJECTION, SOLUTION INTRAMUSCULAR; INTRAVENOUS
Status: DISCONTINUED | OUTPATIENT
Start: 2021-09-16 | End: 2021-09-18 | Stop reason: HOSPADM

## 2021-09-16 RX ORDER — DEXTROSE, SODIUM CHLORIDE, AND POTASSIUM CHLORIDE 5; .45; .15 G/100ML; G/100ML; G/100ML
50 INJECTION INTRAVENOUS CONTINUOUS
Status: DISCONTINUED | OUTPATIENT
Start: 2021-09-16 | End: 2021-09-18 | Stop reason: HOSPADM

## 2021-09-16 RX ORDER — LORAZEPAM 2 MG/ML
1 INJECTION INTRAMUSCULAR
Status: DISCONTINUED | OUTPATIENT
Start: 2021-09-16 | End: 2021-09-18 | Stop reason: HOSPADM

## 2021-09-16 RX ORDER — SODIUM CHLORIDE 0.9 % (FLUSH) 0.9 %
5-40 SYRINGE (ML) INJECTION AS NEEDED
Status: DISCONTINUED | OUTPATIENT
Start: 2021-09-16 | End: 2021-09-18 | Stop reason: HOSPADM

## 2021-09-16 RX ORDER — HYDROMORPHONE HYDROCHLORIDE 1 MG/ML
0.5 INJECTION, SOLUTION INTRAMUSCULAR; INTRAVENOUS; SUBCUTANEOUS
Status: DISCONTINUED | OUTPATIENT
Start: 2021-09-16 | End: 2021-09-18 | Stop reason: HOSPADM

## 2021-09-16 RX ORDER — ACETAMINOPHEN 325 MG/1
650 TABLET ORAL
Status: DISCONTINUED | OUTPATIENT
Start: 2021-09-16 | End: 2021-09-18 | Stop reason: HOSPADM

## 2021-09-16 RX ADMIN — Medication 10 ML: at 17:52

## 2021-09-16 RX ADMIN — POTASSIUM CHLORIDE, DEXTROSE MONOHYDRATE AND SODIUM CHLORIDE 125 ML/HR: 150; 5; 450 INJECTION, SOLUTION INTRAVENOUS at 20:36

## 2021-09-16 RX ADMIN — ONDANSETRON 4 MG: 2 INJECTION INTRAMUSCULAR; INTRAVENOUS at 20:53

## 2021-09-16 RX ADMIN — SODIUM CHLORIDE 1000 ML: 9 INJECTION, SOLUTION INTRAVENOUS at 02:57

## 2021-09-16 RX ADMIN — Medication 5 ML: at 21:16

## 2021-09-16 RX ADMIN — ONDANSETRON 4 MG: 2 INJECTION INTRAMUSCULAR; INTRAVENOUS at 02:57

## 2021-09-16 RX ADMIN — FENTANYL CITRATE 50 MCG: 0.05 INJECTION, SOLUTION INTRAMUSCULAR; INTRAVENOUS at 02:56

## 2021-09-16 RX ADMIN — POTASSIUM CHLORIDE, DEXTROSE MONOHYDRATE AND SODIUM CHLORIDE 125 ML/HR: 150; 5; 450 INJECTION, SOLUTION INTRAVENOUS at 12:16

## 2021-09-16 RX ADMIN — IOPAMIDOL 100 ML: 755 INJECTION, SOLUTION INTRAVENOUS at 01:10

## 2021-09-16 RX ADMIN — NALOXEGOL OXALATE 25 MG: 25 TABLET, FILM COATED ORAL at 12:15

## 2021-09-16 RX ADMIN — MORPHINE SULFATE 4 MG: 2 INJECTION, SOLUTION INTRAMUSCULAR; INTRAVENOUS at 20:36

## 2021-09-16 NOTE — ED PROVIDER NOTES
EMERGENCY DEPARTMENT HISTORY AND PHYSICAL EXAM      Date: 9/15/2021  Patient Name: Antonio Lilly    History of Presenting Illness     Chief Complaint   Patient presents with    Abdominal Pain     had umbilical hernia surgery repair 21- abd pain with vomiting and diarrhea since yesterday       History Provided By: Patient    HPI: Antonio Lilly, 54 y.o. female presents to the ED with cc of abdominal pain. 44-year-old female with a history of recent incarcerated umbilical hernia status post laparoscopic reduction and repair presents emergency department chiefly abdominal pain. Patient reports prior to discharge was tolerating a liquid diet and solids. Patient went home and states developed all over generalized abdominal pain. Moderate intensity. Worse with eating improved with vomiting. Reports multiple episodes of emesis. Reports has had some diarrhea and passes flatus. Patient denies any headaches, chest pain, shortness of breath. Denies any fevers or chills. There are no other complaints, changes, or physical findings at this time. PCP: Eris Richardson NP    No current facility-administered medications on file prior to encounter. Current Outpatient Medications on File Prior to Encounter   Medication Sig Dispense Refill    oxyCODONE IR (ROXICODONE) 5 mg immediate release tablet Take 2 Tablets by mouth every four (4) hours as needed for Pain for up to 3 days. Max Daily Amount: 60 mg. 12 Tablet 0    diclofenac EC (VOLTAREN) 75 mg EC tablet Take 1 Tablet by mouth two (2) times a day.  As needed for hip 60 Tablet 1       Past History     Past Medical History:  Past Medical History:   Diagnosis Date    Spinal stenosis        Past Surgical History:  Past Surgical History:   Procedure Laterality Date    HX  SECTION      x2    HX GYN      HX HERNIA REPAIR  2021    HX LAPAROSCOPIC SUPRACERVICAL HYSTERECTOMY      HX OTHER SURGICAL      jaw surgery    HX TUBAL LIGATION Family History:  Family History   Problem Relation Age of Onset    Breast Cancer Maternal Aunt         age unknown       Social History:  Social History     Tobacco Use    Smoking status: Never Smoker    Smokeless tobacco: Never Used   Substance Use Topics    Alcohol use: Yes     Comment: occasionally    Drug use: No       Allergies: Allergies   Allergen Reactions    Other Food Swelling     Allergy to jicama root         Review of Systems   Review of Systems   Constitutional: Negative for activity change, chills and fever. HENT: Negative for facial swelling and voice change. Eyes: Negative for redness. Respiratory: Negative for cough, shortness of breath and wheezing. Cardiovascular: Negative for chest pain and leg swelling. Gastrointestinal: Positive for abdominal pain, diarrhea, nausea and vomiting. Genitourinary: Negative for decreased urine volume. Musculoskeletal: Negative for gait problem. Skin: Negative for pallor and rash. Neurological: Negative for tremors and facial asymmetry. Psychiatric/Behavioral: Negative for agitation. All other systems reviewed and are negative. Physical Exam   Physical Exam  Vitals and nursing note reviewed. Constitutional:       Comments: 54 YOF, sitting in chair, no distress   HENT:      Head: Normocephalic and atraumatic. Cardiovascular:      Rate and Rhythm: Normal rate and regular rhythm. Heart sounds: No murmur heard. No friction rub. No gallop. Pulmonary:      Effort: Pulmonary effort is normal.      Breath sounds: Normal breath sounds. Abdominal:      General: Abdomen is protuberant. There is distension. Palpations: Abdomen is soft. Tenderness: There is generalized abdominal tenderness. There is no guarding or rebound. Hernia: No hernia is present. Comments: There are small incisions, C/D/I   Musculoskeletal:         General: Normal range of motion. Cervical back: Normal range of motion. Skin:     General: Skin is warm. Capillary Refill: Capillary refill takes less than 2 seconds. Neurological:      General: No focal deficit present. Mental Status: She is alert. Psychiatric:         Mood and Affect: Mood normal.         Diagnostic Study Results     Labs -     Recent Results (from the past 12 hour(s))   CBC WITH AUTOMATED DIFF    Collection Time: 09/15/21 11:50 PM   Result Value Ref Range    WBC 13.0 (H) 3.6 - 11.0 K/uL    RBC 3.95 3.80 - 5.20 M/uL    HGB 13.3 11.5 - 16.0 g/dL    HCT 39.6 35.0 - 47.0 %    .3 (H) 80.0 - 99.0 FL    MCH 33.7 26.0 - 34.0 PG    MCHC 33.6 30.0 - 36.5 g/dL    RDW 12.0 11.5 - 14.5 %    PLATELET 101 288 - 521 K/uL    MPV 10.8 8.9 - 12.9 FL    NRBC 0.0 0  WBC    ABSOLUTE NRBC 0.00 0.00 - 0.01 K/uL    NEUTROPHILS 68 32 - 75 %    LYMPHOCYTES 14 12 - 49 %    MONOCYTES 14 (H) 5 - 13 %    EOSINOPHILS 2 0 - 7 %    IMMATURE GRANULOCYTES 2 (H) 0.0 - 0.5 %    ABS. NEUTROPHILS 8.8 (H) 1.8 - 8.0 K/UL    ABS. LYMPHOCYTES 1.8 0.8 - 3.5 K/UL    ABS. MONOCYTES 1.8 (H) 0.0 - 1.0 K/UL    ABS. EOSINOPHILS 0.3 0.0 - 0.4 K/UL    ABS. IMM. GRANS. 0.3 (H) 0.00 - 0.04 K/UL    DF SMEAR SCANNED      RBC COMMENTS MACROCYTOSIS  1+        BASOPHILS 0 0 - 1 %    ABS. BASOPHILS 0.0 0.0 - 0.1 K/UL   METABOLIC PANEL, COMPREHENSIVE    Collection Time: 09/15/21 11:50 PM   Result Value Ref Range    Sodium 135 (L) 136 - 145 mmol/L    Potassium 3.4 (L) 3.5 - 5.1 mmol/L    Chloride 101 97 - 108 mmol/L    CO2 28 21 - 32 mmol/L    Anion gap 6 5 - 15 mmol/L    Glucose 125 (H) 65 - 100 mg/dL    BUN 9 6 - 20 MG/DL    Creatinine 0.55 0.55 - 1.02 MG/DL    BUN/Creatinine ratio 16 12 - 20      GFR est AA >60 >60 ml/min/1.73m2    GFR est non-AA >60 >60 ml/min/1.73m2    Calcium 8.8 8.5 - 10.1 MG/DL    Bilirubin, total 1.2 (H) 0.2 - 1.0 MG/DL    ALT (SGPT) 54 12 - 78 U/L    AST (SGOT) 27 15 - 37 U/L    Alk.  phosphatase 81 45 - 117 U/L    Protein, total 7.0 6.4 - 8.2 g/dL    Albumin 2.9 (L) 3.5 - 5.0 g/dL    Globulin 4.1 (H) 2.0 - 4.0 g/dL    A-G Ratio 0.7 (L) 1.1 - 2.2     LIPASE    Collection Time: 09/15/21 11:50 PM   Result Value Ref Range    Lipase 55 (L) 73 - 393 U/L       Radiologic Studies -   CT ABD PELV W CONT   Final Result   Mid small bowel obstruction, with no visualized mass. No evidence of recurrent   ventral hernia. Hepatic steatosis. CT Results  (Last 48 hours)               09/16/21 0110  CT ABD PELV W CONT Final result    Impression:  Mid small bowel obstruction, with no visualized mass. No evidence of recurrent   ventral hernia. Hepatic steatosis. Narrative:  EXAM: CT ABD PELV W CONT       INDICATION: abd pain, recent surgery, n/v        COMPARISON: September 10, 2021        CONTRAST: 100 mL of Isovue-370. TECHNIQUE:    Following the uneventful intravenous administration of contrast, thin axial   images were obtained through the abdomen and pelvis. Coronal and sagittal   reconstructions were generated. Oral contrast was not administered. CT dose   reduction was achieved through use of a standardized protocol tailored for this   examination and automatic exposure control for dose modulation. FINDINGS:    LOWER THORAX: No significant abnormality in the incidentally imaged lower chest.   LIVER: Mild diffuse steatosis   BILIARY TREE: Gallbladder is within normal limits. CBD is not dilated. SPLEEN: within normal limits. PANCREAS: No mass or ductal dilatation. ADRENALS: Unremarkable. KIDNEYS: No mass, calculus, or hydronephrosis. STOMACH: Unremarkable. SMALL BOWEL: Diffuse small bowel distention, with a transition point in the   lower abdomen, anteriorly. COLON: No dilatation or wall thickening. APPENDIX: Normal.   PERITONEUM: No ascites or pneumoperitoneum. RETROPERITONEUM: No lymphadenopathy or aortic aneurysm. REPRODUCTIVE ORGANS: Unremarkable. URINARY BLADDER: No mass or calculus. BONES: No destructive bone lesion.  Unchanged old T12 compression deformity. ABDOMINAL WALL: Previously seen supraumbilical ventral hernia has been   surgically repaired. ADDITIONAL COMMENTS: N/A               CXR Results  (Last 48 hours)    None          Medical Decision Making   I am the first provider for this patient. I reviewed the vital signs, available nursing notes, past medical history, past surgical history, family history and social history. Vital Signs-Reviewed the patient's vital signs. Patient Vitals for the past 12 hrs:   Temp Pulse Resp BP SpO2   09/16/21 0350     95 %   09/16/21 0347    112/71    09/15/21 2331 98 °F (36.7 °C) (!) 117 22 125/81 98 %     Records Reviewed: Nursing Notes, Old Medical Records, Previous Radiology Studies and Previous Laboratory Studies    Provider Notes (Medical Decision Making):     79-year-old female with history of incarcerated umbilical hernia status post repair presents emergency department with treatment of abdominal pain and vomiting. Vitals are notable for mild tachycardia. Will check basic labs, lipase and CT abdomen pelvis. No evidence of incarcerated hernia, check for abscess, ileus, small bowel obstruction. Medicate for pain and nausea. ED Course:   Initial assessment performed. The patients presenting problems have been discussed, and they are in agreement with the care plan formulated and outlined with them. I have encouraged them to ask questions as they arise throughout their visit. ED Course as of Sep 16 0420   Thu Sep 16, 2021   0210 CT shows SBO in the mid-bowel, clinically I suspect this is a post-op ileus. Updated patient, no vomiting, will hold on NG. D/w Dr. Mir Duty. [MB]      ED Course User Index  [MB] MD Vanessa Light MD      Disposition:    Admitted      Diagnosis     Clinical Impression:   1.  Small bowel obstruction (HCC)        Attestations:    Vanessa Gamboa MD    Please note that this dictation was completed with [employer], the computer voice recognition software. Quite often unanticipated grammatical, syntax, homophones, and other interpretive errors are inadvertently transcribed by the computer software. Please disregard these errors. Please excuse any errors that have escaped final proofreading. Thank you.

## 2021-09-16 NOTE — ED NOTES
Report received from Balbir [facility]. They advised of the patient's chief complaint, current status, orders completed (to include IV access/medications/radiology testing), outstanding orders that still need to be completed, and the treatment plan. Questions asked and answered prior to assumption of care.

## 2021-09-16 NOTE — ED NOTES
TRANSFER - IN REPORT:    Verbal report received from [facility] on [address] Report consisted of patients Situation, Background, Assessment and   Recommendations(SBAR). Information from the following report(s) SBAR and ED Summary was reviewed with the receiving nurse. Opportunity for questions and clarification was provided. 0800 Pt assisted to bedside commode, 1 episode of diarrhea    0947 GI at bedside    1117 Bedside and Verbal shift change report given to Clare Castañeda (oncoming nurse) by Alok Raza (offgoing nurse). Report included the following information SBAR, ED Summary and Recent Results. [phone] Patient is being transferred to [facility] Clinical OBS, Room # 66 [phone] 76. Report given to Kandice Mata RN on [address] for routine progression of care. Report consisted of the following information SBAR, Kardex, ED Summary, Intake/Output, MAR and Recent Results. Patient transferred to receiving unit by: Transport. Outstanding consults needed: No     Next labs due: Yes    The following personal items will be sent with the patient during transfer to the floor:     All valuables:    Cardiac monitoring ordered: No     The following CURRENT information was reported to the receiving RN:    Code status: Full Code at time of transfer    Last set of vital signs:  Vital Signs  Level of Consciousness: Alert (0) (09/16/21 1537)  Temp: 98.2 °F (36.8 °C) (09/16/21 1537)  Temp Source: Oral (09/16/21 1537)  Pulse (Heart Rate): 91 (09/16/21 1634)  Heart Rate Source: Monitor (09/16/21 0747)  Cardiac Rhythm: Sinus Rhythm (09/16/21 0749)  Resp Rate: 18 (09/16/21 1634)  BP: 105/72 (09/16/21 1517)  MAP (Monitor): 84 (09/16/21 1517)  MAP (Calculated): 83 (09/16/21 1517)  BP 1 Location: Right upper arm (09/16/21 0747)  BP 1 Method: Automatic (09/16/21 0747)  BP Patient Position: At rest (09/16/21 0747)  MEWS Score: 1 (09/16/21 0747)         Oxygen Therapy  O2 Sat (%): 96 % (09/16/21 1634)  Pulse via Oximetry: 91 beats per minute (09/16/21 1634)  O2 Device: None (09/16/21 0747)      Last pain assessment:  Pain 1  Pain Scale 1: Numeric (0 - 10)  Pain Intensity 1: 2  Pain Onset 1: yesterday      Wounds: No     Urinary catheter: voiding  Is there a gaines order: No     LDAs:       Peripheral IV 09/16/21 Anterior;Distal;Left Forearm (Active)         Opportunity for questions and clarification was provided.     Jany Rosales RN

## 2021-09-16 NOTE — H&P
Surgery Consult    Subjective:      Patrick Yeh is a 54 y.o. female who is being seen for evaluation of abdominal pain. The pain is located in the diffusely. Pain is described as dull and aching and measures 6/10 in intensity. Onset of pain was a few days ago. Aggravating factors include movement, activity and pressure. Alleviating factors include none. Associated symptoms include nausea and vomiting. Patient is 6 days out from a lap incisional hernia repair.       Patient Active Problem List    Diagnosis Date Noted    Postoperative ileus ([address] 10/82/5491    Umbilical hernia with obstruction, without gangrene     Umbilical hernia with obstruction 2021    Bilateral primary osteoarthritis of hip 01/15/2020    Obesity, morbid ([address] 01/10/2020     Past Medical History:   Diagnosis Date    Spinal stenosis       Past Surgical History:   Procedure Laterality Date    HX  SECTION      x2    HX GYN      HX HERNIA REPAIR  2021    HX LAPAROSCOPIC SUPRACERVICAL HYSTERECTOMY      HX OTHER SURGICAL      jaw surgery    HX TUBAL LIGATION        Social History     Tobacco Use    Smoking status: Never Smoker    Smokeless tobacco: Never Used   Substance Use Topics    Alcohol use: Yes     Comment: occasionally      Family History   Problem Relation Age of Onset    Breast Cancer Maternal Aunt         age unknown      Current Facility-Administered Medications   Medication Dose Route Frequency    dextrose 5% - 0.45% NaCl with KCl 20 mEq/L infusion  125 mL/hr IntraVENous CONTINUOUS    sodium chloride (NS) flush 5-40 mL  5-40 mL IntraVENous Q8H    sodium chloride (NS) flush 5-40 mL  5-40 mL IntraVENous PRN    acetaminophen (TYLENOL) tablet 650 mg  650 mg Oral Q6H PRN    naloxone (NARCAN) injection 0.4 mg  0.4 mg IntraVENous PRN    ondansetron (ZOFRAN) injection 4 mg  4 mg IntraVENous Q4H PRN    diphenhydrAMINE (BENADRYL) injection 25 mg  25 mg IntraVENous Q6H PRN    HYDROmorphone (DILAUDID) injection 0.5 mg  0.5 mg IntraVENous Q2H PRN    LORazepam (ATIVAN) injection 1 mg  1 mg IntraVENous Q6H PRN    naloxegoL (MOVANTIK) tablet 25 mg  25 mg Oral ACB    morphine injection 4 mg  4 mg IntraVENous ONCE PRN     Current Outpatient Medications   Medication Sig    oxyCODONE IR (ROXICODONE) 5 mg immediate release tablet Take 2 Tablets by mouth every four (4) hours as needed for Pain for up to 3 days. Max Daily Amount: 60 mg.    diclofenac EC (VOLTAREN) 75 mg EC tablet Take 1 Tablet by mouth two (2) times a day. As needed for hip      Allergies   Allergen Reactions    Other Food Swelling     Allergy to jicama root       Review of Systems:    A comprehensive review of systems was negative except for that written in the History of Present Illness. Objective:        Visit Vitals  /70   Pulse 96   Temp 98.1 °F (36.7 °C)   Resp 21   Ht 5' 5\" (1.651 m)   Wt 127.8 kg (281 lb 12 oz)   LMP 02/07/2011   SpO2 (!) 89%   BMI 46.89 kg/m²       Physical Exam:  GENERAL: alert, cooperative, no distress, appears stated age, LUNG: clear to auscultation bilaterally, HEART: regular rate and rhythm, S1, S2 normal, no murmur, click, rub or gallop, ABDOMEN: soft, non-tender. Bowel sounds hypoactive. No masses,  no organomegaly    Imaging:  images and reports reviewed    Lab/Data Review:  BMP:   Lab Results   Component Value Date/Time     (L) 09/15/2021 11:50 PM    K 3.4 (L) 09/15/2021 11:50 PM     09/15/2021 11:50 PM    CO2 28 09/15/2021 11:50 PM    AGAP 6 09/15/2021 11:50 PM     (H) 09/15/2021 11:50 PM    BUN 9 09/15/2021 11:50 PM    CREA 0.55 09/15/2021 11:50 PM    GFRAA >60 09/15/2021 11:50 PM    GFRNA >60 09/15/2021 11:50 PM     CBC:   Lab Results   Component Value Date/Time    WBC 13.0 (H) 09/15/2021 11:50 PM    HGB 13.3 09/15/2021 11:50 PM    HCT 39.6 09/15/2021 11:50 PM     09/15/2021 11:50 PM     CT - Mid small bowel obstruction, with no visualized mass.  No evidence of recurrent ventral hernia. Hepatic steatosis. (reviwed by me personally)       Assessment:     Abdominal pain, suspect likely inflammatory adhesions to the mesh or raw surface from the repair. Plan:     1. I recommend proceeding with Conservative therapy:  Bowel rest and IVF. Will add Movantik as well.

## 2021-09-16 NOTE — PROGRESS NOTES
Pharmacy Medication Reconciliation     The patient was interviewed regarding current PTA medication list, use and drug allergies;  patient present in room and obtained permission from patient to discuss drug regimen with visitor(s) present. The patient was questioned regarding use of any other inhalers, topical products, over the counter medications, herbal medications, vitamin products or ophthalmic/nasal/otic medication use. Allergy Update: Other food    Recommendations/Findings: The following amendments were made to the patient's active medication list on file at [facility]:   1) Additions: none  2) Deletions: none  3) Changes: none  Pertinent Findings: none    Clarified PTA med list with 9/11/21 discharge med rec. PTA medication list was corrected to the following:     Prior to Admission Medications   Prescriptions Last Dose Informant Taking?   diclofenac EC (VOLTAREN) 75 mg EC tablet   Yes   Sig: Take 1 Tablet by mouth two (2) times a day. As needed for hip   oxyCODONE IR (ROXICODONE) 5 mg immediate release tablet   Yes   Sig: Take 2 Tablets by mouth every four (4) hours as needed for Pain for up to 3 days. Max Daily Amount: 60 mg.       Facility-Administered Medications: None        Thank you,  DANIAL Del Rosario

## 2021-09-16 NOTE — PROGRESS NOTES
.End of Shift Note    Bedside shift change report given to Dominiqeu Phan (oncoming nurse) by Silvia Bender RN (offgoing nurse). Report included the following information SBAR, Procedure Summary, Intake/Output, MAR and Recent Results    Shift worked:   7 a - 7 p     Shift summary and any significant changes:     Patient was transferred from ED this afternoon to Oncology floor. Patient is NPO but is allowed to have ice chips and popsicles. She is resting comfortable and on continuous fluids. Concerns for physician to address:  none      Zone phone for oncoming shift:   [phone]       Activity:     Number times ambulated in hallways past shift: 0  Number of times OOB to chair past shift: 1    Cardiac:   Cardiac Monitoring: No      Cardiac Rhythm: Sinus Rhythm    Access:   Current line(s): PIV     Genitourinary:   Urinary status: voiding    Respiratory:   O2 Device: None (Room air)  Chronic home O2 use?: NO  Incentive spirometer at bedside: NO     GI:  Last Bowel Movement Date: 09/16/21  Current diet:  DIET NPO Ice Chips, Sips of Water with Meds, Popsicles, Sips of Clear Liquids  Passing flatus: YES  Tolerating current diet: YES       Pain Management:   Patient states pain is manageable on current regimen: YES    Skin:  Mikey Score: 20  Interventions: float heels and increase time out of bed    Patient Safety:  Fall Score:  Total Score: 1  Interventions: bed/chair alarm, assistive device (walker, cane, etc) and gripper socks       Length of Stay:  Expected LOS: - - -  Actual LOS: Brian Hightower RN

## 2021-09-16 NOTE — PROGRESS NOTES
Transition of Care Plan:    READMIT    RUR: TBD. Pt still in ER. Disposition: Plan is Home   MD/PT/OT eval - pending recommendation to see if HH needed at discharge. Insurance is Tuan800    Follow up appointments: PCP: Carlo Streeter: Appt scheduled from 9/13 DC: 9/20/21 at 10:30 AM  Surgery: Cross: Appt scheduled from 9/13 DC: 9/30/21 at 1 PM  DME needed: tbd  Transportation at Discharge: Best Friend: Dandre Shown. [place] or means to access home:  yes      IM Medicare Letter: n/a Pt has Tuan800  Is patient a BCPI-A Bundle:       n/a    If yes, was Bundle Letter given?:     Caregiver Contact: DTR: Metro Hu: [phone]  Mom: Radha Held: [phone]  Discharge Caregiver contacted prior to discharge? Pt is alert and oriented and communicating with family. Unit CM to communicate DC plan prior to DC. Date of previous inpatient admission/ ED visit? 9/10/21 to 3/61/75: Umbilical hernia with obstruction. S/p lap reduction/repair    What brought the patient back to ED?  9/15/21: s/p lap reduction/repair - Pt has ABD pain, worse with eating. Vomiting and Diarrhea. Did patient decline recommended services during last admission/ ED visit (if yes, what)? nothing    Has patient seen a provider since their last inpatient admission/ED visit (if yes, when)? No appt was scheduled with PCP for 9/20/21. CM Interventions:  From previous inpatient admission/ED visit:Home with follow up appt. PCP: 9/20/21 and Surgery: 9/30/21  From current inpatient admission/ED visit Plan to readmit      Reason for Readmission:     Pt was readmitted 9/16/21 d/t ABD pain, vomit/diarrhea s/p lap reduction/repair on 9/13/21.           RUR Score/Risk Level:     tbd    PCP: First and Last name:  Analisa Luz   Name of Practice:  [facility]   Are you a current patient: Yes/No: yes   Approximate date of last visit: 8/6/21   Can you participate in a virtual visit with your PCP: yes    Is a Care Conference indicated: n/a      Did you attend your follow up appointment (s): If not, why not:no. . readmitted before she could attend appt. Resources/supports as identified by patient/family:   [place] facing patient (as identified by patient/family and CM): Finances/Medication cost?     Pt has prescription drug coverage through [employer]. [facility]acy [facility] on [address]  Transportation      Pt drives but has supportive family that could help if needed. Support system or lack thereof? John Whittington, Mother, Lilli Temple, and Best FriendAscension Bob  Living arrangements? Lives alone in one story home with 4 ROBINSON. Family/Friends live closeby. Self-care/ADLs/Cognition? Alert and oriented. I W ADLs and IADLs. Drives  DME: Yariel Lis but does not use it. No HH or rehab experience. Advance Care Planning     General Advance Care Planning (ACP) Conversation    Date of Conversation: 09/16/21  Conducted with: Patient with Decision Making Capacity    Healthcare Decision Maker: DTR: Dianelys Vieira: [phone]  Mom: Bayron Dover: [phone]    Content/Action Overview:   DECLINED ACP conversation - will revisit periodically   Reviewed DNR/DNI and patient elects Full Code (Attempt Resuscitation)    Length of Voluntary ACP Conversation in minutes:  <16 minutes (Non-Billable)    [id number] Management Interventions  PCP Verified by CM: Yes  Palliative Care Criteria Met (RRAT>21 & CHF Dx)?: No  Mode of Transport at Discharge:  Other (see comment)  Transition of Care Consult (CM Consult): Discharge Planning  MyChart Signup: No  Discharge Durable Medical Equipment: No  Physical Therapy Consult: No  Occupational Therapy Consult: No  Speech Therapy Consult: No  Support Systems: Child(amy), Parent(s), Friend/Neighbor  Confirm Follow Up Transport: Self  Discharge Location  Discharge Placement: Home    Readmission Assessment  Number of days since last admission?: 1-7 days  Previous disposition: Home with Family  Who is being interviewed?: Patient  What was the patient's/caregiver's perception as to why they think they needed to return back to the hospital?: Other (Comment) (unable to eat.  ABD pain and N/V)  Did you visit your Primary Care Physician after you left the hospital, before you returned this time?: No  Why weren't you able to visit your PCP?: Other (Comment) (appt scheduled fro 9/20/21 and Pt returned prior to that date.)  Who advised the patient to return to the hospital?: Self-referral  Does the patient report anything that got in the way of taking their medications?: No  In our efforts to provide the best possible care to you and others like you, can you think of anything that we could have done to help you after you left the hospital the first time, so that you might not have needed to return so soon?: Other (Comment)    Yung Gonzalez "Jodie" 103

## 2021-09-16 NOTE — ED NOTES
Report received from Nelly Vela RN. They advised of the patient's chief complaint, current status, orders completed (to include IV access/medications/radiology testing), outstanding orders that still need to be completed, and the treatment plan. Questions asked and answered prior to assumption of care.

## 2021-09-16 NOTE — ED NOTES
Report given to Josseline Suazo RN. They were informed of patient chief complaint, current status, orders completed (to include IV access/medications/radiology testing), outstanding orders that still need to be completed, and the treatment plan. Ensured no questions or concerns regarding the patient prior to departure.

## 2021-09-17 LAB
ANION GAP SERPL CALC-SCNC: 4 MMOL/L (ref 5–15)
BUN SERPL-MCNC: 5 MG/DL (ref 6–20)
BUN/CREAT SERPL: 9 (ref 12–20)
CALCIUM SERPL-MCNC: 8.2 MG/DL (ref 8.5–10.1)
CHLORIDE SERPL-SCNC: 108 MMOL/L (ref 97–108)
CO2 SERPL-SCNC: 26 MMOL/L (ref 21–32)
CREAT SERPL-MCNC: 0.55 MG/DL (ref 0.55–1.02)
ERYTHROCYTE [DISTWIDTH] IN BLOOD BY AUTOMATED COUNT: 12 % (ref 11.5–14.5)
GLUCOSE SERPL-MCNC: 109 MG/DL (ref 65–100)
HCT VFR BLD AUTO: 37.3 % (ref 35–47)
HGB BLD-MCNC: 12.5 G/DL (ref 11.5–16)
MCH RBC QN AUTO: 33.7 PG (ref 26–34)
MCHC RBC AUTO-ENTMCNC: 33.5 G/DL (ref 30–36.5)
MCV RBC AUTO: 100.5 FL (ref 80–99)
NRBC # BLD: 0 K/UL (ref 0–0.01)
NRBC BLD-RTO: 0 PER 100 WBC
PLATELET # BLD AUTO: 288 K/UL (ref 150–400)
PMV BLD AUTO: 10.3 FL (ref 8.9–12.9)
POTASSIUM SERPL-SCNC: 3.4 MMOL/L (ref 3.5–5.1)
RBC # BLD AUTO: 3.71 M/UL (ref 3.8–5.2)
SODIUM SERPL-SCNC: 138 MMOL/L (ref 136–145)
WBC # BLD AUTO: 12.8 K/UL (ref 3.6–11)

## 2021-09-17 PROCEDURE — 74011250636 HC RX REV CODE- 250/636: Performed by: SURGERY

## 2021-09-17 PROCEDURE — 99218 HC RM OBSERVATION: CPT

## 2021-09-17 PROCEDURE — 96376 TX/PRO/DX INJ SAME DRUG ADON: CPT

## 2021-09-17 PROCEDURE — 80048 BASIC METABOLIC PNL TOTAL CA: CPT

## 2021-09-17 PROCEDURE — 85027 COMPLETE CBC AUTOMATED: CPT

## 2021-09-17 PROCEDURE — 36415 COLL VENOUS BLD VENIPUNCTURE: CPT

## 2021-09-17 PROCEDURE — 96372 THER/PROPH/DIAG INJ SC/IM: CPT

## 2021-09-17 PROCEDURE — 74011250637 HC RX REV CODE- 250/637: Performed by: SURGERY

## 2021-09-17 RX ORDER — ENOXAPARIN SODIUM 100 MG/ML
40 INJECTION SUBCUTANEOUS EVERY 24 HOURS
Status: DISCONTINUED | OUTPATIENT
Start: 2021-09-17 | End: 2021-09-18 | Stop reason: HOSPADM

## 2021-09-17 RX ADMIN — Medication 10 ML: at 19:04

## 2021-09-17 RX ADMIN — NALOXEGOL OXALATE 25 MG: 25 TABLET, FILM COATED ORAL at 08:57

## 2021-09-17 RX ADMIN — ENOXAPARIN SODIUM 40 MG: 40 INJECTION SUBCUTANEOUS at 09:53

## 2021-09-17 RX ADMIN — POTASSIUM CHLORIDE, DEXTROSE MONOHYDRATE AND SODIUM CHLORIDE 50 ML/HR: 150; 5; 450 INJECTION, SOLUTION INTRAVENOUS at 19:01

## 2021-09-17 RX ADMIN — POTASSIUM CHLORIDE, DEXTROSE MONOHYDRATE AND SODIUM CHLORIDE 125 ML/HR: 150; 5; 450 INJECTION, SOLUTION INTRAVENOUS at 04:55

## 2021-09-17 RX ADMIN — Medication 10 ML: at 15:16

## 2021-09-17 RX ADMIN — ONDANSETRON 4 MG: 2 INJECTION INTRAMUSCULAR; INTRAVENOUS at 09:53

## 2021-09-17 RX ADMIN — Medication 5 ML: at 06:29

## 2021-09-17 NOTE — DISCHARGE INSTRUCTIONS
Hernia Repair: What to Expect at [address] are likely to have pain for the next few days. You may also feel like you have the flu, and you may have a low fever and feel tired and nauseated. This is common. You should feel better after a few days and will probably feel much better in 7 days. For several weeks you may feel twinges or pulling in the hernia repair when you move. This care sheet gives you a general idea about how long it will take for you to recover. But each person recovers at a different pace. Follow the steps below to get better as quickly as possible. How can you care for yourself at home? Activity  · Rest when you feel tired. Getting enough sleep will help you recover. · Try to walk each day. Start by walking a little more than you did the day before. Bit by bit, increase the amount you walk. Walking boosts blood flow and helps prevent pneumonia and constipation. · Avoid strenuous activities, such as biking, jogging, weight lifting, or aerobic exercise, until your doctor says it is okay. · Avoid lifting anything that would make you strain. This may include heavy grocery bags and milk containers, a heavy briefcase or backpack, cat litter or dog food bags, a vacuum , or a child. · You may drive when you are no longer taking pain medicine and can quickly move your foot from the gas pedal to the brake. You must also be able to sit comfortably for a long period of time, even if you do not plan to go far. You might get caught in traffic. · Most people are able to return to work within 1 to 2 weeks after surgery. · You may shower 24 to 48 hours after surgery, if your doctor okays it. Pat the cut (incision) dry. Do not take a bath for the first 2 weeks, or until your doctor tells you it is okay. · Your doctor will tell you when you can have sex again. Diet  · You can eat your normal diet.  If your stomach is upset, try bland, low-fat foods like plain rice, broiled chicken, toast, and yogurt. · Drink plenty of fluids (unless your doctor tells you not to). · You may notice that your bowel movements are not regular right after your surgery. This is common. Avoid constipation and straining with bowel movements. You may want to take a fiber supplement every day. If you have not had a bowel movement after a couple of days, ask your doctor about taking a mild laxative. Medicines  · Be safe with medicines. Take pain medicines exactly as directed. ¨ If the doctor gave you a prescription medicine for pain, take it as prescribed. ¨ If you are not taking a prescription pain medicine, take an over-the-counter medicine such as acetaminophen (Tylenol), ibuprofen (Advil, Motrin), or naproxen (Aleve). Read and follow all instructions on the label. ¨ Do not take two or more pain medicines at the same time unless the doctor told you to. Many pain medicines have acetaminophen, which is Tylenol. Too much acetaminophen (Tylenol) can be harmful. · If your doctor prescribed antibiotics, take them as directed. Do not stop taking them just because you feel better. You need to take the full course of antibiotics. · If you think your pain medicine is making you sick to your stomach:  ¨ Take your medicine after meals (unless your doctor has told you not to). ¨ Ask your doctor for a different pain medicine. Incision care  · If you have strips of tape on the cut (incision) the doctor made, leave the tape on for a week or until it falls off. · If you have staples closing the cut, you will need to visit your doctor in 1 to 2 weeks to have them removed. · Wash the area daily with warm, soapy water and pat it dry. Follow-up care is a key part of your treatment and safety. Be sure to make and go to all appointments, and call your doctor if you are having problems. It's also a good idea to know your test results and keep a list of the medicines you take. When should you call for help?   Call [phone] anytime you think you may need emergency care. For example, call if:  · You passed out (lost consciousness). · You have sudden chest pain and shortness of breath, or you cough up blood. · You have severe pain in your belly. Call your doctor now or seek immediate medical care if:  · You are sick to your stomach and cannot keep fluids down. · You have signs of a blood clot, such as:  ¨ Pain in your calf, back of the knee, thigh, or groin. ¨ Redness and swelling in your leg or groin. · You have trouble passing urine or stool, especially if you have mild pain or swelling in your lower belly. · Bright red blood has soaked through the bandage over your incision. Watch closely for any changes in your health, and be sure to contact your doctor if:  · Your swelling is getting worse. · Your swelling is not going down. Where can you learn more? Go to [employer]  Enter G029 in the search box to learn more about \"Hernia Repair: What to Expect at Home. \"   © 7415-8527 Healthwise, Incorporated. Care instructions adapted under license by [employer] (which disclaims liability or warranty for this information). This care instruction is for use with your licensed healthcare professional. If you have questions about a medical condition or this instruction, always ask your healthcare professional. [place] [address] any warranty or liability for your use of this information.   Content Version: 42.2.400756; Current as of: November 14, 2014

## 2021-09-17 NOTE — PROGRESS NOTES
.End of Shift Note    Bedside shift change report given to Chelsey De Leon (oncoming nurse) by Jennifer Calix RN (offgoing nurse). Report included the following information SBAR, Procedure Summary, Intake/Output, MAR and Recent Results    Shift worked:  7 a- 7 p     Shift summary and any significant changes:     Patient was moved to clear liquid diet this am with intentions to advance diet forward if patient tolerates well. Concerns for physician to address:  none     Zone phone for oncoming shift:          Activity:  Activity Level: Up with Assistance  Number times ambulated in hallways past shift: 0  Number of times OOB to chair past shift: 4    Cardiac:   Cardiac Monitoring: No      Cardiac Rhythm: Sinus Rhythm    Access:   Current line(s): PIV     Genitourinary:   Urinary status: voiding    Respiratory:   O2 Device: None (Room air)  Chronic home O2 use?: NO  Incentive spirometer at bedside: NO     GI:  Last Bowel Movement Date: 09/16/21  Current diet:  ADULT DIET Full Liquid  ADULT DIET Regular  Passing flatus: YES  Tolerating current diet: YES       Pain Management:   Patient states pain is manageable on current regimen: YES    Skin:  Mikey Score: 19  Interventions: float heels and increase time out of bed    Patient Safety:  Fall Score:  Total Score: 2  Interventions: assistive device (walker, cane, etc), gripper socks and pt to call before getting OOB       Length of Stay:  Expected LOS: - - -  Actual LOS: Brian Hightower RN

## 2021-09-17 NOTE — PROGRESS NOTES
SURGERY PROGRESS NOTE      Admit Date: 9/15/2021      Subjective:     Patient feels much better. Denies pain and nausea. Passing flatus and had several loose BMs      Objective:     Visit Vitals  /71 (BP 1 Location: Left upper arm, BP Patient Position: At rest)   Pulse 78   Temp 97.9 °F (36.6 °C)   Resp 18   Ht 5' 5\" (1.651 m)   Wt 127.8 kg (281 lb 12 oz)   LMP 2011   SpO2 96%   Breastfeeding No   BMI 46.89 kg/m²        Temp (24hrs), Av °F (36.7 °C), Min:97.8 °F (36.6 °C), Max:98.2 °F (36.8 °C)      No intake/output data recorded. 09/15 1901 -  0700  In: 2500 [I.V.:2500]  Out: -     Physical Exam:    General:  alert, cooperative, no distress, appears stated age   Abdomen: soft, bowel sounds active, non-tender   Incision:   healing well, no drainage, no erythema, no hernia, no seroma, no swelling, no dehiscence, incision well approximated           Lab Results   Component Value Date/Time    WBC 12.8 (H) 2021 05:03 AM    HGB 12.5 2021 05:03 AM    HCT 37.3 2021 05:03 AM    PLATELET 889  05:03 AM    .5 (H) 2021 05:03 AM     Lab Results   Component Value Date/Time    GFR est non-AA >60 2021 05:03 AM    GFR est AA >60 2021 05:03 AM    Creatinine 0.55 2021 05:03 AM    BUN 5 (L) 2021 05:03 AM    Sodium 138 2021 05:03 AM    Potassium 3.4 (L) 2021 05:03 AM    Chloride 108 2021 05:03 AM    CO2 26 2021 05:03 AM       Assessment:     Active Problems:    Postoperative ileus ([address] (2021)    resolving    Plan:       Clear liquid diet, advance as tolerated.     D/C planning for tomorrow

## 2021-09-17 NOTE — PROGRESS NOTES
Problem: Falls - Risk of  Goal: *Absence of Falls  Description: Document Evi Rich Fall Risk and appropriate interventions in the flowsheet.   Outcome: Progressing Towards Goal  Note: Fall Risk Interventions:  Mobility Interventions: Patient to call before getting OOB         Medication Interventions: Assess postural VS orthostatic hypotension, Patient to call before getting OOB, Teach patient to arise slowly    Elimination Interventions: Call light in reach, Patient to call for help with toileting needs

## 2021-09-18 VITALS
TEMPERATURE: 97.8 F | BODY MASS INDEX: 46.94 KG/M2 | HEIGHT: 65 IN | OXYGEN SATURATION: 97 % | WEIGHT: 281.75 LBS | RESPIRATION RATE: 16 BRPM | SYSTOLIC BLOOD PRESSURE: 133 MMHG | DIASTOLIC BLOOD PRESSURE: 82 MMHG | HEART RATE: 73 BPM

## 2021-09-18 LAB
ANION GAP SERPL CALC-SCNC: 3 MMOL/L (ref 5–15)
BUN SERPL-MCNC: 3 MG/DL (ref 6–20)
BUN/CREAT SERPL: 6 (ref 12–20)
CALCIUM SERPL-MCNC: 8.3 MG/DL (ref 8.5–10.1)
CHLORIDE SERPL-SCNC: 108 MMOL/L (ref 97–108)
CO2 SERPL-SCNC: 28 MMOL/L (ref 21–32)
CREAT SERPL-MCNC: 0.5 MG/DL (ref 0.55–1.02)
ERYTHROCYTE [DISTWIDTH] IN BLOOD BY AUTOMATED COUNT: 12 % (ref 11.5–14.5)
GLUCOSE SERPL-MCNC: 101 MG/DL (ref 65–100)
HCT VFR BLD AUTO: 35.9 % (ref 35–47)
HGB BLD-MCNC: 12.1 G/DL (ref 11.5–16)
MCH RBC QN AUTO: 33.7 PG (ref 26–34)
MCHC RBC AUTO-ENTMCNC: 33.7 G/DL (ref 30–36.5)
MCV RBC AUTO: 100 FL (ref 80–99)
NRBC # BLD: 0 K/UL (ref 0–0.01)
NRBC BLD-RTO: 0 PER 100 WBC
PLATELET # BLD AUTO: 286 K/UL (ref 150–400)
PMV BLD AUTO: 10.1 FL (ref 8.9–12.9)
POTASSIUM SERPL-SCNC: 3.2 MMOL/L (ref 3.5–5.1)
RBC # BLD AUTO: 3.59 M/UL (ref 3.8–5.2)
SODIUM SERPL-SCNC: 139 MMOL/L (ref 136–145)
WBC # BLD AUTO: 13.3 K/UL (ref 3.6–11)

## 2021-09-18 PROCEDURE — 80048 BASIC METABOLIC PNL TOTAL CA: CPT

## 2021-09-18 PROCEDURE — 36415 COLL VENOUS BLD VENIPUNCTURE: CPT

## 2021-09-18 PROCEDURE — 85027 COMPLETE CBC AUTOMATED: CPT

## 2021-09-18 PROCEDURE — 74011250637 HC RX REV CODE- 250/637: Performed by: SURGERY

## 2021-09-18 PROCEDURE — 99218 HC RM OBSERVATION: CPT

## 2021-09-18 RX ADMIN — NALOXEGOL OXALATE 25 MG: 25 TABLET, FILM COATED ORAL at 10:02

## 2021-09-18 RX ADMIN — Medication 10 ML: at 03:23

## 2021-09-18 NOTE — PROGRESS NOTES
Transition of Care Plan:     READMIT     RUR: OBS: Oral and Written notification given to patient and/or caregiver informing them that they are currently an Outpatient receiving care in our facility. Outpatient services include Observation Services. Disposition: Plan is Home today      Insurance is [employer]     Follow up appointments: PCP: Kaur Tucker: Appt scheduled from 9/13 DC: 9/20/21 at 10:30 AM  Surgery: Cross: Appt scheduled from 9/13 DC: 9/30/21 at 1 PM  DME needed: tbd  Transportation at Discharge: DTR: Tello Tran will be transporting her home in car around [address] or means to access home:  yes      IM Medicare Letter: n/a Pt has [employer]  Is patient a BCPI-A Bundle:       n/a               If yes, was Bundle Letter given?:     Caregiver Contact: DTR: Dianelys Vieira: [phone]  Mom: Bayron Merrill: [phone]  Discharge Caregiver contacted prior to discharge? Pt is alert and oriented and communicating with DTR. Her DTR will be here around 10 AM.     No further CM needs. Care Management Interventions  PCP Verified by CM: Yes  Palliative Care Criteria Met (RRAT>21 & CHF Dx)?: No  Mode of Transport at Discharge:  Other (see comment)  Transition of Care Consult (CM Consult): Discharge Planning  MyChart Signup: No  Discharge Durable Medical Equipment: No  Physical Therapy Consult: No  Occupational Therapy Consult: No  Speech Therapy Consult: No  Support Systems: Child(amy), Friend/Neighbor  Confirm Follow Up Transport: Self  Discharge [facility]  [facility]   Ext [phone]

## 2021-09-18 NOTE — PROGRESS NOTES
End of shift report given to the oncoming RNRamesh from the outgoing RN, Chelsey De Leon. Report included the following information from SBAR, [facility], Kardex, and recent results. Pt rested comfortable throughout shift. Pt had no complaints of pain. Safety rounding complete.

## 2021-09-18 NOTE — PROGRESS NOTES
Problem: Falls - Risk of  Goal: *Absence of Falls  Description: Document Marilu Galan Fall Risk and appropriate interventions in the flowsheet.   Outcome: Progressing Towards Goal  Note: Fall Risk Interventions:  Mobility Interventions: Patient to call before getting OOB         Medication Interventions: Teach patient to arise slowly    Elimination Interventions: Call light in reach

## 2021-09-18 NOTE — DISCHARGE SUMMARY
Physician Discharge Summary     Patient ID:  Luda Santacruz  [id number]  38 y.o.  1970    Allergies: Other food    Admit Date: 9/15/2021    Discharge Date: 9/18/2021    * Admission Diagnoses: Postoperative ileus ([facility] [address] [K91.89, K56.7]    * Discharge Diagnoses:    Hospital Problems as of 9/18/2021 Date Reviewed: 9/11/2021        Codes Class Noted - Resolved POA    Postoperative ileus ([facility] [address] ICD-10-CM: K91.89, K56.7  ICD-9-CM: 997.49, 560.1  9/16/2021 - Present Unknown               Admission Condition: Poor    * Discharge Condition: good    * Procedures: * No surgery found [facility] Course:   Pt readmitted with postop ileus following umbilical hernia repair for obstruction. Pt improved with discontinuing narcotics and using Movantik. Now doing well with return of bowel function. Consults: None    Disposition: Home    Discharge Medications:   Current Discharge Medication List      START taking these medications    Details   naloxegoL (MOVANTIK) 25 mg tab tablet Take 1 Tablet by mouth Daily (before breakfast) for 7 days. Qty: 7 Tablet, Refills: 0  Start date: 9/18/2021, End date: 9/25/2021         CONTINUE these medications which have NOT CHANGED    Details   diclofenac EC (VOLTAREN) 75 mg EC tablet Take 1 Tablet by mouth two (2) times a day. As needed for hip  Qty: 60 Tablet, Refills: 1    Associated Diagnoses: Right hip pain             * Follow-up Care/Patient Instructions:   Activity: No heavy lifting for 3 weeks  Diet: Regular Diet  Wound Care: Keep wound clean and dry    Follow-up Information     Follow up With Specialties Details Why Contact Info    Anatoly Wheatley NP Nurse Practitioner   [address]  [phone]          Follow-up tests/labs none    Signed:  Macy Caba MD, FACS  9/18/2021  8:36 AM

## 2021-09-20 ENCOUNTER — VIRTUAL VISIT (OUTPATIENT)
Dept: FAMILY MEDICINE CLINIC | Age: 51
End: 2021-09-20
Payer: COMMERCIAL

## 2021-09-20 DIAGNOSIS — K91.89 POSTOPERATIVE ILEUS (HCC): ICD-10-CM

## 2021-09-20 DIAGNOSIS — Z09 HOSPITAL DISCHARGE FOLLOW-UP: Primary | ICD-10-CM

## 2021-09-20 DIAGNOSIS — K42.0 UMBILICAL HERNIA WITH OBSTRUCTION, WITHOUT GANGRENE: ICD-10-CM

## 2021-09-20 DIAGNOSIS — K56.7 POSTOPERATIVE ILEUS (HCC): ICD-10-CM

## 2021-09-20 PROCEDURE — 99213 OFFICE O/P EST LOW 20 MIN: CPT | Performed by: NURSE PRACTITIONER

## 2021-09-20 PROCEDURE — 1111F DSCHRG MED/CURRENT MED MERGE: CPT | Performed by: NURSE PRACTITIONER

## 2021-09-20 NOTE — PROGRESS NOTES
Virtual Video Transitional Care Management Progress Note    Patient: Marino Smith  : 1970  PCP: Janiya Lundberg NP    Date of office visit: 2021   Date of admission: 9/15/21  Date of discharge: 21  Hospital: [facility]    Call initiated w/i 2 business dates of discharge: *No response documented in the last 14 days   Date of the most recent call to the patient: *No documented post hospital discharge outreach found in the last 14 days        Assessment/Plan:   Diagnoses and all orders for this visit:    1. Hospital discharge follow-up  -     [place] DISCHARGE MEDS RECONCILED W/ CURRENT OUTPATIENT MED LIST    2. Umbilical hernia with obstruction, without gangrene - RESOLVED    3. Postoperative ileus ([address] - RESOLVED    Has appt with general surgery on 21     Subjective:   Marino Smith is a 54 y.o. female presenting today for follow-up after hospital discharge. This encounter and supporting documentation was reviewed if available. Medication reconciliation was performed today. The main problem requiring admission was incarcerated umbilical hernia causing SBO. Complications during admission: none     incarcerated umbilical hernia causing SBOProcedures: Procedure(s):  LAPAROSCOPIC  UMBILICAL HERNIA REPAIR     Consults: general surgery           Hospital Course:   9/10/21 - 21 - Patient admitted with vomiting, abd pain, diarrhea.  with incarcerated incisional hernia causing SBO. Underwent uneventful reduction and repair of the hernia. Obstruction resolved. On discharge, patient pain is controlled with PO medication. She is ambulating, and tolerating lizbeth diet. 9/15/21 - 21 Pt readmitted with vomting, abd pain, dx with postop ileus following umbilical hernia repair for obstruction. Pt improved with discontinuing narcotics and using Movantik. Now doing well with return of bowel function. Not taking anything for pain. Just sore from surgery.   Will have some pain when she needs to have BM    Tolerating all foods and fluids. Cannot eat as much, gets full faster. .   Interval history/Current status:     Patient states symptoms have improved. She is not using oxycodone for pain. Not taking Movantik. Advised she can use Tylenol for pain. Tolerating PO, states incisions look good, no redness or drainage. dermabond is gradually peeling. She is having flatus/BMs    Admitting symptoms have: improved      Medications marked \"taking\" at this time:  Home Medications    Medication Sig Start Date End Date Taking? Authorizing Provider   naloxegoL (MOVANTIK) 25 mg tab tablet Take 1 Tablet by mouth Daily (before breakfast) for 7 days. 21 Yes Ron Koenig NP   diclofenac EC (VOLTAREN) 75 mg EC tablet Take 1 Tablet by mouth two (2) times a day. As needed for hip 21  Yes Susan Daniels DO        Review of Systems:  Review of Systems   Constitutional: Negative. Respiratory: Negative. Cardiovascular: Negative. Gastrointestinal: Negative. Genitourinary: Negative. Neurological: Negative. Patient Active Problem List   Diagnosis Code    Obesity, morbid ([facility] [address] E66.01    Bilateral primary osteoarthritis of hip K65.6    Umbilical hernia with obstruction, without gangrene B90.7    Umbilical hernia with obstruction K42.0    Postoperative ileus (HCC) K91.89, K56.7     Current Outpatient Medications   Medication Sig Dispense Refill    naloxegoL (MOVANTIK) 25 mg tab tablet Take 1 Tablet by mouth Daily (before breakfast) for 7 days. 7 Tablet 0    diclofenac EC (VOLTAREN) 75 mg EC tablet Take 1 Tablet by mouth two (2) times a day.  As needed for hip 60 Tablet 1     Allergies   Allergen Reactions    Other Food Swelling     Allergy to jicama root     Past Medical History:   Diagnosis Date    Spinal stenosis 2016     Past Surgical History:   Procedure Laterality Date    HX  SECTION      x2    HX GYN      HX HERNIA REPAIR 09/11/2021    HX LAPAROSCOPIC SUPRACERVICAL HYSTERECTOMY  2013    HX OTHER SURGICAL      jaw surgery    HX TUBAL LIGATION       Family History   Problem Relation Age of Onset    Breast Cancer Maternal Aunt         age unknown     Social History     Tobacco Use    Smoking status: Never Smoker    Smokeless tobacco: Never Used   Substance Use Topics    Alcohol use: Yes     Comment: occasionally         Objective:   No flowsheet data found. General: alert, cooperative, no distress   Mental  status: normal mood, behavior, speech, dress, motor activity, and thought processes, able to follow commands   HENT: NCAT   Neck: no visualized mass   Resp: no respiratory distress   Neuro: no gross deficits   Skin: no discoloration or lesions of concern on visible areas   Psychiatric: normal affect, consistent with stated mood, no evidence of hallucinations     Additional exam findings: We discussed the expected course, resolution and complications of the diagnosis(es) in detail. Medication risks, benefits, costs, interactions, and alternatives were discussed as indicated. I advised her to contact the office if her condition worsens, changes or fails to improve as anticipated. She expressed understanding with the diagnosis(es) and plan. Haleigh Blankenship, who was evaluated through a synchronous (real-time) audio-video encounter and/or her healthcare decision maker, is aware that it is a billable service, with coverage as determined by her insurance carrier. She provided verbal consent to proceed: Yes, and patient identification was verified. It was conducted pursuant to the emergency declaration under the [facility], [address] [place] [address] authority and the [employer] Act. A caregiver was present when appropriate. Ability to conduct physical exam was limited. I was at home. The patient was at home.       Peter Pastor NP    Total visit time spent in direct patient care/contact:  20-29 minutes  Greater than 50% of visit time was spent counseling and coordinating care.

## 2021-09-20 NOTE — PROGRESS NOTES
Chief Complaint   Patient presents with   [facility] Follow Up       1. Have you been to the ER, urgent care clinic since your last visit? Hospitalized since your last visit? Yes When: ED [facility] 9/10/21 hernia repair, 9/15-9/18/21 postoperative ileus     2. Have you seen or consulted any other health care providers outside of the [address] [place] [address] since your last visit? Include any pap smears or colon screening.  No

## 2021-09-27 NOTE — PROGRESS NOTES
Patient is a 37-year-old male, status post September 11, 2021 laparoscopic ventral umbilical hernia repair with mesh by Dr. Opal Ennis: Stanislav Bojorquez is a 54 y.o. female is seen for a routine postop check. Reports no problems with the wound or other issues. Activity, diet and bowels are normal.     OBJECTIVE: Appears well. Neuro C A and O x3  CV-  Reg rate  Lungs:  CTA  Abd  soft  NT  Wounds are well healed without complications or infection. ASSESSMENT: normal postdischarge course, doing well. Patient is a  and needs another week or so to recover before she can drive safely still has some abdominal discomfort around the hernia repair    PLAN: Patient is instructed to ad reginaldo activity . Return PRN for any problems or concerns.   Work note given to return to work October 11, 2021, patient will follow up as needed, patient overall pleased with results

## 2021-09-30 ENCOUNTER — OFFICE VISIT (OUTPATIENT)
Dept: SURGERY | Age: 51
End: 2021-09-30
Payer: COMMERCIAL

## 2021-09-30 VITALS
DIASTOLIC BLOOD PRESSURE: 87 MMHG | RESPIRATION RATE: 18 BRPM | HEIGHT: 65 IN | OXYGEN SATURATION: 97 % | HEART RATE: 95 BPM | WEIGHT: 275 LBS | BODY MASS INDEX: 45.82 KG/M2 | SYSTOLIC BLOOD PRESSURE: 132 MMHG

## 2021-09-30 DIAGNOSIS — K42.0 UMBILICAL HERNIA WITH OBSTRUCTION: Primary | ICD-10-CM

## 2021-09-30 PROCEDURE — 99024 POSTOP FOLLOW-UP VISIT: CPT | Performed by: SURGERY

## 2021-09-30 NOTE — PROGRESS NOTES
Chief Complaint   Patient presents with    Surgical Follow-up     2w f/u, lap umbilical hernia repair 5/85/27       1. Have you been to the ER, urgent care clinic since your last visit? Hospitalized since your last visit? ER, [facility] abdominal pain, blockage. 2. Have you seen or consulted any other health care providers outside of the [address] [place] [address] since your last visit? Include any pap smears or colon screening. No    Pt c/o abdominal pain and burning.

## 2021-10-15 ENCOUNTER — OFFICE VISIT (OUTPATIENT)
Dept: ORTHOPEDIC SURGERY | Age: 51
End: 2021-10-15
Payer: COMMERCIAL

## 2021-10-15 VITALS
BODY MASS INDEX: 45.48 KG/M2 | HEIGHT: 65 IN | HEART RATE: 104 BPM | OXYGEN SATURATION: 97 % | SYSTOLIC BLOOD PRESSURE: 110 MMHG | TEMPERATURE: 97 F | WEIGHT: 273 LBS | DIASTOLIC BLOOD PRESSURE: 77 MMHG

## 2021-10-15 DIAGNOSIS — E66.01 CLASS 3 SEVERE OBESITY WITH BODY MASS INDEX (BMI) OF 45.0 TO 49.9 IN ADULT, UNSPECIFIED OBESITY TYPE, UNSPECIFIED WHETHER SERIOUS COMORBIDITY PRESENT (HCC): ICD-10-CM

## 2021-10-15 DIAGNOSIS — M16.0 BILATERAL PRIMARY OSTEOARTHRITIS OF HIP: Primary | ICD-10-CM

## 2021-10-15 DIAGNOSIS — M25.551 RIGHT HIP PAIN: ICD-10-CM

## 2021-10-15 PROCEDURE — 99213 OFFICE O/P EST LOW 20 MIN: CPT | Performed by: ORTHOPAEDIC SURGERY

## 2021-10-15 PROCEDURE — 20611 DRAIN/INJ JOINT/BURSA W/US: CPT | Performed by: ORTHOPAEDIC SURGERY

## 2021-10-15 RX ORDER — BETAMETHASONE SODIUM PHOSPHATE AND BETAMETHASONE ACETATE 3; 3 MG/ML; MG/ML
6 INJECTION, SUSPENSION INTRA-ARTICULAR; INTRALESIONAL; INTRAMUSCULAR; SOFT TISSUE ONCE
Status: COMPLETED | OUTPATIENT
Start: 2021-10-15 | End: 2021-10-15

## 2021-10-15 RX ADMIN — BETAMETHASONE SODIUM PHOSPHATE AND BETAMETHASONE ACETATE 6 MG: 3; 3 INJECTION, SUSPENSION INTRA-ARTICULAR; INTRALESIONAL; INTRAMUSCULAR; SOFT TISSUE at 15:17

## 2021-10-15 NOTE — PROGRESS NOTES
Identified pt with two pt identifiers (name and ). Reviewed chart in preparation for visit and have obtained necessary documentation. Cristina Herrera is a 54 y.o. female  Chief Complaint   Patient presents with    Hip Pain     Bilateral hip     Visit Vitals  /77 (BP 1 Location: Right arm, BP Patient Position: Sitting, BP Cuff Size: Large adult)   Pulse (!) 104   Temp 97 °F (36.1 °C) (Tympanic)   Ht 5' 5\" (1.651 m)   Wt 273 lb (123.8 kg)   LMP 2011   SpO2 97%   BMI 45.43 kg/m²     1. Have you been to the ER, urgent care clinic since your last visit? Hospitalized since your last visit? No    2. Have you seen or consulted any other health care providers outside of the [address] [place] [address] since your last visit? Include any pap smears or colon screening.  No

## 2021-10-15 NOTE — PROGRESS NOTES
10/15/2021      CC: bilateral hip pain    HPI:      This is a 54y.o. year old female who presents for a follow up visit. The patient was last seen and diagnosed with bilateral hip OA. The patient's treatments since the most recent visit have comprised of weight loss attempts, injections in the past.   The patient has had no relief of the chief complaint. PMH:  Past Medical History:   Diagnosis Date    Spinal stenosis        PSxHx:  Past Surgical History:   Procedure Laterality Date    HX  SECTION      x2    HX GYN      HX HERNIA REPAIR  2021    Dr. Nikkie Ardon      HX OTHER SURGICAL      jaw surgery    HX TUBAL LIGATION         Meds:    Current Outpatient Medications:     diclofenac EC (VOLTAREN) 75 mg EC tablet, Take 1 Tablet by mouth two (2) times a day. As needed for hip, Disp: 60 Tablet, Rfl: 1  No current facility-administered medications for this visit. All:  Allergies   Allergen Reactions    Other Food Swelling     Allergy to jicama root       Social Hx:  Social History     Socioeconomic History    Marital status: SINGLE     Spouse name: Not on file    Number of children: Not on file    Years of education: Not on file    Highest education level: Not on file   Tobacco Use    Smoking status: Never Smoker    Smokeless tobacco: Never Used   Substance and Sexual Activity    Alcohol use: Yes     Comment: occasionally    Drug use: No    Sexual activity: Yes     Partners: Male     Birth control/protection: Surgical     Social Determinants of Health     Financial Resource Strain:     Difficulty of Paying Living Expenses:    Food Insecurity:     Worried About Running Out of Food in the Last Year:     [address] in the Last Year:    Transportation Needs:     Lack of Transportation (Medical):      Lack of Transportation (Non-Medical):    Physical Activity:     Days of Exercise per Week:     Minutes of Exercise per Session:    Stress:     Feeling of Stress :    Social Connections:     Frequency of Communication with Friends and Family:     Frequency of Social Gatherings with Friends and Family:     Attends Denominational Services:     Active Member of Clubs or Organizations:     Attends Club or Organization Meetings:     Marital Status:        Family Hx:  Family History   Problem Relation Age of Onset    Breast Cancer Maternal Aunt         age unknown         Review of Systems:       General: Denies headache, lethargy, fever, weight loss  Ears/Nose/Throat: Denies ear discharge, drainage, nosebleeds, hoarse voice, dental problems  Cardiovascular: Denies chest pain, shortness of breath  Lungs: Denies chest pain, breathing problems, wheezing, pneumonia  Stomach: Denies stomach pain, heartburn, constipation, irritable bowel  Skin: Denies rash, sores, open wounds  Musculoskeletal: bilateral hip pain  Genitourinary: Denies dysuria, hematuria, polyuria  Gastrointestinal: Denies constipation, obstipation, diarrhea  Neurological: Denies changes in sight, smell, hearing, taste, seizures. Denies loss of consciousness.   Psychiatric: Denies depression, sleep pattern changes, anxiety, change in personality  Endocrine: Denies mood swings, heat or cold intolerance  Hematologic/Lymphatic: Denies anemia, purpura, petechia  Allergic/Immunologic: Denies swelling of throat, pain or swelling at lymph nodes      Physical Examination:    Visit Vitals  /77 (BP 1 Location: Right arm, BP Patient Position: Sitting, BP Cuff Size: Large adult)   Pulse (!) 104   Temp 97 °F (36.1 °C) (Tympanic)   Ht 5' 5\" (1.651 m)   Wt 273 lb (123.8 kg)   SpO2 97%   BMI 45.43 kg/m²        General: AOX3, no apparent distress  Psychiatric: mood and affect appropriate  Lungs: breathing is symmetric and unlabored bilaterally  Heart: regular rate and rhythm  Abdomen: no guarding  Head: normocephalic, atraumatic  Skin: No significant abnormalities, good turgor  Sensation intact to light touch: C5-T1 dermatomes  Muscular exam: 5/5 strength in all major muscle groups unless noted in specialty exam.    Extremities        Right lower extremity: No deformity is noted. Circumduction of the hip causes significant pain in the groin, reproductive of the chief complaint. Range of motion is limited, with a positive impingement sign. Gait is antalgic. Sensation is intact to light touch in the L1-S1 dermatomes. Skin is intact about the hip. Hip flexion and abduction strength is 4+/5, hip extension and knee extension as well as tibialis anterior and EHL/GCS are 5/5 strength. Left lower extremity:  No deformity is noted. Circumduction of the hip causes significant pain in the groin, reproductive of the chief complaint. Range of motion is limited, with a positive impingement sign. Gait is antalgic. Sensation is intact to light touch in the L1-S1 dermatomes. Skin is intact about the hip. Hip flexion and abduction strength is 4+/5, hip extension and knee extension as well as tibialis anterior and EHL/GCS are 5/5 strength. Diagnostics:    Pertinent Diagnostics: none today    Assessment: Bilateral hip OA  Plan: This patient has the above mentioned issue. We have been working on weight loss in the past, and she has had some difficulty with this in the past.  She continues to work on that, but her pain in her hips limits her. In general, she needs to be 240 lbs to have surgery, which we discussed. In the meantime, she'd like to proceed with injections. She will follow up in 2-3 months as her weight diminishes. She stated her understanding and satisfaction. PROCEDURE NOTE:    After consent was obtained, the right hip was visualized under direct ultrasound guidance, utilizing a Sonosite C1-4 probe with 3-5 Hz variable frequency oriented in a longitudinal orientation.   Once I had confirmation of direct visualization of the head neck junction, skin at the anterior lateral hip was prepped with chlorhexidine. Under direct visualization, 1% lidocaine was injected into the subcutaneous tissues as well as into the capsule of the hip. Needle remained in place 6 mg of betamethasone as well as 1% lidocaine were injected into the hip joint itself, there was good filling of the capsule itself as noted by direct visualization. Images were saved to the local SonExterityte machine harddrive. Once this was completed, the needle was extracted, sterile dressing was applied. The patient tolerated well. Postinjection instructions were given. PROCEDURE NOTE:    After consent was obtained, the left hip was visualized under direct ultrasound guidance, utilizing a Sonosite C1-4 probe with 3-5 Hz variable frequency oriented in a longitudinal orientation. Once I had confirmation of direct visualization of the head neck junction, skin at the anterior lateral hip was prepped with chlorhexidine. Under direct visualization, 1% lidocaine was injected into the subcutaneous tissues as well as into the capsule of the hip. Needle remained in place 6 mg of betamethasone as well as 1% lidocaine were injected into the hip joint itself, there was good filling of the capsule itself as noted by direct visualization. Images were saved to the SonExterityte machine local drive. Once this was completed, the needle was extracted, sterile dressing was applied. The patient tolerated well. Postinjection instructions were given. Ms. Monica Brewster has a reminder for a \"due or due soon\" health maintenance. I have asked that she contact her primary care provider for follow-up on this health maintenance.

## 2021-10-15 NOTE — LETTER
10/15/2021    Patient: Alvin Brenner   YOB: 1970   Date of Visit: 10/15/2021     Ranjeet Valles NP  [address] [place] [address]  Via In Basket    Dear Ranjeet Valles NP,      Thank you for referring Ms. Suzanne Nazario to [facility] for evaluation. My notes for this consultation are attached. If you have questions, please do not hesitate to call me. I look forward to following your patient along with you.       Sincerely,    Giancarlo Mendez, DO

## 2021-11-22 DIAGNOSIS — M25.551 RIGHT HIP PAIN: ICD-10-CM

## 2021-11-22 DIAGNOSIS — M16.0 BILATERAL PRIMARY OSTEOARTHRITIS OF HIP: ICD-10-CM

## 2021-11-22 RX ORDER — DICLOFENAC SODIUM 75 MG/1
75 TABLET, DELAYED RELEASE ORAL 2 TIMES DAILY
Qty: 60 TABLET | Refills: 1 | Status: SHIPPED | OUTPATIENT
Start: 2021-11-22 | End: 2022-02-02 | Stop reason: SDUPTHER

## 2021-11-22 RX ORDER — HYDROCODONE BITARTRATE AND ACETAMINOPHEN 5; 325 MG/1; MG/1
1 TABLET ORAL
Qty: 42 TABLET | Refills: 0 | Status: SHIPPED | OUTPATIENT
Start: 2021-11-22 | End: 2021-11-29

## 2022-01-10 ENCOUNTER — VIRTUAL VISIT (OUTPATIENT)
Dept: FAMILY MEDICINE CLINIC | Age: 52
End: 2022-01-10
Payer: COMMERCIAL

## 2022-01-10 DIAGNOSIS — E66.01 OBESITY, MORBID (HCC): Primary | ICD-10-CM

## 2022-01-10 DIAGNOSIS — M16.0 BILATERAL PRIMARY OSTEOARTHRITIS OF HIP: ICD-10-CM

## 2022-01-10 PROCEDURE — 99214 OFFICE O/P EST MOD 30 MIN: CPT | Performed by: NURSE PRACTITIONER

## 2022-01-10 RX ORDER — PHENTERMINE HYDROCHLORIDE 37.5 MG/1
37.5 TABLET ORAL
Qty: 30 TABLET | Refills: 1 | Status: SHIPPED | OUTPATIENT
Start: 2022-01-10

## 2022-01-10 RX ORDER — HYDROCODONE BITARTRATE AND ACETAMINOPHEN 5; 325 MG/1; MG/1
1 TABLET ORAL
COMMUNITY
End: 2022-02-02 | Stop reason: SDUPTHER

## 2022-01-10 NOTE — PROGRESS NOTES
Chief Complaint   Patient presents with    Weight Management     Patient-Reported Vitals 1/10/2022   Patient-Reported Weight 264 lb        1. Have you been to the ER, urgent care clinic since your last visit? Hospitalized since your last visit? No    2. Have you seen or consulted any other health care providers outside of the [address] [place] [address] since your last visit? Include any pap smears or colon screening.  No     Flu shot - Pt declined     Health Maintenance Due   Topic Date Due    Hepatitis C Screening  Never done    DTaP/Tdap/Td series (1 - Tdap) 01/24/2006    Colorectal Cancer Screening Combo  Never done    Flu Vaccine (1) 09/01/2021    COVID-19 Vaccine (3 - Booster for Pfizer series) 10/08/2021    Shingrix Vaccine Age 50> (1 of 2) Never done    Breast Cancer Screen Mammogram  01/22/2022

## 2022-01-10 NOTE — PROGRESS NOTES
Yazmin Junior (: 1970) is a 50 y.o. female, established patient, here for evaluation of the following chief complaint(s):   Weight Management       ASSESSMENT/PLAN:  Below is the assessment and plan developed based on review of pertinent history, labs, studies, and medications. 1. Obesity, morbid (HCC)  -     phentermine (ADIPEX-P) 37.5 mg tablet; Take 1 Tablet by mouth every morning. Max Daily Amount: 37.5 mg., Normal, Disp-30 Tablet, R-1  -     - Provided nutritional and dietary counseling. Encouraged patient to drink water, avoid Liquid calories. Encouraged patient to make healthy dietary changes, encouraged patient to increase fruits/veggies, avoidance of fast foods, fried foods.  Encouraged patient to follow low carbohydrate diet,         - Discussed behavior modification. Discussed with patient to make small, achievable changes for better success with weight loss.         - Encouraged patient to increase exercise (recumbent bike, chair exercises).    -Patient to make small changes in diet and behavior, and to increase exercise. 2. Bilateral primary osteoarthritis of hip - per ortho      Return in about 6 weeks (around 2022). SUBJECTIVE/OBJECTIVE:  HPI  Patient is seen virtually for obesity  She is struggling losing weight. She has lost 15 pounds. Was doing keto for 6 weeks before [place]. States she came off diet during tia and her birthday. When she limits carb intake, does not have much appetite. Would have to force self to eat. Was doing intermittent fasting. Would not eat until 12 or 1p, and not eat after 7p  Cannot exercise because she can barely walk due to hip arthritis. Has recumbent bike she plans on using. Interested in weight loss medication.    Allergies   Allergen Reactions    Other Food Swelling     Allergy to jicama root       Past Medical History:   Diagnosis Date    Spinal stenosis        Past Surgical History:   Procedure Laterality Date    HX  SECTION      x2    HX GYN      HX HERNIA REPAIR  2021    Dr. Leandro Oliveros  2013    HX OTHER SURGICAL      jaw surgery    HX TUBAL LIGATION         Social History     Socioeconomic History    Marital status: SINGLE     Spouse name: Not on file    Number of children: Not on file    Years of education: Not on file    Highest education level: Not on file   Occupational History    Not on file   Tobacco Use    Smoking status: Never Smoker    Smokeless tobacco: Never Used   Vaping Use    Vaping Use: Never used   Substance and Sexual Activity    Alcohol use: Yes     Comment: occasionally    Drug use: No    Sexual activity: Yes     Partners: Male     Birth control/protection: Surgical   Other Topics Concern    Not on file   Social History Narrative    Not on file     Social Determinants of Health     Financial Resource Strain:     Difficulty of Paying Living Expenses: Not on file   Food Insecurity:     Worried About Running Out of Food in the Last Year: Not on file    [place] of Food in the Last Year: Not on file   Transportation Needs:     Lack of Transportation (Medical): Not on file    Lack of Transportation (Non-Medical):  Not on file   Physical Activity:     Days of Exercise per Week: Not on file    Minutes of Exercise per Session: Not on file   Stress:     Feeling of Stress : Not on file   Social Connections:     Frequency of Communication with Friends and Family: Not on file    Frequency of Social Gatherings with Friends and Family: Not on file    Attends Shinto Services: Not on file    Active Member of Clubs or Organizations: Not on file    Attends Club or Organization Meetings: Not on file    Marital Status: Not on file   Intimate Partner Violence:     Fear of Current or Ex-Partner: Not on file    Emotionally Abused: Not on file    Physically Abused: Not on file    Sexually Abused: Not on file   Housing Stability:     Unable to Pay for Housing in the Last Year: Not on file    Number of Places Lived in the Last Year: Not on file    Unstable Housing in the Last Year: Not on file       Family History   Problem Relation Age of Onset    Breast Cancer Maternal Aunt         age unknown       Current Outpatient Medications   Medication Sig    HYDROcodone-acetaminophen (Norco) 5-325 mg per tablet Take 1 Tablet by mouth every eight (8) hours as needed for Pain.  phentermine (ADIPEX-P) 37.5 mg tablet Take 1 Tablet by mouth every morning. Max Daily Amount: 37.5 mg.    diclofenac EC (VOLTAREN) 75 mg EC tablet Take 1 Tablet by mouth two (2) times a day. As needed for hip     No current facility-administered medications for this visit. Review of Systems   Constitutional: Negative. Respiratory: Negative. Cardiovascular: Negative. Gastrointestinal: Negative. Genitourinary: Negative. Musculoskeletal: Positive for arthralgias (hip pain). Neurological: Negative.        Patient-Reported Weight: 264 lb     Physical Exam  Constitutional: [x] Appears well-developed and well-nourished [x] No apparent distress      Mental status: [x] Alert and awake  [x] Oriented to person/place/time [x] Able to follow commands      Neck: [x] No visualized mass    Pulmonary/Chest: [x] Respiratory effort normal   [x] No visualized signs of difficulty breathing or respiratory distresS     Musculoskeletal:   [x] Normal range of motion of neck    Neurological:        [x] No Facial Asymmetry (Cranial nerve 7 motor function) (limited exam due to video visit)           Skin:        [x] No significant exanthematous lesions or discoloration noted on facial skin            Psychiatric:       [x] Normal Affect       [x] No Hallucinations      On this date 01/10/2022 I have spent 30 minutes reviewing previous notes, test results and face to face (virtual) with the patient discussing the diagnosis and importance of compliance with the treatment plan as well as documenting on the day of the visit. Rogersjerrod Kylie, was evaluated through a synchronous (real-time) audio-video encounter. The patient (or guardian if applicable) is aware that this is a billable service. Verbal consent to proceed has been obtained within the past 12 months. The visit was conducted pursuant to the emergency declaration under the [address] [place] [address], [address] [place] [address] and the [employer] Act. Patient identification was verified, and a caregiver was present when appropriate. The patient was located in a state where the provider was credentialed to provide care. An electronic signature was used to authenticate this note.   -- Aislinn Hubbard NP

## 2022-02-02 DIAGNOSIS — M16.0 BILATERAL PRIMARY OSTEOARTHRITIS OF HIP: Primary | ICD-10-CM

## 2022-02-02 DIAGNOSIS — M25.551 RIGHT HIP PAIN: ICD-10-CM

## 2022-02-02 RX ORDER — HYDROCODONE BITARTRATE AND ACETAMINOPHEN 5; 325 MG/1; MG/1
1 TABLET ORAL
Qty: 21 TABLET | Refills: 0 | Status: SHIPPED | OUTPATIENT
Start: 2022-02-02 | End: 2022-03-21 | Stop reason: SDUPTHER

## 2022-02-02 RX ORDER — DICLOFENAC SODIUM 75 MG/1
75 TABLET, DELAYED RELEASE ORAL 2 TIMES DAILY
Qty: 60 TABLET | Refills: 1 | Status: SHIPPED | OUTPATIENT
Start: 2022-02-02 | End: 2022-04-29 | Stop reason: SDUPTHER

## 2022-03-18 PROBLEM — K42.0 UMBILICAL HERNIA WITH OBSTRUCTION, WITHOUT GANGRENE: Status: ACTIVE | Noted: 2021-09-11

## 2022-03-18 PROBLEM — M16.0 BILATERAL PRIMARY OSTEOARTHRITIS OF HIP: Status: ACTIVE | Noted: 2020-01-15

## 2022-03-20 PROBLEM — E66.01 OBESITY, MORBID (HCC): Status: ACTIVE | Noted: 2020-01-10

## 2022-03-20 PROBLEM — K91.89 POSTOPERATIVE ILEUS (HCC): Status: ACTIVE | Noted: 2021-09-16

## 2022-03-20 PROBLEM — K56.7 POSTOPERATIVE ILEUS (HCC): Status: ACTIVE | Noted: 2021-09-16

## 2022-03-20 PROBLEM — K42.0 UMBILICAL HERNIA WITH OBSTRUCTION: Status: ACTIVE | Noted: 2021-09-11

## 2022-03-21 DIAGNOSIS — M25.551 RIGHT HIP PAIN: ICD-10-CM

## 2022-03-21 DIAGNOSIS — M16.0 BILATERAL PRIMARY OSTEOARTHRITIS OF HIP: ICD-10-CM

## 2022-03-21 RX ORDER — HYDROCODONE BITARTRATE AND ACETAMINOPHEN 5; 325 MG/1; MG/1
1 TABLET ORAL
Qty: 21 TABLET | Refills: 0 | Status: SHIPPED | OUTPATIENT
Start: 2022-03-21 | End: 2022-06-08 | Stop reason: SDUPTHER

## 2022-04-22 VITALS
HEIGHT: 65 IN | HEART RATE: 98 BPM | TEMPERATURE: 97.3 F | OXYGEN SATURATION: 98 % | DIASTOLIC BLOOD PRESSURE: 87 MMHG | BODY MASS INDEX: 44.55 KG/M2 | WEIGHT: 267.42 LBS | RESPIRATION RATE: 18 BRPM | SYSTOLIC BLOOD PRESSURE: 122 MMHG

## 2022-04-22 LAB
APPEARANCE UR: ABNORMAL
BACTERIA URNS QL MICRO: ABNORMAL /HPF
BASOPHILS # BLD: 0.1 K/UL (ref 0–0.1)
BASOPHILS NFR BLD: 1 % (ref 0–1)
BILIRUB UR QL: NEGATIVE
COLOR UR: ABNORMAL
DIFFERENTIAL METHOD BLD: ABNORMAL
EOSINOPHIL # BLD: 0.4 K/UL (ref 0–0.4)
EOSINOPHIL NFR BLD: 3 % (ref 0–7)
EPITH CASTS URNS QL MICRO: ABNORMAL /LPF
ERYTHROCYTE [DISTWIDTH] IN BLOOD BY AUTOMATED COUNT: 12.6 % (ref 11.5–14.5)
GLUCOSE UR STRIP.AUTO-MCNC: NEGATIVE MG/DL
HCT VFR BLD AUTO: 41.6 % (ref 35–47)
HGB BLD-MCNC: 13.9 G/DL (ref 11.5–16)
HGB UR QL STRIP: ABNORMAL
HYALINE CASTS URNS QL MICRO: ABNORMAL /LPF (ref 0–5)
IMM GRANULOCYTES # BLD AUTO: 0 K/UL (ref 0–0.04)
IMM GRANULOCYTES NFR BLD AUTO: 0 % (ref 0–0.5)
KETONES UR QL STRIP.AUTO: NEGATIVE MG/DL
LEUKOCYTE ESTERASE UR QL STRIP.AUTO: ABNORMAL
LYMPHOCYTES # BLD: 3.3 K/UL (ref 0.8–3.5)
LYMPHOCYTES NFR BLD: 28 % (ref 12–49)
MCH RBC QN AUTO: 33.3 PG (ref 26–34)
MCHC RBC AUTO-ENTMCNC: 33.4 G/DL (ref 30–36.5)
MCV RBC AUTO: 99.8 FL (ref 80–99)
MONOCYTES # BLD: 0.8 K/UL (ref 0–1)
MONOCYTES NFR BLD: 6 % (ref 5–13)
NEUTS SEG # BLD: 7.4 K/UL (ref 1.8–8)
NEUTS SEG NFR BLD: 62 % (ref 32–75)
NITRITE UR QL STRIP.AUTO: NEGATIVE
NRBC # BLD: 0 K/UL (ref 0–0.01)
NRBC BLD-RTO: 0 PER 100 WBC
PH UR STRIP: 5 [PH] (ref 5–8)
PLATELET # BLD AUTO: 278 K/UL (ref 150–400)
PMV BLD AUTO: 10.6 FL (ref 8.9–12.9)
PROT UR STRIP-MCNC: NEGATIVE MG/DL
RBC # BLD AUTO: 4.17 M/UL (ref 3.8–5.2)
RBC #/AREA URNS HPF: ABNORMAL /HPF (ref 0–5)
SP GR UR REFRACTOMETRY: 1.02 (ref 1–1.03)
UA: UC IF INDICATED,UAUC: ABNORMAL
UROBILINOGEN UR QL STRIP.AUTO: 1 EU/DL (ref 0.2–1)
WBC # BLD AUTO: 11.9 K/UL (ref 3.6–11)
WBC URNS QL MICRO: ABNORMAL /HPF (ref 0–4)

## 2022-04-22 PROCEDURE — 96375 TX/PRO/DX INJ NEW DRUG ADDON: CPT

## 2022-04-22 PROCEDURE — 85025 COMPLETE CBC W/AUTO DIFF WBC: CPT

## 2022-04-22 PROCEDURE — 81001 URINALYSIS AUTO W/SCOPE: CPT

## 2022-04-22 PROCEDURE — 36415 COLL VENOUS BLD VENIPUNCTURE: CPT

## 2022-04-22 PROCEDURE — 80053 COMPREHEN METABOLIC PANEL: CPT

## 2022-04-22 PROCEDURE — 99285 EMERGENCY DEPT VISIT HI MDM: CPT

## 2022-04-22 PROCEDURE — 96374 THER/PROPH/DIAG INJ IV PUSH: CPT

## 2022-04-23 ENCOUNTER — APPOINTMENT (OUTPATIENT)
Dept: CT IMAGING | Age: 52
End: 2022-04-23
Attending: STUDENT IN AN ORGANIZED HEALTH CARE EDUCATION/TRAINING PROGRAM
Payer: COMMERCIAL

## 2022-04-23 ENCOUNTER — HOSPITAL ENCOUNTER (EMERGENCY)
Age: 52
Discharge: HOME | End: 2022-04-23
Attending: STUDENT IN AN ORGANIZED HEALTH CARE EDUCATION/TRAINING PROGRAM
Payer: COMMERCIAL

## 2022-04-23 DIAGNOSIS — K21.9 GASTROESOPHAGEAL REFLUX DISEASE, UNSPECIFIED WHETHER ESOPHAGITIS PRESENT: ICD-10-CM

## 2022-04-23 DIAGNOSIS — R10.84 ABDOMINAL PAIN, GENERALIZED: Primary | ICD-10-CM

## 2022-04-23 DIAGNOSIS — R82.71 BACTERIURIA: ICD-10-CM

## 2022-04-23 DIAGNOSIS — R11.2 NAUSEA AND VOMITING, UNSPECIFIED VOMITING TYPE: ICD-10-CM

## 2022-04-23 LAB
ALBUMIN SERPL-MCNC: 3.5 G/DL (ref 3.5–5)
ALBUMIN/GLOB SERPL: 0.9 {RATIO} (ref 1.1–2.2)
ALP SERPL-CCNC: 103 U/L (ref 45–117)
ALT SERPL-CCNC: 96 U/L (ref 12–78)
ANION GAP SERPL CALC-SCNC: 7 MMOL/L (ref 5–15)
AST SERPL-CCNC: 38 U/L (ref 15–37)
BILIRUB SERPL-MCNC: 0.5 MG/DL (ref 0.2–1)
BUN SERPL-MCNC: 16 MG/DL (ref 6–20)
BUN/CREAT SERPL: 16 (ref 12–20)
CALCIUM SERPL-MCNC: 9.2 MG/DL (ref 8.5–10.1)
CHLORIDE SERPL-SCNC: 107 MMOL/L (ref 97–108)
CO2 SERPL-SCNC: 25 MMOL/L (ref 21–32)
CREAT SERPL-MCNC: 0.99 MG/DL (ref 0.55–1.02)
GLOBULIN SER CALC-MCNC: 3.8 G/DL (ref 2–4)
GLUCOSE SERPL-MCNC: 181 MG/DL (ref 65–100)
POTASSIUM SERPL-SCNC: 3.5 MMOL/L (ref 3.5–5.1)
PROT SERPL-MCNC: 7.3 G/DL (ref 6.4–8.2)
SODIUM SERPL-SCNC: 139 MMOL/L (ref 136–145)

## 2022-04-23 PROCEDURE — 74011250636 HC RX REV CODE- 250/636: Performed by: STUDENT IN AN ORGANIZED HEALTH CARE EDUCATION/TRAINING PROGRAM

## 2022-04-23 PROCEDURE — 74011250637 HC RX REV CODE- 250/637: Performed by: STUDENT IN AN ORGANIZED HEALTH CARE EDUCATION/TRAINING PROGRAM

## 2022-04-23 PROCEDURE — 74177 CT ABD & PELVIS W/CONTRAST: CPT

## 2022-04-23 PROCEDURE — 87186 SC STD MICRODIL/AGAR DIL: CPT

## 2022-04-23 PROCEDURE — 74011000250 HC RX REV CODE- 250: Performed by: STUDENT IN AN ORGANIZED HEALTH CARE EDUCATION/TRAINING PROGRAM

## 2022-04-23 PROCEDURE — 87086 URINE CULTURE/COLONY COUNT: CPT

## 2022-04-23 PROCEDURE — 96375 TX/PRO/DX INJ NEW DRUG ADDON: CPT

## 2022-04-23 PROCEDURE — 96374 THER/PROPH/DIAG INJ IV PUSH: CPT

## 2022-04-23 PROCEDURE — 87077 CULTURE AEROBIC IDENTIFY: CPT

## 2022-04-23 PROCEDURE — 74011000636 HC RX REV CODE- 636: Performed by: STUDENT IN AN ORGANIZED HEALTH CARE EDUCATION/TRAINING PROGRAM

## 2022-04-23 RX ORDER — PHENOL/SODIUM PHENOLATE
20 AEROSOL, SPRAY (ML) MUCOUS MEMBRANE DAILY
Qty: 30 TABLET | Refills: 0 | Status: SHIPPED | OUTPATIENT
Start: 2022-04-23

## 2022-04-23 RX ORDER — CEPHALEXIN 250 MG/1
500 CAPSULE ORAL
Status: COMPLETED | OUTPATIENT
Start: 2022-04-23 | End: 2022-04-23

## 2022-04-23 RX ORDER — CEPHALEXIN 500 MG/1
500 CAPSULE ORAL 4 TIMES DAILY
Qty: 28 CAPSULE | Refills: 0 | Status: SHIPPED | OUTPATIENT
Start: 2022-04-23 | End: 2022-04-30

## 2022-04-23 RX ORDER — ONDANSETRON 4 MG/1
4 TABLET, FILM COATED ORAL
Qty: 20 TABLET | Refills: 0 | Status: SHIPPED | OUTPATIENT
Start: 2022-04-23

## 2022-04-23 RX ORDER — ONDANSETRON 2 MG/ML
4 INJECTION INTRAMUSCULAR; INTRAVENOUS
Status: COMPLETED | OUTPATIENT
Start: 2022-04-23 | End: 2022-04-23

## 2022-04-23 RX ORDER — FAMOTIDINE 20 MG/1
20 TABLET, FILM COATED ORAL 2 TIMES DAILY
Qty: 28 TABLET | Refills: 0 | Status: SHIPPED | OUTPATIENT
Start: 2022-04-23 | End: 2022-04-23 | Stop reason: ALTCHOICE

## 2022-04-23 RX ORDER — MORPHINE SULFATE 2 MG/ML
2 INJECTION, SOLUTION INTRAMUSCULAR; INTRAVENOUS
Status: COMPLETED | OUTPATIENT
Start: 2022-04-23 | End: 2022-04-23

## 2022-04-23 RX ADMIN — ONDANSETRON 4 MG: 2 INJECTION INTRAMUSCULAR; INTRAVENOUS at 01:28

## 2022-04-23 RX ADMIN — MORPHINE SULFATE 2 MG: 2 INJECTION, SOLUTION INTRAMUSCULAR; INTRAVENOUS at 01:28

## 2022-04-23 RX ADMIN — ALUMINUM HYDROXIDE AND MAGNESIUM HYDROXIDE 40 ML: 200; 200 SUSPENSION ORAL at 02:33

## 2022-04-23 RX ADMIN — CEPHALEXIN 500 MG: 250 CAPSULE ORAL at 02:34

## 2022-04-23 RX ADMIN — SODIUM CHLORIDE 500 ML: 9 INJECTION, SOLUTION INTRAVENOUS at 01:28

## 2022-04-23 RX ADMIN — IOPAMIDOL 100 ML: 755 INJECTION, SOLUTION INTRAVENOUS at 01:40

## 2022-04-23 NOTE — ED PROVIDER NOTES
EMERGENCY DEPARTMENT HISTORY AND PHYSICAL EXAM      Date: 2022  Patient Name: Dani Alexander    History of Presenting Illness     Chief Complaint   Patient presents with    Abdominal Pain     pt presents to ed due to n/v that has been ongoing for 4 days along with generalized central abdominal pain. Pt has hx of umbilical surgery hernia and states that her sx today feel similar. History Provided By: Patient    HPI: Dani Alexander, 50 y.o. female with past medical history of umbilical hernia status post repair in 5345, complicated by postoperative ileus/SBO, presents to the ED with cc of mid abdominal pain associated with nausea and vomiting, excessive belching x4 days. Patient reports symptoms remind her of her previous pain that she experienced with her umbilical hernia. Reports she has been having bowel movements every other day, and has been passing gas. She denies any fevers or chills. Patient reports that she is concerned about possibility of bowel obstruction considering similarities in symptoms. She denies any urinary symptoms. No fevers or chills. Patient reports she is not sure if she could be having symptoms of acid reflux as she has never had acid reflux in the past.  She reports that she does take diclofenac twice a day. PCP: Donnell Zuniga NP    No current facility-administered medications on file prior to encounter. Current Outpatient Medications on File Prior to Encounter   Medication Sig Dispense Refill    diclofenac EC (VOLTAREN) 75 mg EC tablet Take 1 Tablet by mouth two (2) times a day. As needed for hip 60 Tablet 1    phentermine (ADIPEX-P) 37.5 mg tablet Take 1 Tablet by mouth every morning.  Max Daily Amount: 37.5 mg. 30 Tablet 1       Past History     Past Medical History:  Past Medical History:   Diagnosis Date    Spinal stenosis        Past Surgical History:  Past Surgical History:   Procedure Laterality Date    HX  SECTION      x2    HX GYN      HX HERNIA REPAIR  09/11/2021    Dr. Karli Mcgee    HX LAPAROSCOPIC SUPRACERVICAL HYSTERECTOMY  2013    HX OTHER SURGICAL      jaw surgery    HX TUBAL LIGATION         Family History:  Family History   Problem Relation Age of Onset    Breast Cancer Maternal Aunt         age unknown       Social History:  Social History     Tobacco Use    Smoking status: Never Smoker    Smokeless tobacco: Never Used   Vaping Use    Vaping Use: Never used   Substance Use Topics    Alcohol use: Yes     Comment: occasionally    Drug use: No       Allergies: Allergies   Allergen Reactions    Other Food Swelling     Allergy to jicama root         Review of Systems   Review of Systems   Constitutional: Negative for chills and fever. HENT: Negative for congestion and rhinorrhea. Eyes: Negative for visual disturbance. Respiratory: Negative for chest tightness and shortness of breath. Cardiovascular: Negative for chest pain and palpitations. Gastrointestinal: Positive for abdominal pain, nausea and vomiting. Negative for diarrhea. Genitourinary: Negative for dysuria, flank pain and hematuria. Musculoskeletal: Negative for back pain and neck pain. Skin: Negative for rash. Allergic/Immunologic: Negative for immunocompromised state. Neurological: Negative for dizziness, speech difficulty, weakness and headaches. Hematological: Negative for adenopathy. Psychiatric/Behavioral: Negative for dysphoric mood and suicidal ideas. Physical Exam   Physical Exam  Vitals and nursing note reviewed. Constitutional:       General: She is not in acute distress. Appearance: She is not ill-appearing or toxic-appearing. HENT:      Head: Normocephalic and atraumatic. Nose: Nose normal.      Mouth/Throat:      Mouth: Mucous membranes are moist.   Eyes:      Extraocular Movements: Extraocular movements intact. Pupils: Pupils are equal, round, and reactive to light.    Cardiovascular:      Rate and Rhythm: Normal rate and regular rhythm. Pulses: Normal pulses. Pulmonary:      Effort: Pulmonary effort is normal.      Breath sounds: No stridor. No wheezing or rhonchi. Abdominal:      General: Abdomen is flat. There is no distension. Tenderness: There is abdominal tenderness in the periumbilical area. Musculoskeletal:         General: Normal range of motion. Cervical back: Normal range of motion and neck supple. Skin:     General: Skin is warm and dry. Neurological:      General: No focal deficit present. Mental Status: She is alert and oriented to person, place, and time.    Psychiatric:         Judgment: Judgment normal.         Diagnostic Study Results     Labs -     Recent Results (from the past 24 hour(s))   URINALYSIS W/ REFLEX CULTURE    Collection Time: 04/22/22 11:20 PM    Specimen: Urine   Result Value Ref Range    Color YELLOW/STRAW      Appearance CLOUDY (A) CLEAR      Specific gravity 1.019 1.003 - 1.030      pH (UA) 5.0 5.0 - 8.0      Protein Negative NEG mg/dL    Glucose Negative NEG mg/dL    Ketone Negative NEG mg/dL    Bilirubin Negative NEG      Blood TRACE (A) NEG      Urobilinogen 1.0 0.2 - 1.0 EU/dL    Nitrites Negative NEG      Leukocyte Esterase SMALL (A) NEG      WBC 5-10 0 - 4 /hpf    RBC 0-5 0 - 5 /hpf    Epithelial cells MODERATE (A) FEW /lpf    Bacteria 4+ (A) NEG /hpf    UA:UC IF INDICATED CULTURE NOT INDICATED BY UA RESULT CNI      Hyaline cast 0-2 0 - 5 /lpf   CBC WITH AUTOMATED DIFF    Collection Time: 04/22/22 11:20 PM   Result Value Ref Range    WBC 11.9 (H) 3.6 - 11.0 K/uL    RBC 4.17 3.80 - 5.20 M/uL    HGB 13.9 11.5 - 16.0 g/dL    HCT 41.6 35.0 - 47.0 %    MCV 99.8 (H) 80.0 - 99.0 FL    MCH 33.3 26.0 - 34.0 PG    MCHC 33.4 30.0 - 36.5 g/dL    RDW 12.6 11.5 - 14.5 %    PLATELET 614 718 - 901 K/uL    MPV 10.6 8.9 - 12.9 FL    NRBC 0.0 0  WBC    ABSOLUTE NRBC 0.00 0.00 - 0.01 K/uL    NEUTROPHILS 62 32 - 75 %    LYMPHOCYTES 28 12 - 49 %    MONOCYTES 6 5 - 13 % EOSINOPHILS 3 0 - 7 %    BASOPHILS 1 0 - 1 %    IMMATURE GRANULOCYTES 0 0.0 - 0.5 %    ABS. NEUTROPHILS 7.4 1.8 - 8.0 K/UL    ABS. LYMPHOCYTES 3.3 0.8 - 3.5 K/UL    ABS. MONOCYTES 0.8 0.0 - 1.0 K/UL    ABS. EOSINOPHILS 0.4 0.0 - 0.4 K/UL    ABS. BASOPHILS 0.1 0.0 - 0.1 K/UL    ABS. IMM. GRANS. 0.0 0.00 - 0.04 K/UL    DF AUTOMATED     METABOLIC PANEL, COMPREHENSIVE    Collection Time: 04/22/22 11:20 PM   Result Value Ref Range    Sodium 139 136 - 145 mmol/L    Potassium 3.5 3.5 - 5.1 mmol/L    Chloride 107 97 - 108 mmol/L    CO2 25 21 - 32 mmol/L    Anion gap 7 5 - 15 mmol/L    Glucose 181 (H) 65 - 100 mg/dL    BUN 16 6 - 20 MG/DL    Creatinine 0.99 0.55 - 1.02 MG/DL    BUN/Creatinine ratio 16 12 - 20      GFR est AA >60 >60 ml/min/1.73m2    GFR est non-AA 59 (L) >60 ml/min/1.73m2    Calcium 9.2 8.5 - 10.1 MG/DL    Bilirubin, total 0.5 0.2 - 1.0 MG/DL    ALT (SGPT) 96 (H) 12 - 78 U/L    AST (SGOT) 38 (H) 15 - 37 U/L    Alk. phosphatase 103 45 - 117 U/L    Protein, total 7.3 6.4 - 8.2 g/dL    Albumin 3.5 3.5 - 5.0 g/dL    Globulin 3.8 2.0 - 4.0 g/dL    A-G Ratio 0.9 (L) 1.1 - 2.2         Radiologic Studies -   CT ABD PELV W CONT   Final Result   No acute findings. Incidentals as above including hepatic steatosis. CT Results  (Last 48 hours)               04/23/22 0140  CT ABD PELV W CONT Final result    Impression:  No acute findings. Incidentals as above including hepatic steatosis. Narrative:  EXAM: CT ABD PELV W CONT       INDICATION: mid abdominal pain, nausea, vomiting. Hx of SBO       COMPARISON: CT 9/16/2021. CONTRAST: 100 mL of Isovue-370. TECHNIQUE:    Following the uneventful intravenous administration of contrast, thin axial   images were obtained through the abdomen and pelvis. Coronal and sagittal   reconstructions were generated. Oral contrast was not administered.  CT dose   reduction was achieved through use of a standardized protocol tailored for this   examination and automatic exposure control for dose modulation. FINDINGS:    LOWER THORAX: No significant abnormality in the incidentally imaged lower chest.   LIVER: Diffusely hypodense with sparing about the gallbladder fossa. No focal   lesion. Normal enhancement of hepatic vascular structures. BILIARY TREE: Gallbladder is unremarkable. CBD is not dilated. SPLEEN: Unremarkable. PANCREAS: No mass or ductal dilatation. ADRENALS: Unremarkable. KIDNEYS: No mass, calculus, or hydronephrosis. STOMACH: Unremarkable. SMALL BOWEL: No dilatation or wall thickening. COLON: No dilatation or wall thickening. APPENDIX: Unremarkable. PERITONEUM: No ascites or pneumoperitoneum. RETROPERITONEUM: No lymphadenopathy or aortic aneurysm. REPRODUCTIVE ORGANS: Uterus and ovaries are unremarkable. URINARY BLADDER: No mass or calculus. BONES: Chronic T12 compression deformity. No acute fracture or destructive bone   lesion. Osteoarthritic changes of the hips. ABDOMINAL WALL: No mass or hernia. ADDITIONAL COMMENTS: N/A               CXR Results  (Last 48 hours)    None          Medical Decision Making   IMildred MD am the first provider for this patient, and I am the attending of record for this patient encounter. I reviewed the vital signs, available nursing notes, past medical history, past surgical history, family history and social history. Vital Signs-Reviewed the patient's vital signs. Patient Vitals for the past 24 hrs:   Temp Pulse Resp BP SpO2   04/22/22 2259 97.3 °F (36.3 °C) 98 18 122/87 98 %     Records Reviewed: Nursing Notes and Old Medical Records    Provider Notes (Medical Decision Making):   DDx: SBO, gastroenteritis, UTI, pyelonephritis, GERD/gastritis/PUD, RENA, dehydration, etc.    Plan: CBC, CMP, UA, CT abdomen pelvis. We will treat patient symptomatically with IV analgesics, IV antiemetics, and IVF. Labs remarkable for mild leukocytosis of 11.9.   Mildly increased ALT of 96, AST of 38.  UA show small leukocyte esterase, 5-10 WBCs, and 4+ bacteria. CT abdomen pelvis without acute findings. Incidental finding of hepatic steatosis. Results reviewed with the patient. Suspect her symptoms today could be secondary to UTI versus gastritis/GERD. UA does show signs of pyuria and bacteria, however is contaminated with moderate epithelial cells. Will send urine culture, err on the side of caution and start treatment with Keflex. We will also treat her for potential GERD/gastritis as patient is reporting increased belching, sensation of early satiety, and nausea and vomiting which are all consistent with this presentation. Patient does also take diclofenac several times daily, which is likely worsening the symptoms. Will administer GI cocktail in the ED. For home, will place patient on omeprazole and Zofran for treatment. Advised decreasing diclofenac use or switching to Tylenol. We will also provide full course of Keflex for potential UTI. PCP follow-up advised. Patient felt comfortable plan for discharge. Return precautions discussed. ED Course:   Initial assessment performed. The patient's presenting problems have been discussed, and they are in agreement with the care plan formulated and outlined with them. I have encouraged them to ask questions as they arise throughout their visit. Leela Durham MD      Disposition:  Discharge      DISCHARGE PLAN:  1. Discharge Medication List as of 4/23/2022  2:33 AM      START taking these medications    Details   cephALEXin (Keflex) 500 mg capsule Take 1 Capsule by mouth four (4) times daily for 7 days. , Normal, Disp-28 Capsule, R-0      ondansetron hcl (Zofran) 4 mg tablet Take 1 Tablet by mouth every eight (8) hours as needed for Nausea., Normal, Disp-20 Tablet, R-0      Omeprazole delayed release (PRILOSEC D/R) 20 mg tablet Take 1 Tablet by mouth daily. , Normal, Disp-30 Tablet, R-0         CONTINUE these medications which have NOT CHANGED    Details   diclofenac EC (VOLTAREN) 75 mg EC tablet Take 1 Tablet by mouth two (2) times a day. As needed for hip, Normal, Disp-60 Tablet, R-1      phentermine (ADIPEX-P) 37.5 mg tablet Take 1 Tablet by mouth every morning. Max Daily Amount: 37.5 mg., Normal, Disp-30 Tablet, R-1           2. Follow-up Information     Follow up With Specialties Details Why Contact Info    Damon Vincent NP Nurse Practitioner In 3 days  [address]  [phone]          3. Return to ED if worse     Diagnosis     Clinical Impression:   1. Abdominal pain, generalized    2. Bacteriuria    3. Gastroesophageal reflux disease, unspecified whether esophagitis present    4. Nausea and vomiting, unspecified vomiting type        Attestations:    Nisha Beltran MD    Please note that this dictation was completed with Noah, the computer voice recognition software. Quite often unanticipated grammatical, syntax, homophones, and other interpretive errors are inadvertently transcribed by the computer software. Please disregard these errors. Please excuse any errors that have escaped final proofreading. Thank you.

## 2022-04-25 LAB
BACTERIA SPEC CULT: ABNORMAL
CC UR VC: ABNORMAL
SERVICE CMNT-IMP: ABNORMAL

## 2022-04-29 DIAGNOSIS — M25.551 RIGHT HIP PAIN: ICD-10-CM

## 2022-04-29 RX ORDER — DICLOFENAC SODIUM 75 MG/1
75 TABLET, DELAYED RELEASE ORAL 2 TIMES DAILY
Qty: 60 TABLET | Refills: 1 | Status: SHIPPED | OUTPATIENT
Start: 2022-04-29 | End: 2022-06-08 | Stop reason: SDUPTHER

## 2022-05-02 ENCOUNTER — NURSE TRIAGE (OUTPATIENT)
Dept: OTHER | Facility: CLINIC | Age: 52
End: 2022-05-02

## 2022-05-02 ENCOUNTER — TELEPHONE (OUTPATIENT)
Dept: FAMILY MEDICINE CLINIC | Age: 52
End: 2022-05-02

## 2022-05-03 ENCOUNTER — OFFICE VISIT (OUTPATIENT)
Dept: FAMILY MEDICINE CLINIC | Age: 52
End: 2022-05-03

## 2022-05-03 VITALS
OXYGEN SATURATION: 96 % | DIASTOLIC BLOOD PRESSURE: 70 MMHG | BODY MASS INDEX: 43.95 KG/M2 | RESPIRATION RATE: 18 BRPM | WEIGHT: 263.8 LBS | HEART RATE: 109 BPM | HEIGHT: 65 IN | TEMPERATURE: 98.4 F | SYSTOLIC BLOOD PRESSURE: 110 MMHG

## 2022-05-03 DIAGNOSIS — Z23 ENCOUNTER FOR IMMUNIZATION: ICD-10-CM

## 2022-05-03 DIAGNOSIS — T36.95XA ANTIBIOTIC-INDUCED YEAST INFECTION: ICD-10-CM

## 2022-05-03 DIAGNOSIS — B37.9 ANTIBIOTIC-INDUCED YEAST INFECTION: ICD-10-CM

## 2022-05-03 DIAGNOSIS — Z12.11 SCREEN FOR COLON CANCER: ICD-10-CM

## 2022-05-03 DIAGNOSIS — G89.29 CHRONIC BILATERAL THORACIC BACK PAIN: Primary | ICD-10-CM

## 2022-05-03 DIAGNOSIS — M54.6 CHRONIC BILATERAL THORACIC BACK PAIN: Primary | ICD-10-CM

## 2022-05-03 DIAGNOSIS — R73.09 HEMOGLOBIN A1C LESS THAN 7.0%: ICD-10-CM

## 2022-05-03 DIAGNOSIS — Z12.31 ENCOUNTER FOR SCREENING MAMMOGRAM FOR MALIGNANT NEOPLASM OF BREAST: ICD-10-CM

## 2022-05-03 DIAGNOSIS — R82.71 BACTERIA IN URINE: ICD-10-CM

## 2022-05-03 LAB
BILIRUB UR QL STRIP: NORMAL
GLUCOSE UR-MCNC: NEGATIVE MG/DL
HBA1C MFR BLD HPLC: 5.6 %
KETONES P FAST UR STRIP-MCNC: NEGATIVE MG/DL
PH UR STRIP: 5.5 [PH] (ref 4.6–8)
PROT UR QL STRIP: NORMAL
SP GR UR STRIP: 1.03 (ref 1–1.03)
UA UROBILINOGEN AMB POC: NORMAL (ref 0.2–1)
URINALYSIS CLARITY POC: NORMAL
URINALYSIS COLOR POC: NORMAL
URINE BLOOD POC: NEGATIVE
URINE LEUKOCYTES POC: NEGATIVE
URINE NITRITES POC: NEGATIVE

## 2022-05-03 PROCEDURE — 99214 OFFICE O/P EST MOD 30 MIN: CPT | Performed by: NURSE PRACTITIONER

## 2022-05-03 PROCEDURE — 90750 HZV VACC RECOMBINANT IM: CPT | Performed by: NURSE PRACTITIONER

## 2022-05-03 PROCEDURE — 81003 URINALYSIS AUTO W/O SCOPE: CPT | Performed by: NURSE PRACTITIONER

## 2022-05-03 PROCEDURE — 83036 HEMOGLOBIN GLYCOSYLATED A1C: CPT | Performed by: NURSE PRACTITIONER

## 2022-05-03 PROCEDURE — 90715 TDAP VACCINE 7 YRS/> IM: CPT | Performed by: NURSE PRACTITIONER

## 2022-05-03 RX ORDER — FLUCONAZOLE 150 MG/1
150 TABLET ORAL DAILY
Qty: 2 TABLET | Refills: 0 | Status: SHIPPED | OUTPATIENT
Start: 2022-05-03 | End: 2022-05-04

## 2022-05-03 RX ORDER — CYCLOBENZAPRINE HCL 10 MG
10 TABLET ORAL
Qty: 40 TABLET | Refills: 1 | Status: SHIPPED | OUTPATIENT
Start: 2022-05-03

## 2022-05-03 RX ORDER — FAMOTIDINE 20 MG/1
TABLET, FILM COATED ORAL
COMMUNITY
Start: 2022-04-23

## 2022-05-03 RX ORDER — LIDOCAINE 50 MG/G
PATCH TOPICAL
Qty: 30 EACH | Refills: 5 | Status: SHIPPED | OUTPATIENT
Start: 2022-05-03

## 2022-05-03 NOTE — PROGRESS NOTES
Chief Complaint   Patient presents with    Back Pain     ER visit 4/23       1. \"Have you been to the ER, urgent care clinic since your last visit? Hospitalized since your last visit? \" Yes When: [facility] ER 4/23/22 for back pain, UTI. Given Keflex, finished course    2. \"Have you seen or consulted any other health care providers outside of the [address] [place] [address] since your last visit? \" No     3. For patients aged 39-70: Has the patient had a colonoscopy / FIT/ Cologuard? No      If the patient is female:    4. For patients aged 41-77: Has the patient had a mammogram within the past 2 years? No      5. For patients aged 21-65: Has the patient had a pap smear?  No - hysterectomy    Health Maintenance Due   Topic Date Due    Hepatitis C Screening  Never done    DTaP/Tdap/Td series (1 - Tdap) 01/24/2006    Colorectal Cancer Screening Combo  Never done    Shingrix Vaccine Age 50> (1 of 2) Never done    Breast Cancer Screen Mammogram  01/22/2022     Results for orders placed or performed in visit on 05/03/22   AMB POC URINALYSIS DIP STICK AUTO W/O MICRO   Result Value Ref Range    Color (UA POC) Yanira     Clarity (UA POC) Cloudy     Glucose (UA POC) Negative Negative    Bilirubin (UA POC) 1+ Negative    Ketones (UA POC) Negative Negative    Specific gravity (UA POC) 1.030 1.001 - 1.035    Blood (UA POC) Negative Negative    pH (UA POC) 5.5 4.6 - 8.0    Protein (UA POC) 2+ Negative    Urobilinogen (UA POC) 0.2 mg/dL 0.2 - 1    Nitrites (UA POC) Negative Negative    Leukocyte esterase (UA POC) Negative Negative   AMB POC HEMOGLOBIN A1C   Result Value Ref Range    Hemoglobin A1c (POC) 5.6 % no

## 2022-05-03 NOTE — PROGRESS NOTES
Cari Aguayo (: 1970) is a 50 y.o. female, established patient, here for evaluation of the following chief complaint(s):  Back Pain (ER visit )       ASSESSMENT/PLAN:  Below is the assessment and plan developed based on review of pertinent history, physical exam, labs, studies, and medications. 1. Chronic bilateral thoracic back pain - T12 compression deformity on CT abd/pelvis. Will order dedicated thoracic spine xray. Given flexeril and lidocaine patches. If no improvement in 2-3 weeks, may need outpatient PT and ortho referral  -     XR SPINE THORAC 2 V; Future  -     cyclobenzaprine (FLEXERIL) 10 mg tablet; Take 1 Tablet by mouth three (3) times daily as needed for Muscle Spasm(s). , Normal, Disp-40 Tablet, R-1  -     lidocaine (LIDODERM) 5 %; Apply patch to the affected area for 12 hours a day and remove for 12 hours a day., Normal, Disp-30 Each, R-5  2. Bacteria in urine  -     AMB POC URINALYSIS DIP STICK AUTO W/O MICRO  3. Antibiotic-induced yeast infection  -     fluconazole (DIFLUCAN) 150 mg tablet; Take 1 Tablet by mouth daily for 1 day. Repeat in 3-4 days if no improvement in symptoms. FDA advises cautious use in pregnancy. , Normal, Disp-2 Tablet, R-0  4. Screen for colon cancer  -     REFERRAL FOR COLONOSCOPY  5. Encounter for screening mammogram for malignant neoplasm of breast  -     ALO MAMMO BI SCREENING INCL CAD; Future  6. Encounter for immunization  -     TETANUS, DIPHTHERIA TOXOIDS AND ACELLULAR PERTUSSIS VACCINE (TDAP), IN INDIVIDS. >=7, IM  -     ZOSTER VACC RECOMBINANT ADJUVANTED  7. Hemoglobin A1c less than 7.0%  -     AMB POC HEMOGLOBIN A1C      Return if symptoms worsen or fail to improve. SUBJECTIVE/OBJECTIVE:  HPI   Patient comes in today for back pain. Started few months ago, went away on own after 3 days. Then couple weeks later, lasted a day, then went away. Seems to have been more painful recently. BMs normal.    Was seen in ED on 22 for back pain.   Dx with UTI. Finished antibiotic  CT abd/pelvis normal.  Did note chronic T12 compression deformity  States it is a throbbing pain bilateral middle back. It makes her feel tight. Feels like stretching would help. Does not drink a lot during the day at work. No sodas  Allergies   Allergen Reactions    Other Food Swelling     Allergy to jicama root       Past Medical History:   Diagnosis Date    Spinal stenosis        Past Surgical History:   Procedure Laterality Date    HX  SECTION      x2    HX GYN      HX HERNIA REPAIR  2021    Dr. Wilhelmenia Hammans      HX OTHER SURGICAL      jaw surgery    HX TUBAL LIGATION         Social History     Socioeconomic History    Marital status: SINGLE     Spouse name: Not on file    Number of children: Not on file    Years of education: Not on file    Highest education level: Not on file   Occupational History    Not on file   Tobacco Use    Smoking status: Never Smoker    Smokeless tobacco: Never Used   Vaping Use    Vaping Use: Never used   Substance and Sexual Activity    Alcohol use: Yes     Comment: occasionally    Drug use: No    Sexual activity: Yes     Partners: Male     Birth control/protection: Surgical   Other Topics Concern    Not on file   Social History Narrative    Not on file     Social Determinants of Health     Financial Resource Strain:     Difficulty of Paying Living Expenses: Not on file   Food Insecurity:     Worried About Running Out of Food in the Last Year: Not on file    [place] of Food in the Last Year: Not on file   Transportation Needs:     Lack of Transportation (Medical): Not on file    Lack of Transportation (Non-Medical):  Not on file   Physical Activity:     Days of Exercise per Week: Not on file    Minutes of Exercise per Session: Not on file   Stress:     Feeling of Stress : Not on file   Social Connections:     Frequency of Communication with Friends and Family: Not on file    Frequency of Social Gatherings with Friends and Family: Not on file    Attends Buddhist Services: Not on file    Active Member of Clubs or Organizations: Not on file    Attends Club or Organization Meetings: Not on file    Marital Status: Not on file   Intimate Partner Violence:     Fear of Current or Ex-Partner: Not on file    Emotionally Abused: Not on file    Physically Abused: Not on file    Sexually Abused: Not on file   Housing Stability:     Unable to Pay for Housing in the Last Year: Not on file    Number of [place] in the Last Year: Not on file    Unstable Housing in the Last Year: Not on file       Family History   Problem Relation Age of Onset    Breast Cancer Maternal Aunt         age unknown       Current Outpatient Medications   Medication Sig    fluconazole (DIFLUCAN) 150 mg tablet Take 1 Tablet by mouth daily for 1 day. Repeat in 3-4 days if no improvement in symptoms. FDA advises cautious use in pregnancy.  cyclobenzaprine (FLEXERIL) 10 mg tablet Take 1 Tablet by mouth three (3) times daily as needed for Muscle Spasm(s).  lidocaine (LIDODERM) 5 % Apply patch to the affected area for 12 hours a day and remove for 12 hours a day.  diclofenac EC (VOLTAREN) 75 mg EC tablet Take 1 Tablet by mouth two (2) times a day. As needed for hip    ondansetron hcl (Zofran) 4 mg tablet Take 1 Tablet by mouth every eight (8) hours as needed for Nausea.  phentermine (ADIPEX-P) 37.5 mg tablet Take 1 Tablet by mouth every morning. Max Daily Amount: 37.5 mg.    famotidine (PEPCID) 20 mg tablet TAKE 1 TABLET BY MOUTH TWO (2) TIMES A DAY FOR 14 DAYS. (Patient not taking: Reported on 5/3/2022)    Omeprazole delayed release (PRILOSEC D/R) 20 mg tablet Take 1 Tablet by mouth daily. (Patient not taking: Reported on 5/3/2022)     No current facility-administered medications for this visit. Review of Systems   Constitutional: Negative. Respiratory: Negative. Cardiovascular: Negative. Gastrointestinal: Negative. Genitourinary: Negative. Musculoskeletal: Positive for back pain. Neurological: Negative for weakness and numbness. Physical Exam  Vitals reviewed. Constitutional:       Appearance: Normal appearance. Cardiovascular:      Rate and Rhythm: Normal rate. Pulmonary:      Effort: Pulmonary effort is normal.   Abdominal:      Tenderness: There is no right CVA tenderness or left CVA tenderness. Musculoskeletal:      Thoracic back: Normal. No swelling, deformity, spasms, tenderness or bony tenderness. Normal range of motion. Comments: NV/sensation intact BLEs, BUEs   Skin:     General: Skin is warm and dry. Neurological:      Mental Status: She is alert and oriented to person, place, and time. Psychiatric:         Mood and Affect: Mood normal.         Thought Content: Thought content normal.         Judgment: Judgment normal.       On this date 05/03/2022 I have spent 30 minutes reviewing previous notes, test results and face to face with the patient discussing the diagnosis and importance of compliance with the treatment plan as well as documenting on the day of the visit. An electronic signature was used to authenticate this note.   -- Tuan Terrazas, NP

## 2022-05-03 NOTE — PROGRESS NOTES
Verbal Order with Readback given by Chuck Chand NP for Shingrix, dose 1. Given in Right Deltoid without difficulty. Verbal Order with Readback given by Chuck Chand NP for TDAP. Given in Left Deltoid without difficulty.

## 2022-05-04 ENCOUNTER — TELEPHONE (OUTPATIENT)
Dept: FAMILY MEDICINE CLINIC | Age: 52
End: 2022-05-04

## 2022-05-05 NOTE — TELEPHONE ENCOUNTER
DENIED. The request for coverage for Lidocaine Pad 5%, use as directed (30 per month), is denied. This decision is based on health plan criteria for Lidocaine. This medicine is covered only if:  You have one of the following diagnoses:  (A) Post-herpetic neuralgia. (B) Neuropathic pain. The information provided does not show that you meet the criteria listed above. Reviewed by: Jonn Mcdonald Ph.

## 2022-06-08 DIAGNOSIS — M25.551 RIGHT HIP PAIN: ICD-10-CM

## 2022-06-08 DIAGNOSIS — M16.0 BILATERAL PRIMARY OSTEOARTHRITIS OF HIP: ICD-10-CM

## 2022-06-08 RX ORDER — DICLOFENAC SODIUM 75 MG/1
75 TABLET, DELAYED RELEASE ORAL 2 TIMES DAILY
Qty: 60 TABLET | Refills: 1 | Status: SHIPPED | OUTPATIENT
Start: 2022-06-08 | End: 2022-09-21 | Stop reason: ALTCHOICE

## 2022-06-08 RX ORDER — HYDROCODONE BITARTRATE AND ACETAMINOPHEN 5; 325 MG/1; MG/1
1 TABLET ORAL
Qty: 21 TABLET | Refills: 0 | Status: SHIPPED | OUTPATIENT
Start: 2022-06-08 | End: 2022-06-15

## 2022-09-04 ENCOUNTER — APPOINTMENT (OUTPATIENT)
Dept: CT IMAGING | Age: 52
End: 2022-09-04
Attending: EMERGENCY MEDICINE
Payer: COMMERCIAL

## 2022-09-04 ENCOUNTER — APPOINTMENT (OUTPATIENT)
Dept: GENERAL RADIOLOGY | Age: 52
End: 2022-09-04
Attending: EMERGENCY MEDICINE
Payer: COMMERCIAL

## 2022-09-04 ENCOUNTER — HOSPITAL ENCOUNTER (EMERGENCY)
Age: 52
Discharge: HOME | End: 2022-09-04
Attending: EMERGENCY MEDICINE
Payer: COMMERCIAL

## 2022-09-04 VITALS
RESPIRATION RATE: 20 BRPM | OXYGEN SATURATION: 92 % | BODY MASS INDEX: 43.64 KG/M2 | DIASTOLIC BLOOD PRESSURE: 74 MMHG | HEART RATE: 85 BPM | WEIGHT: 261.91 LBS | SYSTOLIC BLOOD PRESSURE: 113 MMHG | HEIGHT: 65 IN | TEMPERATURE: 97.7 F

## 2022-09-04 DIAGNOSIS — N30.00 ACUTE CYSTITIS WITHOUT HEMATURIA: ICD-10-CM

## 2022-09-04 DIAGNOSIS — M54.6 ACUTE BILATERAL THORACIC BACK PAIN: Primary | ICD-10-CM

## 2022-09-04 LAB
ALBUMIN SERPL-MCNC: 3.6 G/DL (ref 3.5–5)
ALBUMIN/GLOB SERPL: 0.9 {RATIO} (ref 1.1–2.2)
ALP SERPL-CCNC: 88 U/L (ref 45–117)
ALT SERPL-CCNC: 75 U/L (ref 12–78)
ANION GAP SERPL CALC-SCNC: 7 MMOL/L (ref 5–15)
APPEARANCE UR: ABNORMAL
AST SERPL-CCNC: 36 U/L (ref 15–37)
ATRIAL RATE: 88 BPM
BACTERIA URNS QL MICRO: ABNORMAL /HPF
BASOPHILS # BLD: 0.1 K/UL (ref 0–0.1)
BASOPHILS NFR BLD: 1 % (ref 0–1)
BILIRUB SERPL-MCNC: 0.9 MG/DL (ref 0.2–1)
BILIRUB UR QL: NEGATIVE
BNP SERPL-MCNC: 33 PG/ML
BUN SERPL-MCNC: 17 MG/DL (ref 6–20)
BUN/CREAT SERPL: 22 (ref 12–20)
CALCIUM SERPL-MCNC: 9.4 MG/DL (ref 8.5–10.1)
CALCULATED P AXIS, ECG09: 45 DEGREES
CALCULATED R AXIS, ECG10: 47 DEGREES
CALCULATED T AXIS, ECG11: 72 DEGREES
CAOX CRY URNS QL MICRO: ABNORMAL
CHLORIDE SERPL-SCNC: 106 MMOL/L (ref 97–108)
CO2 SERPL-SCNC: 25 MMOL/L (ref 21–32)
COLOR UR: ABNORMAL
CREAT SERPL-MCNC: 0.77 MG/DL (ref 0.55–1.02)
D DIMER PPP FEU-MCNC: 0.97 MG/L FEU (ref 0–0.65)
DIAGNOSIS, 93000: NORMAL
DIFFERENTIAL METHOD BLD: NORMAL
EOSINOPHIL # BLD: 0.4 K/UL (ref 0–0.4)
EOSINOPHIL NFR BLD: 4 % (ref 0–7)
EPITH CASTS URNS QL MICRO: ABNORMAL /LPF
ERYTHROCYTE [DISTWIDTH] IN BLOOD BY AUTOMATED COUNT: 12.1 % (ref 11.5–14.5)
GLOBULIN SER CALC-MCNC: 3.8 G/DL (ref 2–4)
GLUCOSE SERPL-MCNC: 105 MG/DL (ref 65–100)
GLUCOSE UR STRIP.AUTO-MCNC: NEGATIVE MG/DL
HCT VFR BLD AUTO: 41.4 % (ref 35–47)
HGB BLD-MCNC: 13.9 G/DL (ref 11.5–16)
HGB UR QL STRIP: NEGATIVE
IMM GRANULOCYTES # BLD AUTO: 0 K/UL (ref 0–0.04)
IMM GRANULOCYTES NFR BLD AUTO: 0 % (ref 0–0.5)
KETONES UR QL STRIP.AUTO: ABNORMAL MG/DL
LEUKOCYTE ESTERASE UR QL STRIP.AUTO: ABNORMAL
LYMPHOCYTES # BLD: 2.7 K/UL (ref 0.8–3.5)
LYMPHOCYTES NFR BLD: 28 % (ref 12–49)
MCH RBC QN AUTO: 32.8 PG (ref 26–34)
MCHC RBC AUTO-ENTMCNC: 33.6 G/DL (ref 30–36.5)
MCV RBC AUTO: 97.6 FL (ref 80–99)
MONOCYTES # BLD: 0.8 K/UL (ref 0–1)
MONOCYTES NFR BLD: 8 % (ref 5–13)
MUCOUS THREADS URNS QL MICRO: ABNORMAL /LPF
NEUTS SEG # BLD: 5.7 K/UL (ref 1.8–8)
NEUTS SEG NFR BLD: 59 % (ref 32–75)
NITRITE UR QL STRIP.AUTO: NEGATIVE
NRBC # BLD: 0 K/UL (ref 0–0.01)
NRBC BLD-RTO: 0 PER 100 WBC
P-R INTERVAL, ECG05: 160 MS
PH UR STRIP: 5 [PH] (ref 5–8)
PLATELET # BLD AUTO: 293 K/UL (ref 150–400)
PMV BLD AUTO: 10.3 FL (ref 8.9–12.9)
POTASSIUM SERPL-SCNC: 3.8 MMOL/L (ref 3.5–5.1)
PROT SERPL-MCNC: 7.4 G/DL (ref 6.4–8.2)
PROT UR STRIP-MCNC: ABNORMAL MG/DL
Q-T INTERVAL, ECG07: 358 MS
QRS DURATION, ECG06: 78 MS
QTC CALCULATION (BEZET), ECG08: 433 MS
RBC # BLD AUTO: 4.24 M/UL (ref 3.8–5.2)
RBC #/AREA URNS HPF: ABNORMAL /HPF (ref 0–5)
SODIUM SERPL-SCNC: 138 MMOL/L (ref 136–145)
SP GR UR REFRACTOMETRY: 1.03 (ref 1–1.03)
TROPONIN-HIGH SENSITIVITY: <4 NG/L (ref 0–51)
UA: UC IF INDICATED,UAUC: ABNORMAL
UROBILINOGEN UR QL STRIP.AUTO: 1 EU/DL (ref 0.2–1)
VENTRICULAR RATE, ECG03: 88 BPM
WBC # BLD AUTO: 9.6 K/UL (ref 3.6–11)
WBC URNS QL MICRO: ABNORMAL /HPF (ref 0–4)

## 2022-09-04 PROCEDURE — 93005 ELECTROCARDIOGRAM TRACING: CPT

## 2022-09-04 PROCEDURE — 74011000636 HC RX REV CODE- 636: Performed by: EMERGENCY MEDICINE

## 2022-09-04 PROCEDURE — 96374 THER/PROPH/DIAG INJ IV PUSH: CPT

## 2022-09-04 PROCEDURE — 85025 COMPLETE CBC W/AUTO DIFF WBC: CPT

## 2022-09-04 PROCEDURE — 84484 ASSAY OF TROPONIN QUANT: CPT

## 2022-09-04 PROCEDURE — 71275 CT ANGIOGRAPHY CHEST: CPT

## 2022-09-04 PROCEDURE — 83880 ASSAY OF NATRIURETIC PEPTIDE: CPT

## 2022-09-04 PROCEDURE — 80053 COMPREHEN METABOLIC PANEL: CPT

## 2022-09-04 PROCEDURE — 81001 URINALYSIS AUTO W/SCOPE: CPT

## 2022-09-04 PROCEDURE — 99285 EMERGENCY DEPT VISIT HI MDM: CPT

## 2022-09-04 PROCEDURE — 36415 COLL VENOUS BLD VENIPUNCTURE: CPT

## 2022-09-04 PROCEDURE — 74011250636 HC RX REV CODE- 250/636: Performed by: EMERGENCY MEDICINE

## 2022-09-04 PROCEDURE — 85379 FIBRIN DEGRADATION QUANT: CPT

## 2022-09-04 PROCEDURE — 71046 X-RAY EXAM CHEST 2 VIEWS: CPT

## 2022-09-04 RX ORDER — METHOCARBAMOL 500 MG/1
500 TABLET, FILM COATED ORAL 4 TIMES DAILY
Qty: 56 TABLET | Refills: 0 | Status: SHIPPED | OUTPATIENT
Start: 2022-09-04 | End: 2022-09-18

## 2022-09-04 RX ORDER — FLUCONAZOLE 150 MG/1
150 TABLET ORAL
Qty: 1 TABLET | Refills: 0 | Status: SHIPPED | OUTPATIENT
Start: 2022-09-04 | End: 2022-09-04

## 2022-09-04 RX ORDER — KETOROLAC TROMETHAMINE 30 MG/ML
15 INJECTION, SOLUTION INTRAMUSCULAR; INTRAVENOUS
Status: COMPLETED | OUTPATIENT
Start: 2022-09-04 | End: 2022-09-04

## 2022-09-04 RX ORDER — CEPHALEXIN 500 MG/1
500 CAPSULE ORAL 4 TIMES DAILY
Qty: 28 CAPSULE | Refills: 0 | Status: SHIPPED | OUTPATIENT
Start: 2022-09-04 | End: 2022-09-11

## 2022-09-04 RX ADMIN — IOPAMIDOL 100 ML: 755 INJECTION, SOLUTION INTRAVENOUS at 05:21

## 2022-09-04 RX ADMIN — KETOROLAC TROMETHAMINE 15 MG: 30 INJECTION, SOLUTION INTRAMUSCULAR at 03:59

## 2022-09-04 NOTE — ED PROVIDER NOTES
EMERGENCY DEPARTMENT HISTORY AND PHYSICAL EXAM      Date: 9/4/2022  Patient Name: Thomas Pate  Patient Age and Sex: 50 y.o. female     History of Presenting Illness     Chief Complaint   Patient presents with    Chest Pain    Back Pain     Pt states back pain located in between shoulder blades radiating to chest and down both arms started 3 days ago progressively getting worse. Today has nausea with pain. History Provided By: Patient    HPI: Thomas Pate is a 70-year-old history below presenting with back pain. She reports for the past few days she has had intermittent back pain. Back pain does radiate around to her ribs bilaterally. She has associated mild shortness of breath associated with this. She also has pain in both of her arms so this is been persistent for the past few weeks, not necessarily related to her back and chest pain. She denies any known aggravating injury. She has had mild associated nausea with no vomiting. Pain is intermittent, not exertional.  Random in nature with no obvious alleviating or exacerbating factors. She is taken Tylenol and used a heating pad with relief to her symptoms. No leg swelling no mops this, she does drive long distances for living. No recent surgery. There are no other complaints, changes, or physical findings at this time. PCP: Natali Agee NP    No current facility-administered medications on file prior to encounter. Current Outpatient Medications on File Prior to Encounter   Medication Sig Dispense Refill    diclofenac EC (VOLTAREN) 75 mg EC tablet Take 1 Tablet by mouth two (2) times a day. As needed for hip 60 Tablet 1    famotidine (PEPCID) 20 mg tablet TAKE 1 TABLET BY MOUTH TWO (2) TIMES A DAY FOR 14 DAYS. (Patient not taking: Reported on 5/3/2022)      cyclobenzaprine (FLEXERIL) 10 mg tablet Take 1 Tablet by mouth three (3) times daily as needed for Muscle Spasm(s).  40 Tablet 1    lidocaine (LIDODERM) 5 % Apply patch to the affected area for 12 hours a day and remove for 12 hours a day. 30 Each 5    ondansetron hcl (Zofran) 4 mg tablet Take 1 Tablet by mouth every eight (8) hours as needed for Nausea. 20 Tablet 0    Omeprazole delayed release (PRILOSEC D/R) 20 mg tablet Take 1 Tablet by mouth daily. (Patient not taking: Reported on 5/3/2022) 30 Tablet 0    phentermine (ADIPEX-P) 37.5 mg tablet Take 1 Tablet by mouth every morning. Max Daily Amount: 37.5 mg. 30 Tablet 1       Past History     Past Medical History:  Past Medical History:   Diagnosis Date    Spinal stenosis        Past Surgical History:  Past Surgical History:   Procedure Laterality Date    HX  SECTION      x2    HX GYN      HX HERNIA REPAIR  2021    Dr. Alfred Fishman LAPAROSCOPIC SUPRACERVICAL HYSTERECTOMY  2013    HX OTHER SURGICAL      jaw surgery    HX TUBAL LIGATION         Family History:  Family History   Problem Relation Age of Onset    Breast Cancer Maternal Aunt         age unknown       Social History:  Social History     Tobacco Use    Smoking status: Never    Smokeless tobacco: Never   Vaping Use    Vaping Use: Never used   Substance Use Topics    Alcohol use: Yes     Comment: occasionally    Drug use: No       Allergies: Allergies   Allergen Reactions    Other Food Swelling     Allergy to jicama root         Review of Systems   Review of Systems   Constitutional:  Negative for chills and fever. HENT:  Negative for congestion and rhinorrhea. Respiratory:  Positive for shortness of breath. Cardiovascular:  Positive for chest pain. Negative for palpitations and leg swelling. Gastrointestinal:  Positive for nausea. Negative for abdominal pain and vomiting. Genitourinary:  Negative for dysuria and frequency. Musculoskeletal:  Positive for back pain. Negative for myalgias. All other systems reviewed and are negative. Physical Exam   Physical Exam  Vitals and nursing note reviewed.    Constitutional:       General: She is not in acute distress. Appearance: Normal appearance. She is obese. She is not ill-appearing. HENT:      Head: Normocephalic. Mouth/Throat:      Mouth: Mucous membranes are moist.   Eyes:      Conjunctiva/sclera: Conjunctivae normal.   Cardiovascular:      Rate and Rhythm: Normal rate and regular rhythm. Pulses: Normal pulses. Comments: Symmetric radial and PT pulses  Pulmonary:      Effort: Pulmonary effort is normal.      Breath sounds: Normal breath sounds. Abdominal:      General: Abdomen is flat. Palpations: Abdomen is soft. Musculoskeletal:         General: No deformity. Comments: Mild tenderness to palpation in thoracic paraspinal muscles   Skin:     General: Skin is warm and dry. Neurological:      Mental Status: She is alert and oriented to person, place, and time. Mental status is at baseline. Psychiatric:         Behavior: Behavior normal.         Thought Content:  Thought content normal.        Diagnostic Study Results     Labs -     Recent Results (from the past 12 hour(s))   EKG, 12 LEAD, INITIAL    Collection Time: 09/04/22  1:44 AM   Result Value Ref Range    Ventricular Rate 88 BPM    Atrial Rate 88 BPM    P-R Interval 160 ms    QRS Duration 78 ms    Q-T Interval 358 ms    QTC Calculation (Bezet) 433 ms    Calculated P Axis 45 degrees    Calculated R Axis 47 degrees    Calculated T Axis 72 degrees    Diagnosis       Normal sinus rhythm  Normal ECG  When compared with ECG of 11-SEP-2021 03:33,  Nonspecific T wave abnormality no longer evident in Inferior leads     CBC WITH AUTOMATED DIFF    Collection Time: 09/04/22  1:49 AM   Result Value Ref Range    WBC 9.6 3.6 - 11.0 K/uL    RBC 4.24 3.80 - 5.20 M/uL    HGB 13.9 11.5 - 16.0 g/dL    HCT 41.4 35.0 - 47.0 %    MCV 97.6 80.0 - 99.0 FL    MCH 32.8 26.0 - 34.0 PG    MCHC 33.6 30.0 - 36.5 g/dL    RDW 12.1 11.5 - 14.5 %    PLATELET 922 601 - 674 K/uL    MPV 10.3 8.9 - 12.9 FL    NRBC 0.0 0  WBC    ABSOLUTE NRBC 0.00 0.00 - 0.01 K/uL    NEUTROPHILS 59 32 - 75 %    LYMPHOCYTES 28 12 - 49 %    MONOCYTES 8 5 - 13 %    EOSINOPHILS 4 0 - 7 %    BASOPHILS 1 0 - 1 %    IMMATURE GRANULOCYTES 0 0.0 - 0.5 %    ABS. NEUTROPHILS 5.7 1.8 - 8.0 K/UL    ABS. LYMPHOCYTES 2.7 0.8 - 3.5 K/UL    ABS. MONOCYTES 0.8 0.0 - 1.0 K/UL    ABS. EOSINOPHILS 0.4 0.0 - 0.4 K/UL    ABS. BASOPHILS 0.1 0.0 - 0.1 K/UL    ABS. IMM. GRANS. 0.0 0.00 - 0.04 K/UL    DF AUTOMATED     METABOLIC PANEL, COMPREHENSIVE    Collection Time: 09/04/22  1:49 AM   Result Value Ref Range    Sodium 138 136 - 145 mmol/L    Potassium 3.8 3.5 - 5.1 mmol/L    Chloride 106 97 - 108 mmol/L    CO2 25 21 - 32 mmol/L    Anion gap 7 5 - 15 mmol/L    Glucose 105 (H) 65 - 100 mg/dL    BUN 17 6 - 20 MG/DL    Creatinine 0.77 0.55 - 1.02 MG/DL    BUN/Creatinine ratio 22 (H) 12 - 20      GFR est AA >60 >60 ml/min/1.73m2    GFR est non-AA >60 >60 ml/min/1.73m2    Calcium 9.4 8.5 - 10.1 MG/DL    Bilirubin, total 0.9 0.2 - 1.0 MG/DL    ALT (SGPT) 75 12 - 78 U/L    AST (SGOT) 36 15 - 37 U/L    Alk.  phosphatase 88 45 - 117 U/L    Protein, total 7.4 6.4 - 8.2 g/dL    Albumin 3.6 3.5 - 5.0 g/dL    Globulin 3.8 2.0 - 4.0 g/dL    A-G Ratio 0.9 (L) 1.1 - 2.2     NT-PRO BNP    Collection Time: 09/04/22  1:49 AM   Result Value Ref Range    NT pro-BNP 33 <125 PG/ML   TROPONIN-HIGH SENSITIVITY    Collection Time: 09/04/22  1:49 AM   Result Value Ref Range    Troponin-High Sensitivity <4 0 - 51 ng/L   D DIMER    Collection Time: 09/04/22  4:03 AM   Result Value Ref Range    D-dimer 0.97 (H) 0.00 - 0.65 mg/L FEU   URINALYSIS W/ REFLEX CULTURE    Collection Time: 09/04/22  4:03 AM    Specimen: Urine   Result Value Ref Range    Color DARK YELLOW      Appearance CLOUDY (A) CLEAR      Specific gravity 1.030 1.003 - 1.030      pH (UA) 5.0 5.0 - 8.0      Protein TRACE (A) NEG mg/dL    Glucose Negative NEG mg/dL    Ketone TRACE (A) NEG mg/dL    Bilirubin Negative NEG      Blood Negative NEG      Urobilinogen 1.0 0.2 - 1.0 EU/dL    Nitrites Negative NEG      Leukocyte Esterase TRACE (A) NEG      WBC 5-10 0 - 4 /hpf    RBC 0-5 0 - 5 /hpf    Epithelial cells FEW FEW /lpf    Bacteria 1+ (A) NEG /hpf    UA:UC IF INDICATED CULTURE NOT INDICATED BY UA RESULT CNI      Mucus 2+ (A) NEG /lpf    CA Oxalate crystals 3+ (A) NEG       Radiologic Studies -   CTA CHEST W OR W WO CONT   Final Result   1. No evidence of pulmonary embolism. 2.  Mild bibasilar groundglass left lower lobe micronodule hernia, possibly   infectious or inflammatory. 3.  Age-indeterminate T12 compression fracture. 4.  Hepatic steatosis. 5.  40 mm ascending thoracic aortic ectasia. No dissection. XR CHEST PA LAT   Final Result   No acute cardiopulmonary abnormality. CT Results  (Last 48 hours)                 09/04/22 0543  CTA CHEST W OR W WO CONT Final result    Impression:  1. No evidence of pulmonary embolism. 2.  Mild bibasilar groundglass left lower lobe micronodule hernia, possibly   infectious or inflammatory. 3.  Age-indeterminate T12 compression fracture. 4.  Hepatic steatosis. 5.  40 mm ascending thoracic aortic ectasia. No dissection. Narrative:  EXAM:  CTA CHEST W OR W WO CONT       INDICATION:  Dyspnea, elevated d-dimer       COMPARISON: None. TECHNIQUE: Helical thin section chest CT following uneventful intravenous   administration of nonionic contrast according to departmental PE protocol. Coronal and sagittal reformats were performed. 3D/MIP post processing was   performed. CT dose reduction was achieved through use of a standardized protocol   tailored for this examination and automatic exposure control for dose   modulation. FINDINGS: This is a good quality study for the evaluation of pulmonary embolism   to the first subsegmental arterial level. There is no pulmonary embolism to this   level. 40 mm ascending thoracic aortic ectasia. Descending thoracic aorta is normal in   caliber. Cardiac size is within normal limits. No pericardial effusion. No   lymphadenopathy by imaging size criteria. Mild groundglass opacities in the lower lobes bilaterally micronodules at the   left lung base, possibly infectious/inflammatory. No pleural effusion or   pneumothorax. Central airways are unremarkable. Hypoattenuation of the liver is consistent with hepatic steatosis. Age   indeterminate compression fracture of T12. No aggressive osseous lesion. CXR Results  (Last 48 hours)                 09/04/22 0204  XR CHEST PA LAT Final result    Impression:  No acute cardiopulmonary abnormality. Narrative:  EXAM:  XR CHEST PA LAT       INDICATION:  Chest pain       COMPARISON: October 2013. TECHNIQUE: PA and lateral chest radiographs. FINDINGS: The lungs are clear. Heart size and mediastinal contours are normal.   No pleural effusion or pneumothorax. Bones are age-appropriate. Medical Decision Making   I am the first provider for this patient. I reviewed the vital signs, available nursing notes, past medical history, past surgical history, family history and social history. Vital Signs-Reviewed the patient's vital signs. Patient Vitals for the past 12 hrs:   Temp Pulse Resp BP SpO2   09/04/22 0604 97.7 °F (36.5 °C) 72 13 110/68 97 %   09/04/22 0519 -- 71 16 117/71 95 %   09/04/22 0414 -- 75 16 109/73 97 %   09/04/22 0325 97.7 °F (36.5 °C) -- -- -- --   09/04/22 0134 97.6 °F (36.4 °C) 91 20 137/88 97 %       Records Reviewed: Nursing Notes and Old Medical Records    Provider Notes (Medical Decision Making):   DDx ACS, PE, musculoskeletal back pain    Obtain EKG troponin chest x-ray to screen mediastinum however have a low suspicion of aortic pathology. Will obtain D-dimer given she is 52, cannot apply PERC, he is a  by trade. UA she stated this is similar to prior episodes of UTIs she has had.     ED Course:   Initial assessment performed. The patients presenting problems have been discussed, and they are in agreement with the care plan formulated and outlined with them. I have encouraged them to ask questions as they arise throughout their visit. ED Course as of 09/04/22 0648   Sun Sep 04, 2022   0301 Troponin undetectable BNP normal CMP normal CBC normal [WB]   0614 CTA shows no PE, mild bibasilar groundglass left lower lobe micronodule hernia infectious versus inflammatory. Age-indeterminate T12 compression fracture [WB]      ED Course User Index  [WB] Parth Chacon MD     Patient states compression fracture is chronic. Urine equivocal, will treat UTI, discharged with return precautions. Disposition:  Discharge Note:  The patient has been re-evaluated and is ready for discharge. Reviewed available results with patient. Counseled patient on diagnosis and care plan. Patient has expressed understanding, and all questions have been answered. Patient agrees with plan and agrees to follow up as recommended, or to return to the ED if their symptoms worsen. Discharge instructions have been provided and explained to the patient, along with reasons to return to the ED. PLAN:  Current Discharge Medication List        START taking these medications    Details   cephALEXin (Keflex) 500 mg capsule Take 1 Capsule by mouth four (4) times daily for 7 days. Qty: 28 Capsule, Refills: 0  Start date: 9/4/2022, End date: 9/11/2022      methocarbamoL (ROBAXIN) 500 mg tablet Take 1 Tablet by mouth four (4) times daily for 14 days. Qty: 56 Tablet, Refills: 0  Start date: 9/4/2022, End date: 9/18/2022      fluconazole (Diflucan) 150 mg tablet Take 1 Tablet by mouth now for 1 dose. FDA advises cautious prescribing of oral fluconazole in pregnancy. Qty: 1 Tablet, Refills: 0  Start date: 9/4/2022, End date: 9/4/2022           2.    Follow-up Information       Follow up With Specialties Details Why Contact Info    [facility] EMERGENCY DEPT Emergency Medicine  As needed, If symptoms worsen [address]  [address] [facility]  [phone]          3. Return to ED if worse     Diagnosis     Clinical Impression:   1. Acute bilateral thoracic back pain    2. Acute cystitis without hematuria        Attestations:    Guilherme Wang M.D. Please note that this dictation was completed with [employer], the computer voice recognition software. Quite often unanticipated grammatical, syntax, homophones, and other interpretive errors are inadvertently transcribed by the computer software. Please disregard these errors. Please excuse any errors that have escaped final proofreading. Thank you.

## 2022-09-04 NOTE — ED NOTES
DC info reviewed with Patient, all questions answered. Patient well-appearing at time of discharge, vital signs stable, no acute distress. Wheeled out of ED per her request at this time.

## 2022-09-16 DIAGNOSIS — M16.0 BILATERAL PRIMARY OSTEOARTHRITIS OF HIP: ICD-10-CM

## 2022-09-16 DIAGNOSIS — M25.551 RIGHT HIP PAIN: ICD-10-CM

## 2022-09-16 RX ORDER — HYDROCODONE BITARTRATE AND ACETAMINOPHEN 5; 325 MG/1; MG/1
1 TABLET ORAL
Qty: 21 TABLET | Refills: 0 | OUTPATIENT
Start: 2022-09-16 | End: 2022-09-23

## 2022-09-16 RX ORDER — DICLOFENAC SODIUM 75 MG/1
75 TABLET, DELAYED RELEASE ORAL 2 TIMES DAILY
Qty: 60 TABLET | Refills: 1 | OUTPATIENT
Start: 2022-09-16

## 2022-09-20 DIAGNOSIS — M16.0 BILATERAL PRIMARY OSTEOARTHRITIS OF HIP: Primary | ICD-10-CM

## 2022-09-21 ENCOUNTER — HOSPITAL ENCOUNTER (OUTPATIENT)
Dept: GENERAL RADIOLOGY | Age: 52
Discharge: HOME | End: 2022-09-21
Payer: COMMERCIAL

## 2022-09-21 ENCOUNTER — OFFICE VISIT (OUTPATIENT)
Dept: ORTHOPEDIC SURGERY | Age: 52
End: 2022-09-21
Payer: COMMERCIAL

## 2022-09-21 VITALS
HEIGHT: 65 IN | BODY MASS INDEX: 43.99 KG/M2 | HEART RATE: 108 BPM | OXYGEN SATURATION: 97 % | SYSTOLIC BLOOD PRESSURE: 116 MMHG | DIASTOLIC BLOOD PRESSURE: 72 MMHG | TEMPERATURE: 97.8 F | WEIGHT: 264 LBS

## 2022-09-21 DIAGNOSIS — M16.0 BILATERAL PRIMARY OSTEOARTHRITIS OF HIP: Primary | ICD-10-CM

## 2022-09-21 DIAGNOSIS — M16.0 BILATERAL PRIMARY OSTEOARTHRITIS OF HIP: ICD-10-CM

## 2022-09-21 PROCEDURE — 99213 OFFICE O/P EST LOW 20 MIN: CPT | Performed by: ORTHOPAEDIC SURGERY

## 2022-09-21 PROCEDURE — 73521 X-RAY EXAM HIPS BI 2 VIEWS: CPT

## 2022-09-21 PROCEDURE — 73502 X-RAY EXAM HIP UNI 2-3 VIEWS: CPT

## 2022-09-21 RX ORDER — HYDROCODONE BITARTRATE AND ACETAMINOPHEN 5; 325 MG/1; MG/1
1 TABLET ORAL
Qty: 42 TABLET | Refills: 0 | Status: SHIPPED | OUTPATIENT
Start: 2022-09-21 | End: 2022-09-28

## 2022-09-21 RX ORDER — DICLOFENAC SODIUM 75 MG/1
75 TABLET, DELAYED RELEASE ORAL 2 TIMES DAILY
Qty: 60 TABLET | Refills: 0 | Status: SHIPPED | OUTPATIENT
Start: 2022-09-21 | End: 2022-10-19 | Stop reason: SDUPTHER

## 2022-09-21 NOTE — PROGRESS NOTES
2022      CC: Bilateral hip pain    HPI:      This is a 50y.o. year old female who presents for a follow up visit. The patient was last seen and diagnosed with bilateral hip osteoarthritis. The patient's treatments since the most recent visit have comprised of weight loss efforts, diclofenac. The patient has had no relief of the chief complaint. PMH:  Past Medical History:   Diagnosis Date    Spinal stenosis        PSxHx:  Past Surgical History:   Procedure Laterality Date    HX  SECTION      x2    HX GYN      HX HERNIA REPAIR  2021    Dr. Uzma Lopez LAPAROSCOPIC SUPRACERVICAL HYSTERECTOMY      HX OTHER SURGICAL      jaw surgery    HX TUBAL LIGATION         Meds:    Current Outpatient Medications:     diclofenac EC (VOLTAREN) 75 mg EC tablet, Take 1 Tablet by mouth two (2) times a day., Disp: 60 Tablet, Rfl: 0    HYDROcodone-acetaminophen (Norco) 5-325 mg per tablet, Take 1 Tablet by mouth every four (4) hours as needed for Pain for up to 7 days. Max Daily Amount: 6 Tablets. , Disp: 42 Tablet, Rfl: 0    phentermine (ADIPEX-P) 37.5 mg tablet, Take 1 Tablet by mouth every morning. Max Daily Amount: 37.5 mg., Disp: 30 Tablet, Rfl: 1    famotidine (PEPCID) 20 mg tablet, TAKE 1 TABLET BY MOUTH TWO (2) TIMES A DAY FOR 14 DAYS. (Patient not taking: Reported on 5/3/2022), Disp: , Rfl:     cyclobenzaprine (FLEXERIL) 10 mg tablet, Take 1 Tablet by mouth three (3) times daily as needed for Muscle Spasm(s). , Disp: 40 Tablet, Rfl: 1    lidocaine (LIDODERM) 5 %, Apply patch to the affected area for 12 hours a day and remove for 12 hours a day., Disp: 30 Each, Rfl: 5    ondansetron hcl (Zofran) 4 mg tablet, Take 1 Tablet by mouth every eight (8) hours as needed for Nausea., Disp: 20 Tablet, Rfl: 0    Omeprazole delayed release (PRILOSEC D/R) 20 mg tablet, Take 1 Tablet by mouth daily. (Patient not taking: Reported on 5/3/2022), Disp: 30 Tablet, Rfl: 0    All:   Allergies   Allergen Reactions Other Food Swelling     Allergy to jicama root       Social Hx:  Social History     Socioeconomic History    Marital status: SINGLE   Tobacco Use    Smoking status: Never    Smokeless tobacco: Never   Vaping Use    Vaping Use: Never used   Substance and Sexual Activity    Alcohol use: Yes     Comment: occasionally    Drug use: No    Sexual activity: Yes     Partners: Male     Birth control/protection: Surgical       Family Hx:  Family History   Problem Relation Age of Onset    Breast Cancer Maternal Aunt         age unknown         Review of Systems:       General: Denies headache, lethargy, fever, weight loss  Ears/Nose/Throat: Denies ear discharge, drainage, nosebleeds, hoarse voice, dental problems  Cardiovascular: Denies chest pain, shortness of breath  Lungs: Denies chest pain, breathing problems, wheezing, pneumonia  Stomach: Denies stomach pain, heartburn, constipation, irritable bowel  Skin: Denies rash, sores, open wounds  Musculoskeletal: bilateral hip pain  Genitourinary: Denies dysuria, hematuria, polyuria  Gastrointestinal: Denies constipation, obstipation, diarrhea  Neurological: Denies changes in sight, smell, hearing, taste, seizures. Denies loss of consciousness.   Psychiatric: Denies depression, sleep pattern changes, anxiety, change in personality  Endocrine: Denies mood swings, heat or cold intolerance  Hematologic/Lymphatic: Denies anemia, purpura, petechia  Allergic/Immunologic: Denies swelling of throat, pain or swelling at lymph nodes      Physical Examination:    Visit Vitals  /72 (BP 1 Location: Right arm, BP Patient Position: Sitting, BP Cuff Size: Large adult)   Pulse (!) 108   Temp 97.8 °F (36.6 °C) (Tympanic)   Ht 5' 5\" (1.651 m)   Wt 264 lb (119.7 kg)   SpO2 97%   BMI 43.93 kg/m²        General: AOX3, no apparent distress  Psychiatric: mood and affect appropriate  Lungs: breathing is symmetric and unlabored bilaterally  Heart: regular rate and rhythm  Abdomen: no guarding  Head: normocephalic, atraumatic  Skin: No significant abnormalities, good turgor  Sensation intact to light touch: C5-T1 dermatomes  Muscular exam: 5/5 strength in all major muscle groups unless noted in specialty exam.    Extremities        left lower extremity:  No deformity is noted. Circumduction of the hip causes significant pain in the groin, reproductive of the chief complaint. Range of motion is limited, with a positive impingement sign. Gait is antalgic. Sensation is intact to light touch in the L1-S1 dermatomes. Skin is intact about the hip. Hip flexion and abduction strength is 4+/5, hip extension and knee extension as well as tibialis anterior and EHL/GCS are 5/5 strength.    right lower extremity: No deformity is noted. Circumduction of the hip causes significant pain in the groin, reproductive of the chief complaint. Range of motion is limited, with a positive impingement sign. Gait is antalgic. Sensation is intact to light touch in the L1-S1 dermatomes. Skin is intact about the hip. Hip flexion and abduction strength is 4+/5, hip extension and knee extension as well as tibialis anterior and EHL/GCS are 5/5 strength. Diagnostics:    Pertinent Diagnostics: Xrays are available of the bilateral hip, they indicate severe progressive osteoarthritis of the femoroacetabular joints, no significant other findings, no other osseus abnormalities, fractures, or dislocations. Assessment: Bilateral hip Osteoarthritis  Plan: This patient I had a long discussion regarding treatment options. We went over the nonoperative options, continued medications, injections, physical therapy, maintenance of ideal body weight. Patient has elected to proceed with weight loss efforts, minimum 14 lb weight loss. Norco and diclofenac prescribed. BMI restrictions for surgery were discussed in detail in regards to how much this increases her risk of infection.       Ms. Arielle Raines has a reminder for a \"due or due soon\" health maintenance. I have asked that she contact her primary care provider for follow-up on this health maintenance.

## 2022-09-21 NOTE — LETTER
9/21/2022    Patient: Alvin Brenner   YOB: 1970   Date of Visit: 9/21/2022     Ranjeet Valles NP  [address] [place] [address]  Via In Basket    Dear Ranjeet Valles NP,      Thank you for referring Ms. Suzanne Nazario to [facility] for evaluation. My notes for this consultation are attached. If you have questions, please do not hesitate to call me. I look forward to following your patient along with you.       Sincerely,    Giancarlo Mendez, DO

## 2022-09-21 NOTE — PROGRESS NOTES
Identified pt with two pt identifiers (name and ). Reviewed chart in preparation for visit and have obtained necessary documentation. Michell Olguin is a 50 y.o. female  Chief Complaint   Patient presents with    Follow-up     Bilateral hip     Visit Vitals  /72 (BP 1 Location: Right arm, BP Patient Position: Sitting, BP Cuff Size: Large adult)   Pulse (!) 108   Temp 97.8 °F (36.6 °C) (Tympanic)   Ht 5' 5\" (1.651 m)   Wt 264 lb (119.7 kg)   LMP 2011   SpO2 97%   BMI 43.93 kg/m²     1. Have you been to the ER, urgent care clinic since your last visit? Hospitalized since your last visit? No    2. Have you seen or consulted any other health care providers outside of the [address] [place] [address] since your last visit? Include any pap smears or colon screening.  No

## 2022-10-10 ENCOUNTER — TELEPHONE (OUTPATIENT)
Dept: ORTHOPEDIC SURGERY | Age: 52
End: 2022-10-10

## 2022-10-10 NOTE — TELEPHONE ENCOUNTER
Patient is asking for a letter excusing her from work until she comes in for her weight check. She is scheduled for her weight check in on 10/21/22.

## 2022-10-19 ENCOUNTER — TELEPHONE (OUTPATIENT)
Dept: ORTHOPEDIC SURGERY | Age: 52
End: 2022-10-19

## 2022-10-19 RX ORDER — DICLOFENAC SODIUM 75 MG/1
75 TABLET, DELAYED RELEASE ORAL 2 TIMES DAILY
Qty: 60 TABLET | Refills: 0 | Status: SHIPPED | OUTPATIENT
Start: 2022-10-19

## 2022-10-19 NOTE — TELEPHONE ENCOUNTER
LVM for patient asking her to call back to reschedule her 10/21/22 apt. Patient can be offered the 10/20/22 at 11:15 am or to a day the patient prefers.

## 2022-10-26 ENCOUNTER — OFFICE VISIT (OUTPATIENT)
Dept: ORTHOPEDIC SURGERY | Age: 52
End: 2022-10-26
Payer: COMMERCIAL

## 2022-10-26 VITALS
OXYGEN SATURATION: 94 % | WEIGHT: 260.8 LBS | TEMPERATURE: 97 F | SYSTOLIC BLOOD PRESSURE: 116 MMHG | DIASTOLIC BLOOD PRESSURE: 72 MMHG | BODY MASS INDEX: 43.45 KG/M2 | HEIGHT: 65 IN | HEART RATE: 94 BPM

## 2022-10-26 DIAGNOSIS — E66.01 CLASS 3 SEVERE OBESITY WITH BODY MASS INDEX (BMI) OF 45.0 TO 49.9 IN ADULT, UNSPECIFIED OBESITY TYPE, UNSPECIFIED WHETHER SERIOUS COMORBIDITY PRESENT (HCC): ICD-10-CM

## 2022-10-26 DIAGNOSIS — M16.0 BILATERAL PRIMARY OSTEOARTHRITIS OF HIP: Primary | ICD-10-CM

## 2022-10-26 PROCEDURE — 99212 OFFICE O/P EST SF 10 MIN: CPT | Performed by: ORTHOPAEDIC SURGERY

## 2022-10-26 NOTE — LETTER
10/26/2022    Patient: Amandeep Stringer   YOB: 1970   Date of Visit: 10/26/2022     Allyssa Hidalgo NP  [address] [place] [address]  Via In Basket    Dear Allyssa Hidalgo NP,      Thank you for referring Ms. Suzanne Nazario to [facility] for evaluation. My notes for this consultation are attached. If you have questions, please do not hesitate to call me. I look forward to following your patient along with you.       Sincerely,    Carlitos Rios, DO

## 2022-10-27 NOTE — PROGRESS NOTES
10/26/2022      CC: Bilateral hip pain    HPI:      This is a 50y.o. year old female who presents for a follow up visit. The patient was last seen and diagnosed with bilateral hip osteoarthritis. The patient's treatments since the most recent visit have comprised of weight loss efforts. The patient has had no relief of the chief complaint. PMH:  Past Medical History:   Diagnosis Date    Spinal stenosis        PSxHx:  Past Surgical History:   Procedure Laterality Date    HX  SECTION      x2    HX GYN      HX HERNIA REPAIR  2021    Dr. Jose Washington LAPAROSCOPIC SUPRACERVICAL HYSTERECTOMY      HX OTHER SURGICAL      jaw surgery    HX TUBAL LIGATION         Meds:    Current Outpatient Medications:     diclofenac EC (VOLTAREN) 75 mg EC tablet, Take 1 Tablet by mouth two (2) times a day., Disp: 60 Tablet, Rfl: 0    famotidine (PEPCID) 20 mg tablet, TAKE 1 TABLET BY MOUTH TWO (2) TIMES A DAY FOR 14 DAYS. (Patient not taking: Reported on 5/3/2022), Disp: , Rfl:     cyclobenzaprine (FLEXERIL) 10 mg tablet, Take 1 Tablet by mouth three (3) times daily as needed for Muscle Spasm(s). , Disp: 40 Tablet, Rfl: 1    lidocaine (LIDODERM) 5 %, Apply patch to the affected area for 12 hours a day and remove for 12 hours a day., Disp: 30 Each, Rfl: 5    ondansetron hcl (Zofran) 4 mg tablet, Take 1 Tablet by mouth every eight (8) hours as needed for Nausea., Disp: 20 Tablet, Rfl: 0    Omeprazole delayed release (PRILOSEC D/R) 20 mg tablet, Take 1 Tablet by mouth daily. (Patient not taking: Reported on 5/3/2022), Disp: 30 Tablet, Rfl: 0    phentermine (ADIPEX-P) 37.5 mg tablet, Take 1 Tablet by mouth every morning. Max Daily Amount: 37.5 mg., Disp: 30 Tablet, Rfl: 1    All:   Allergies   Allergen Reactions    Other Food Swelling     Allergy to jicama root       Social Hx:  Social History     Socioeconomic History    Marital status: SINGLE   Tobacco Use    Smoking status: Never    Smokeless tobacco: Never Vaping Use    Vaping Use: Never used   Substance and Sexual Activity    Alcohol use: Yes     Comment: occasionally    Drug use: No    Sexual activity: Yes     Partners: Male     Birth control/protection: Surgical       Family Hx:  Family History   Problem Relation Age of Onset    Breast Cancer Maternal Aunt         age unknown         Review of Systems:       General: Denies headache, lethargy, fever, weight loss  Ears/Nose/Throat: Denies ear discharge, drainage, nosebleeds, hoarse voice, dental problems  Cardiovascular: Denies chest pain, shortness of breath  Lungs: Denies chest pain, breathing problems, wheezing, pneumonia  Stomach: Denies stomach pain, heartburn, constipation, irritable bowel  Skin: Denies rash, sores, open wounds  Musculoskeletal: bilateral hip pain  Genitourinary: Denies dysuria, hematuria, polyuria  Gastrointestinal: Denies constipation, obstipation, diarrhea  Neurological: Denies changes in sight, smell, hearing, taste, seizures. Denies loss of consciousness. Psychiatric: Denies depression, sleep pattern changes, anxiety, change in personality  Endocrine: Denies mood swings, heat or cold intolerance  Hematologic/Lymphatic: Denies anemia, purpura, petechia  Allergic/Immunologic: Denies swelling of throat, pain or swelling at lymph nodes      Physical Examination:    Visit Vitals  /72   Pulse 94   Temp 97 °F (36.1 °C)   Ht 5' 5\" (1.651 m)   Wt 260 lb 12.8 oz (118.3 kg)   SpO2 94%   BMI 43.40 kg/m²        General: AOX3, no apparent distress  Psychiatric: mood and affect appropriate  Lungs: breathing is symmetric and unlabored bilaterally  Heart: regular rate and rhythm  Abdomen: no guarding  Head: normocephalic, atraumatic  Skin: No significant abnormalities, good turgor  Sensation intact to light touch: C5-T1 dermatomes  Muscular exam: 5/5 strength in all major muscle groups unless noted in specialty exam.    Extremities        left lower extremity:  No deformity is noted. Circumduction of the hip causes significant pain in the groin, reproductive of the chief complaint. Range of motion is limited, with a positive impingement sign. Gait is antalgic. Sensation is intact to light touch in the L1-S1 dermatomes. Skin is intact about the hip. Hip flexion and abduction strength is 4+/5, hip extension and knee extension as well as tibialis anterior and EHL/GCS are 5/5 strength.    right lower extremity: No deformity is noted. Circumduction of the hip causes significant pain in the groin, reproductive of the chief complaint. Range of motion is limited, with a positive impingement sign. Gait is antalgic. Sensation is intact to light touch in the L1-S1 dermatomes. Skin is intact about the hip. Hip flexion and abduction strength is 4+/5, hip extension and knee extension as well as tibialis anterior and EHL/GCS are 5/5 strength. Diagnostics:    Pertinent Diagnostics: none today    Assessment: Bilateral hip Osteoarthritis  Plan: This patient I had a long discussion regarding treatment options. We went over the nonoperative options, continued medications, injections, physical therapy, maintenance of ideal body weight. Patient has elected to proceed with continued weight loss, we have discussed that as long as she can get down to 250 pounds, I can schedule her with the intent that she will be at a BMI of 40 prior to surgery. She stated her understanding and satisfaction, she has prescription from me regarding pain control, hydrocodone prescriptions renewed recently. Follow-up in 1 month for weight check. Ms. Juju Angela has a reminder for a \"due or due soon\" health maintenance. I have asked that she contact her primary care provider for follow-up on this health maintenance.

## 2022-11-21 RX ORDER — DICLOFENAC SODIUM 75 MG/1
TABLET, DELAYED RELEASE ORAL
Qty: 60 TABLET | Refills: 0 | Status: SHIPPED | OUTPATIENT
Start: 2022-11-21 | End: 2022-11-22 | Stop reason: SDUPTHER

## 2022-11-22 RX ORDER — DICLOFENAC SODIUM 75 MG/1
TABLET, DELAYED RELEASE ORAL
Qty: 60 TABLET | Refills: 0 | Status: SHIPPED | OUTPATIENT
Start: 2022-11-22

## 2022-12-23 RX ORDER — DICLOFENAC SODIUM 75 MG/1
TABLET, DELAYED RELEASE ORAL
Qty: 60 TABLET | Refills: 0 | Status: SHIPPED | OUTPATIENT
Start: 2022-12-23

## 2023-01-10 ENCOUNTER — OFFICE VISIT (OUTPATIENT)
Dept: ORTHOPEDIC SURGERY | Age: 53
End: 2023-01-10
Payer: COMMERCIAL

## 2023-01-10 VITALS
HEIGHT: 65 IN | TEMPERATURE: 97.4 F | DIASTOLIC BLOOD PRESSURE: 76 MMHG | BODY MASS INDEX: 43.49 KG/M2 | HEART RATE: 91 BPM | SYSTOLIC BLOOD PRESSURE: 113 MMHG | WEIGHT: 261 LBS | OXYGEN SATURATION: 96 %

## 2023-01-10 DIAGNOSIS — M16.0 BILATERAL PRIMARY OSTEOARTHRITIS OF HIP: Primary | ICD-10-CM

## 2023-01-10 PROCEDURE — 99213 OFFICE O/P EST LOW 20 MIN: CPT | Performed by: ORTHOPAEDIC SURGERY

## 2023-01-10 NOTE — PROGRESS NOTES
Identified pt with two pt identifiers (name and ). Reviewed chart in preparation for visit and have obtained necessary documentation. Gail Rick is a 48 y.o. female  Chief Complaint   Patient presents with    Hip Pain     Bilateral Hip     Visit Vitals  /76 (BP 1 Location: Right arm, BP Patient Position: Sitting, BP Cuff Size: Large adult)   Pulse 91   Temp 97.4 °F (36.3 °C) (Tympanic)   Ht 5' 5\" (1.651 m)   Wt 261 lb (118.4 kg)   LMP 2011   SpO2 96%   BMI 43.43 kg/m²     1. Have you been to the ER, urgent care clinic since your last visit? Hospitalized since your last visit? No    2. Have you seen or consulted any other health care providers outside of the [address] [place] [address] since your last visit? Include any pap smears or colon screening.  No

## 2023-01-11 NOTE — PROGRESS NOTES
2023      CC: right hip pain    HPI:      This is a 48y.o. year old female who presents for a follow up visit. The patient was last seen and diagnosed with right hip osteoarthritis. The patient's treatments since the most recent visit have comprised of weight loss attempts. The patient has had no relief of the chief complaint. PMH:  Past Medical History:   Diagnosis Date    Spinal stenosis        PSxHx:  Past Surgical History:   Procedure Laterality Date    HX  SECTION      x2    HX GYN      HX HERNIA REPAIR  2021    Dr. Brown Au LAPAROSCOPIC SUPRACERVICAL HYSTERECTOMY      HX OTHER SURGICAL      jaw surgery    HX TUBAL LIGATION         Meds:    Current Outpatient Medications:     diclofenac EC (VOLTAREN) 75 mg EC tablet, TAKE 1 TABLET BY MOUTH TWO TIMES A DAY., Disp: 60 Tablet, Rfl: 0    famotidine (PEPCID) 20 mg tablet, TAKE 1 TABLET BY MOUTH TWO (2) TIMES A DAY FOR 14 DAYS. (Patient not taking: Reported on 5/3/2022), Disp: , Rfl:     cyclobenzaprine (FLEXERIL) 10 mg tablet, Take 1 Tablet by mouth three (3) times daily as needed for Muscle Spasm(s). , Disp: 40 Tablet, Rfl: 1    lidocaine (LIDODERM) 5 %, Apply patch to the affected area for 12 hours a day and remove for 12 hours a day., Disp: 30 Each, Rfl: 5    ondansetron hcl (Zofran) 4 mg tablet, Take 1 Tablet by mouth every eight (8) hours as needed for Nausea., Disp: 20 Tablet, Rfl: 0    Omeprazole delayed release (PRILOSEC D/R) 20 mg tablet, Take 1 Tablet by mouth daily. (Patient not taking: Reported on 5/3/2022), Disp: 30 Tablet, Rfl: 0    phentermine (ADIPEX-P) 37.5 mg tablet, Take 1 Tablet by mouth every morning. Max Daily Amount: 37.5 mg., Disp: 30 Tablet, Rfl: 1    All:   Allergies   Allergen Reactions    Other Food Swelling     Allergy to jicama root       Social Hx:  Social History     Socioeconomic History    Marital status: SINGLE   Tobacco Use    Smoking status: Never    Smokeless tobacco: Never   Vaping Use Vaping Use: Never used   Substance and Sexual Activity    Alcohol use: Yes     Comment: occasionally    Drug use: No    Sexual activity: Yes     Partners: Male     Birth control/protection: Surgical       Family Hx:  Family History   Problem Relation Age of Onset    Breast Cancer Maternal Aunt         age unknown         Review of Systems:       General: Denies headache, lethargy, fever, weight loss  Ears/Nose/Throat: Denies ear discharge, drainage, nosebleeds, hoarse voice, dental problems  Cardiovascular: Denies chest pain, shortness of breath  Lungs: Denies chest pain, breathing problems, wheezing, pneumonia  Stomach: Denies stomach pain, heartburn, constipation, irritable bowel  Skin: Denies rash, sores, open wounds  Musculoskeletal: right hip pain  Genitourinary: Denies dysuria, hematuria, polyuria  Gastrointestinal: Denies constipation, obstipation, diarrhea  Neurological: Denies changes in sight, smell, hearing, taste, seizures. Denies loss of consciousness.   Psychiatric: Denies depression, sleep pattern changes, anxiety, change in personality  Endocrine: Denies mood swings, heat or cold intolerance  Hematologic/Lymphatic: Denies anemia, purpura, petechia  Allergic/Immunologic: Denies swelling of throat, pain or swelling at lymph nodes      Physical Examination:    Visit Vitals  /76 (BP 1 Location: Right arm, BP Patient Position: Sitting, BP Cuff Size: Large adult)   Pulse 91   Temp 97.4 °F (36.3 °C) (Tympanic)   Ht 5' 5\" (1.651 m)   Wt 261 lb (118.4 kg)   SpO2 96%   BMI 43.43 kg/m²        General: AOX3, no apparent distress  Psychiatric: mood and affect appropriate  Lungs: breathing is symmetric and unlabored bilaterally  Heart: regular rate and rhythm  Abdomen: no guarding  Head: normocephalic, atraumatic  Skin: No significant abnormalities, good turgor  Sensation intact to light touch: C5-T1 dermatomes  Muscular exam: 5/5 strength in all major muscle groups unless noted in specialty exam.    Extremities        left lower extremity:  No gross deformity. No restriction to range of motion of the hip, knee, ankle. Muscle bulk is appropriate without wasting. Sensation is intact to light touch in the L1-S1 dermatomes. Capillary refill is less than 2 seconds in the fingers. Strength testing is 5/5 at the major muscle groups of the hip, knee, ankle. right lower extremity: No deformity is noted. Circumduction of the hip causes significant pain in the groin, reproductive of the chief complaint. Range of motion is limited, with a positive impingement sign. Gait is antalgic. Sensation is intact to light touch in the L1-S1 dermatomes. Skin is intact about the hip. Hip flexion and abduction strength is 4+/5, hip extension and knee extension as well as tibialis anterior and EHL/GCS are 5/5 strength. Diagnostics:    Pertinent Diagnostics: none today    Assessment: right hip Osteoarthritis  Plan: This patient I had a long discussion regarding treatment options. We went over the nonoperative options, continued medications, injections, physical therapy, maintenance of ideal body weight. Patient has elected to proceed with aggressive weight loss, with weight loss referral. I have emphasized the importance of weight loss prior to surgery to prevent higher risks of infection. Follow up 4-6 weeks with no xrays. Time in face to face consult: 20 minutes      Ms. Nazario has a reminder for a \"due or due soon\" health maintenance. I have asked that she contact her primary care provider for follow-up on this health maintenance.

## 2023-01-16 ENCOUNTER — TELEPHONE (OUTPATIENT)
Dept: ORTHOPEDIC SURGERY | Age: 53
End: 2023-01-16

## 2023-01-16 NOTE — TELEPHONE ENCOUNTER
Patient called in to check on the status of her short term disability paperwork. Patient was informed it was still being worked on and the turn around for all paperwork is 14 business days. It was also explained to the patient that she will receive a phone call when the documents are completed and for disability paperwork there is a $35.00 charge that will need to be collected. Patient stated her understanding and asked to come in to pay in cash. Patient has [employer] care insurance through her employer.

## 2023-01-18 ENCOUNTER — VIRTUAL VISIT (OUTPATIENT)
Dept: FAMILY MEDICINE CLINIC | Age: 53
End: 2023-01-18
Payer: COMMERCIAL

## 2023-01-18 ENCOUNTER — TELEPHONE (OUTPATIENT)
Dept: FAMILY MEDICINE CLINIC | Age: 53
End: 2023-01-18

## 2023-01-18 DIAGNOSIS — R73.03 PREDIABETES: Primary | ICD-10-CM

## 2023-01-18 DIAGNOSIS — M16.0 BILATERAL PRIMARY OSTEOARTHRITIS OF HIP: ICD-10-CM

## 2023-01-18 DIAGNOSIS — E66.01 OBESITY, MORBID (HCC): Primary | ICD-10-CM

## 2023-01-18 PROBLEM — K56.7 POSTOPERATIVE ILEUS (HCC): Status: RESOLVED | Noted: 2021-09-16 | Resolved: 2023-01-18

## 2023-01-18 PROBLEM — K42.0 UMBILICAL HERNIA WITH OBSTRUCTION, WITHOUT GANGRENE: Status: RESOLVED | Noted: 2021-09-11 | Resolved: 2023-01-18

## 2023-01-18 PROBLEM — K42.0 UMBILICAL HERNIA WITH OBSTRUCTION: Status: RESOLVED | Noted: 2021-09-11 | Resolved: 2023-01-18

## 2023-01-18 PROBLEM — K91.89 POSTOPERATIVE ILEUS (HCC): Status: RESOLVED | Noted: 2021-09-16 | Resolved: 2023-01-18

## 2023-01-18 RX ORDER — SEMAGLUTIDE 0.25 MG/.5ML
0.25 INJECTION, SOLUTION SUBCUTANEOUS
Qty: 4 EACH | Refills: 0 | Status: SHIPPED | OUTPATIENT
Start: 2023-01-18 | End: 2023-01-19

## 2023-01-18 NOTE — PROGRESS NOTES
Chief Complaint   Patient presents with    Weight Management       1. \"Have you been to the ER, urgent care clinic since your last visit? Hospitalized since your last visit? \" No    2. \"Have you seen or consulted any other health care providers outside of the [address] [place] [address] since your last visit? \" Yes When: Dr Kirk Whitlock orthoped[facility]      3. For patients aged 39-70: Has the patient had a colonoscopy / FIT/ Cologuard? No      If the patient is female:    4. For patients aged 41-77: Has the patient had a mammogram within the past 2 years? No      5. For patients aged 21-65: Has the patient had a pap smear? No    Pt is here for VV today to discuss options for weight loss. She needs to lose 15 pounds in order to have hip surgery. She has lost approx 30 pounds on her own.      Health Maintenance Due   Topic Date Due    Hepatitis C Screening  Never done    Cervical cancer screen  04/29/2016    Colorectal Cancer Screening Combo  Never done    COVID-19 Vaccine (4 - Booster for Pfizer series) 12/04/2021    Breast Cancer Screen Mammogram  01/22/2022    Shingles Vaccine (2 of 2) 06/28/2022    Flu Vaccine (1) 08/01/2022

## 2023-01-18 NOTE — TELEPHONE ENCOUNTER
Spoke to pharmacy - prior authorization is needed. Submitted PA on covermymeds, currently waiting on determination.

## 2023-01-18 NOTE — TELEPHONE ENCOUNTER
Patient states the Ins will not cover the medication semaglutide, weight loss, (Wegovy) 0.25 mg/0.5 mL pnij.   Please give her a call @ [phone]

## 2023-01-18 NOTE — PROGRESS NOTES
Andrei Garrido (: 1970) is a 48 y.o. female, established patient, here for evaluation of the following chief complaint(s):   Weight Management       ASSESSMENT/PLAN:  Below is the assessment and plan developed based on review of pertinent history, labs, studies, and medications. 1. Obesity, morbid ([address] - patient is only eating one meal daily, following keto diet, occasional evening snack of fruit. Doing intermittent fasting as well. She has lost 25-30 pounds on own. Most weight loss medications work on the principal of appetite suppressant, which she does not have an appetite, does not crave foods or feel hungry. We can trial [place] if insurance covers. Patient encouraged to eat a little more some days, healthy proteins and carbs to help increase metabolism. Encouraged her to get stretch bands, dumbbells or light ankle weights to work both upper and lower body muscle groups. Patient to weight self at home and report weight in MyChart in 3-4 weeks. -     semaglutide, weight loss, (Wegovy) 0.25 mg/0.5 mL pnij; 0.25 mg by SubCUTAneous route every seven (7) days. Indications: weight loss management for an obese person, Normal, Disp-4 Each, R-0  2. Bilateral primary osteoarthritis of hip - per ortho. Needs THR      SUBJECTIVE/OBJECTIVE:  HPI  Pt is here for VV today to discuss options for weight loss. She needs to lose 15 pounds in order to have hip surgery. She has lost approx 25-30 pounds on her own. Saw Dr. Annel Carcamo for hip pain 1 week ago, told her to get surgery and reduce complications, she needs to lose 10-15 pounds,  Doing keto diet now. Only eats 1 meal daily, does intermittent fasting. Does not get hungry during the day,  She is sedentary due to hip pain- walks with walker. Feels likes she has some things working against her being over 50, full hysterectomy, pain preventing exercise. Drinks water, 1 cup coffee in AM.  No sodas, juices.   If she needs a snack in evening, may eat fruit, usually berries since they have lower carbs. Allergies   Allergen Reactions    Other Food Swelling     Allergy to jicama root       Past Medical History:   Diagnosis Date    Spinal stenosis 2016       Past Surgical History:   Procedure Laterality Date    HX  SECTION      x2    HX GYN      HX HERNIA REPAIR  2021    Dr. Noble Caorlina LAPAROSCOPIC SUPRACERVICAL HYSTERECTOMY  2013    HX OTHER SURGICAL      jaw surgery    HX TUBAL LIGATION         Social History     Socioeconomic History    Marital status: SINGLE     Spouse name: Not on file    Number of children: Not on file    Years of education: Not on file    Highest education level: Not on file   Occupational History    Not on file   Tobacco Use    Smoking status: Never    Smokeless tobacco: Never   Vaping Use    Vaping Use: Never used   Substance and Sexual Activity    Alcohol use: Yes     Comment: occasionally    Drug use: No    Sexual activity: Yes     Partners: Male     Birth control/protection: Surgical   Other Topics Concern    Not on file   Social History Narrative    Not on file     Social Determinants of Health     Financial Resource Strain: Not on file   Food Insecurity: Not on file   Transportation Needs: Not on file   Physical Activity: Not on file   Stress: Not on file   Social Connections: Not on file   Intimate Partner Violence: Not on file   Housing Stability: Not on file       Family History   Problem Relation Age of Onset    Breast Cancer Maternal Aunt         age unknown       Current Outpatient Medications   Medication Sig    semaglutide, weight loss, (Wegovy) 0.25 mg/0.5 mL pnij 0.25 mg by SubCUTAneous route every seven (7) days. Indications: weight loss management for an obese person    diclofenac EC (VOLTAREN) 75 mg EC tablet TAKE 1 TABLET BY MOUTH TWO TIMES A DAY. No current facility-administered medications for this visit. Review of Systems   Constitutional: Negative. Respiratory: Negative.      Cardiovascular: Negative. Gastrointestinal: Negative. Genitourinary: Negative. Musculoskeletal:  Positive for arthralgias (hip pain). No data recorded     Physical Exam  Constitutional: [x] Appears well-developed and well-nourished [x] No apparent distress  - laying in bed    Mental status: [x] Alert and awake  [x] Oriented to person/place/time [x] Able to follow commands      Neck: [x] No visualized mass    Pulmonary/Chest: [x] Respiratory effort normal   [x] No visualized signs of difficulty breathing or respiratory distresS     Musculoskeletal:   [x] Normal range of motion of neck    Neurological:        [x] No Facial Asymmetry (Cranial nerve 7 motor function) (limited exam due to video visit)           Skin:        [x] No significant exanthematous lesions or discoloration noted on facial skin            Psychiatric:       [x] Normal Affect       [x] No Hallucinations      On this date 01/18/2023 I have spent 25 minutes reviewing previous notes, test results and face to face (virtual) with the patient discussing the diagnosis and importance of compliance with the treatment plan as well as documenting on the day of the visit. [facility], was evaluated through a synchronous (real-time) audio-video encounter. The patient (or guardian if applicable) is aware that this is a billable service, which includes applicable co-pays. This Virtual Visit was conducted with patient's (and/or legal guardian's) consent. The visit was conducted pursuant to the emergency declaration under the [address] [place] [address], [address] [place] [address] authority and the [employer] and [employer] Act. Patient identification was verified, and a caregiver was present when appropriate. The patient was located at: Home: [address] [address]  The provider was located at:  Facility (Appt Department): [address] [place] [phone]       An electronic signature was used to authenticate this note.   -- Yari Paez, NP

## 2023-01-19 NOTE — TELEPHONE ENCOUNTER
Ozempic prescription sent to pharmacy since she has history of prediabetes.   Not sure if will require step authorization (which usually means trialing metformin)    Orders Placed This Encounter    semaglutide (OZEMPIC) 0.25 mg or 0.5 mg/dose (2 mg/1.5 ml) subq pen     Sig: Inject SQ 0.25 mg once weekly for 4 weeks then increase to 0.5 mg once weekly     Dispense:  1 Box     Refill:  1

## 2023-01-19 NOTE — TELEPHONE ENCOUNTER
PA denied - pt said her plan does cover Ozempic, asked that we send that in if [facility][place] was not covered.

## 2023-01-20 ENCOUNTER — TELEPHONE (OUTPATIENT)
Dept: FAMILY MEDICINE CLINIC | Age: 53
End: 2023-01-20

## 2023-01-20 DIAGNOSIS — R73.03 PREDIABETES: Primary | ICD-10-CM

## 2023-01-20 NOTE — TELEPHONE ENCOUNTER
Patient called wanting to inform Mary Schroeder that with her insurance the rx forsemaglutide (OZEMPIC) 0.25 mg or 0.5 mg/dose (2 mg/1.5 ml) subq pen   Requires a PA. She requested a call back to discuss what can be done so she can get the rx today. She can be reached at [phone].

## 2023-01-20 NOTE — TELEPHONE ENCOUNTER
PA required for Ozempic. Submitted on covermymeds. Made pt aware that a trial of Metformin would be needed if PA is denied.

## 2023-01-24 RX ORDER — METFORMIN HYDROCHLORIDE 500 MG/1
500 TABLET, EXTENDED RELEASE ORAL
Qty: 30 TABLET | Refills: 0 | Status: SHIPPED | OUTPATIENT
Start: 2023-01-24

## 2023-01-24 NOTE — TELEPHONE ENCOUNTER
Sent order for metformin. If she has any side effects with metformin (such as nausea, diarrhea, abd pain), let me know.   If she has side effects, can resend Ozempic for PA You are weight bearing as tolerated on your left leg.  No strenuous activity, heavy lifting, driving or returning to work until cleared by MD.  You may shower - dressing is water-resistant, no soaking in bathtubs.  Remove dressing after post op day 5-7, then leave incision open to air. Keep incision clean and dry.  Try to have regular bowel movements, take stool softener or laxative if necessary.  May take Pepcid or Zantac for upset stomach.  May take Aleve or Naproxen instead of Meloxicam.  Swelling may travel all the way down leg to foot, this is normal and will subside in a few weeks.  Call to schedule an appt with Dr. Cruz for follow up, if you have staples or sutures they will be removed in office.  Contact your doctor if you experience: fever greater than 101.5, chills, chest pain, difficulty breathing, redness or excessive drainage around the incision, other concerns.     Please refer to Dr. Cruz's separate information packet in regards to pain medications. Improvement after surgery.

## 2023-02-01 ENCOUNTER — TELEPHONE (OUTPATIENT)
Dept: FAMILY MEDICINE CLINIC | Age: 53
End: 2023-02-01

## 2023-02-01 NOTE — TELEPHONE ENCOUNTER
Verified patient with two type of identifiers. Spoke to pt - she is having unpleasant side effects from the Metformin. She is having frequent diarrhea. Requesting to try the Ozempic again. She is also asking for a letter stating she has an intolerance to the Metformin so her insurance can be made aware.

## 2023-02-01 NOTE — TELEPHONE ENCOUNTER
Patient states that she would like to get a call from Jaun Schaumann regarding her medication she can be reached @ [phone]

## 2023-02-01 NOTE — TELEPHONE ENCOUNTER
Submit a PA for Ozempic indicating patient has tried metformin and had side effects. Let me know if the PA does not go through.     Her Ozempic prescription should still be active at pharmacy

## 2023-02-03 NOTE — TELEPHONE ENCOUNTER
Let patient know that [place] was denied. I have spoken with patient at virtual visit that most weight loss medications work on principal of suppressing appetite, and she only eats once daily, does not have appetite or cravings. I would recommend she continue with intermittent fasting, and using dumbbells or stretch bands to light weight training.   Hopefully she will continue to lose weight as she has been doing

## 2023-02-03 NOTE — TELEPHONE ENCOUNTER
PA denied. Denial stated: This medicine is covered only if:  You have a diagnosis of type 2 diabetes mellitus confirmed by accepted laboratory testing  methodologies per treatment guidelines (for example, A1C greater than or equal to 6.5%, fasting  plasma glucose greater than or equal to 126mg/dL and/or 2-hour plasma glucose greater than or equal  to 200mg/dL).

## 2023-02-08 ENCOUNTER — OFFICE VISIT (OUTPATIENT)
Dept: ORTHOPEDIC SURGERY | Age: 53
End: 2023-02-08
Payer: COMMERCIAL

## 2023-02-08 VITALS
BODY MASS INDEX: 42.99 KG/M2 | TEMPERATURE: 98.3 F | SYSTOLIC BLOOD PRESSURE: 112 MMHG | DIASTOLIC BLOOD PRESSURE: 75 MMHG | HEART RATE: 84 BPM | WEIGHT: 258 LBS | OXYGEN SATURATION: 93 % | HEIGHT: 65 IN

## 2023-02-08 DIAGNOSIS — M16.0 BILATERAL PRIMARY OSTEOARTHRITIS OF HIP: Primary | ICD-10-CM

## 2023-02-08 PROCEDURE — 99213 OFFICE O/P EST LOW 20 MIN: CPT | Performed by: ORTHOPAEDIC SURGERY

## 2023-02-08 NOTE — PROGRESS NOTES
2/8/2023    Chief Complaint: Right hip pain    Assessment: Unilateral primary osteoarthritis of the right hip  (M16.11)     Plan: This patient and I did have a long discussion regarding the treatment options. Nonoperative management was discussed at length, continued pain control, physical therapy, injections, ambulatory aids, and weight loss. I did speak with the patient specifically that there are always some nonoperative options, and that surgery would be elective on their part. We did discuss the risks of surgery which include but are not limited to infection, nerve or blood vessel damage, femoral, lateral femoral cutaneous nerve injury, sciatic nerve injury, failure of fixation, failure of any possible implant, need for reoperation, postoperative pain and swelling, intra-or postoperative fracture, postoperative dislocation, leg length inequality, need for reoperation, implant failure, death, disability, organ dysfunction, wound healing issues, DVT, PE, and the need for further procedures. The patient did freely state their understanding and satisfaction with our discussion. We will proceed with a total hip arthroplasty after another 18 lbs weight loss and medical clearances. The patient was counseled at length about the risks of jeremie Covid-19 during their perioperative period and any recovery window from their procedure. The patient was made aware that jeremie Covid-19  may worsen their prognosis for recovering from their procedure and lend to a higher morbidity and/or mortality risk. All material risks, benefits, and reasonable alternatives including postponing the procedure were discussed. The patient does  wish to proceed with the procedure at this time. HPI: This is a 48 y. o.female who complains of right hip pain which is activity dependent. The patient has had activity dependent pain for years.     The patient has tried activity modification, no physical therapy, injections have failed. The pain is in the groin , it is severe in intensity. The patient fears falling, walks with a limp, and feels as though all nonoperative management has failed. The patient denies any numbness, weakness, tingling, fevers, chills, nausea, vomiting, fevers, chills, nausea, vomiting. Past Medical History:   Diagnosis Date    Spinal stenosis        Past Surgical History:   Procedure Laterality Date    HX  SECTION      x2    HX GYN      HX HERNIA REPAIR  2021    Dr. Noble Carolina LAPAROSCOPIC SUPRACERVICAL HYSTERECTOMY  2013    HX OTHER SURGICAL      jaw surgery    HX TUBAL LIGATION         Current Outpatient Medications on File Prior to Visit   Medication Sig Dispense Refill    metFORMIN ER (GLUCOPHAGE XR) 500 mg tablet Take 1 Tablet by mouth daily (with dinner). 30 Tablet 0    diclofenac EC (VOLTAREN) 75 mg EC tablet TAKE 1 TABLET BY MOUTH TWO TIMES A DAY. 60 Tablet 0    semaglutide (OZEMPIC) 0.25 mg or 0.5 mg/dose (2 mg/1.5 ml) subq pen Inject SQ 0.25 mg once weekly for 4 weeks then increase to 0.5 mg once weekly (Patient not taking: Reported on 2023) 1 Box 1     No current facility-administered medications on file prior to visit.        Allergies   Allergen Reactions    Other Food Swelling     Allergy to jicama root       Family History   Problem Relation Age of Onset    Breast Cancer Maternal Aunt         age unknown       Social History     Socioeconomic History    Marital status: SINGLE   Tobacco Use    Smoking status: Never    Smokeless tobacco: Never   Vaping Use    Vaping Use: Never used   Substance and Sexual Activity    Alcohol use: Yes     Comment: occasionally    Drug use: No    Sexual activity: Yes     Partners: Male     Birth control/protection: Surgical         Review of Systems:       General: Denies headache, lethargy, fever, weight loss  Ears/Nose/Throat: Denies ear discharge, drainage, nosebleeds, hoarse voice, dental problems  Cardiovascular: Denies chest pain, shortness of breath  Lungs: Denies chest pain, breathing problems, wheezing, pneumonia  Stomach: Denies stomach pain, heartburn, constipation, irritable bowel  Skin: Denies rash, sores, open wounds  Musculoskeletal: Admits to right hip pain  Genitourinary: Denies dysuria, hematuria, polyuria  Gastrointestinal: Denies constipation, obstipation, diarrhea  Neurological: Denies changes in sight, smell, hearing, taste, seizures. Denies loss of consciousness. Psychiatric: Denies depression, sleep pattern changes, anxiety, change in personality  Endocrine: Denies mood swings, heat or cold intolerance  Hematologic/Lymphatic: Denies anemia, purpura, petechia  Allergic/Immunologic: Denies swelling of throat, pain or swelling at lymph nodes      Physical Examination:      General: AOX3, no apparent distress  Psychiatric: mood and affect appropriate  Lungs: clear to auscultation bilaterally  Heart: regular rate and rhythm  Abdomen: no guarding  Head: normocephalic, atraumatic  Skin: No significant abnormalities, good turgor  Sensation intact to light touch: L1-S1 dermatomes  Muscular exam: 5/5 strength in all major muscle groups unless noted in specialty exam.    Extremities      Left upper extremity: Full active and passive range of motion without pain, deformity, no open wound, strength 5/5 in all major muscle groups. Right upper extremity: Full active and passive range of motion without pain, deformity, no open wound, strength 5/5 in all major muscle groups. Left lower extremity: Full active and passive range of motion without pain, deformity, no open wound, strength 5/5 in all major muscle groups. Right lower extremity:  No deformity is noted. Circumduction of the hip causes significant pain in the groin, reproductive of the chief complaint. Range of motion is limited, with a positive impingement sign. YULIA test causes some pain in the groin. Gait is antalgic.   Slight tenderness to palpation on the lateral aspect of the hip. Sensation is intact to light touch in the L1-S1 dermatomes. Skin is intact about the hip. Hip flexion and abduction strength is 4+/5, hip extension and knee extension as well as tibialis anterior and EHL/GCS are 5/5 strength. Diagnostics:    Pertinent Xrays:  Pelvis and hip xrays indicate severe osteoarthritis of the right hip. There are osteophytes at the femoral neck and head, as well as subchondral sclerosis of the acetabular rim. Ms. Nazario has a reminder for a \"due or due soon\" health maintenance. I have asked that she contact her primary care provider for follow-up on this health maintenance.

## 2023-02-08 NOTE — PROGRESS NOTES
Identified pt with two pt identifiers (name and ). Reviewed chart in preparation for visit and have obtained necessary documentation. Jordan Mack is a 48 y.o. female  Chief Complaint   Patient presents with    Hip Pain     Carl Hip     Visit Vitals  /75 (BP 1 Location: Right upper arm, BP Patient Position: Sitting, BP Cuff Size: Large adult)   Pulse 84   Temp 98.3 °F (36.8 °C) (Tympanic)   Ht 5' 5\" (1.651 m)   Wt 258 lb (117 kg)   LMP 2011   SpO2 93%   BMI 42.93 kg/m²     1. Have you been to the ER, urgent care clinic since your last visit? Hospitalized since your last visit? No    2. Have you seen or consulted any other health care providers outside of the [address] [place] [address] since your last visit? Include any pap smears or colon screening.  No

## 2023-02-13 ENCOUNTER — TELEPHONE (OUTPATIENT)
Dept: ORTHOPEDIC SURGERY | Age: 53
End: 2023-02-13

## 2023-02-13 NOTE — TELEPHONE ENCOUNTER
Contacted patient to schedule surgery. Scheduled for 3/30/23. Advised patient that clearances from Deon Perry NP would be required, and would need to be received no less than 2 business days before surgery. Patient advised to contact the office(s) to make pre op appts as soon as possible. Patient verbalized understanding and was encouraged to contact our office with any questions or concerns regarding upcoming surgery or required clearances. Clearance letters faxed to [phone]. Confirmation was received.           ----- Message from Brad Atwood DO sent at 2/8/2023  3:47 PM EST -----  Diagnosis: Unilateral Primary Osteoarthritis, right hip (M16.1)    Procedure: Right total hip arthroplasty  CPT: 06015  Operative minutes: 100  Inpatient  Location: [facility] Main OR  PAT: Yes  Class: Yes  Special Equipment: C arm (12 inch), HANA table, Direct Anterior total Hip, Digna Accolade II  Staffing: Assistant/retractor [email or web address]  Anesthesia: spinal      6 weeks from now to allow time to lose weight.

## 2023-02-19 DIAGNOSIS — R73.03 PREDIABETES: ICD-10-CM

## 2023-02-20 ENCOUNTER — TELEPHONE (OUTPATIENT)
Dept: FAMILY MEDICINE CLINIC | Age: 53
End: 2023-02-20

## 2023-02-20 DIAGNOSIS — R73.03 PREDIABETES: ICD-10-CM

## 2023-02-20 RX ORDER — METFORMIN HYDROCHLORIDE 500 MG/1
500 TABLET, EXTENDED RELEASE ORAL
Qty: 30 TABLET | Refills: 0 | OUTPATIENT
Start: 2023-02-20

## 2023-02-20 NOTE — TELEPHONE ENCOUNTER
Recently denied - med discontinued      For Pharmacy Admin Tracking Only    Program: Medication Refill  CPA in place:   Recommendation Provided To:    Intervention Detail: Discontinued Rx: 1, reason: Therapy Complete  Intervention Accepted By:   Mike Heart Closed?:   Time Spent (min): 5

## 2023-02-21 RX ORDER — METFORMIN HYDROCHLORIDE 500 MG/1
500 TABLET, EXTENDED RELEASE ORAL
Qty: 90 TABLET | Refills: 1 | Status: SHIPPED | OUTPATIENT
Start: 2023-02-21

## 2023-02-21 NOTE — TELEPHONE ENCOUNTER
Orders Placed This Encounter    metFORMIN ER (GLUCOPHAGE XR) 500 mg tablet     Sig: Take 1 Tablet by mouth daily (with dinner).      Dispense:  90 Tablet     Refill:  1

## 2023-02-21 NOTE — TELEPHONE ENCOUNTER
Verified patient with two type of identifiers. Spoke to pt - she would like to continue taking the Metformin but the refill was declined. When I spoke to her regarding the Maru Yeh being denied she stated she would like to continue the Metformin as it will help with her prediabetes.  Please advise

## 2023-02-27 RX ORDER — DICLOFENAC SODIUM 75 MG/1
TABLET, DELAYED RELEASE ORAL
Qty: 60 TABLET | Refills: 0 | Status: SHIPPED | OUTPATIENT
Start: 2023-02-27

## 2023-03-07 ENCOUNTER — TELEPHONE (OUTPATIENT)
Dept: ORTHOPEDIC SURGERY | Age: 53
End: 2023-03-07

## 2023-03-07 DIAGNOSIS — M16.0 BILATERAL PRIMARY OSTEOARTHRITIS OF HIP: ICD-10-CM

## 2023-03-07 RX ORDER — HYDROCODONE BITARTRATE AND ACETAMINOPHEN 5; 325 MG/1; MG/1
1 TABLET ORAL
Qty: 42 TABLET | Refills: 0 | Status: SHIPPED | OUTPATIENT
Start: 2023-03-07 | End: 2023-03-10 | Stop reason: SDUPTHER

## 2023-03-07 NOTE — TELEPHONE ENCOUNTER
Patient is requesting a return phone call from [facility] to discuss a question she has about her upcoming surgery and the weight she needs to loose.  The patient can be reached at [phone]

## 2023-03-07 NOTE — TELEPHONE ENCOUNTER
Spoke with pt and informed her to keep working at loosing the weight she needs to and we will see her for surgery.

## 2023-03-08 ENCOUNTER — TELEPHONE (OUTPATIENT)
Dept: ORTHOPEDIC SURGERY | Age: 53
End: 2023-03-08

## 2023-03-08 NOTE — TELEPHONE ENCOUNTER
Patient's pharmacy currently has the prescribed Hydrocodone-acetaminophen on backorder.  The patient is requesting to have the prescription sent into [facility]

## 2023-03-10 DIAGNOSIS — M16.0 BILATERAL PRIMARY OSTEOARTHRITIS OF HIP: ICD-10-CM

## 2023-03-10 RX ORDER — HYDROCODONE BITARTRATE AND ACETAMINOPHEN 5; 325 MG/1; MG/1
1 TABLET ORAL
Qty: 42 TABLET | Refills: 0 | Status: SHIPPED | OUTPATIENT
Start: 2023-03-10 | End: 2023-03-17

## 2023-03-21 ENCOUNTER — HOSPITAL ENCOUNTER (OUTPATIENT)
Dept: PREADMISSION TESTING | Age: 53
Discharge: HOME | End: 2023-03-21
Attending: ORTHOPAEDIC SURGERY
Payer: COMMERCIAL

## 2023-03-21 ENCOUNTER — TELEPHONE (OUTPATIENT)
Dept: ORTHOPEDIC SURGERY | Age: 53
End: 2023-03-21

## 2023-03-21 VITALS
HEIGHT: 63 IN | BODY MASS INDEX: 45.66 KG/M2 | RESPIRATION RATE: 15 BRPM | HEART RATE: 79 BPM | WEIGHT: 257.72 LBS | SYSTOLIC BLOOD PRESSURE: 125 MMHG | OXYGEN SATURATION: 98 % | TEMPERATURE: 97.6 F | DIASTOLIC BLOOD PRESSURE: 59 MMHG

## 2023-03-21 LAB
ABO + RH BLD: NORMAL
ALBUMIN SERPL-MCNC: 3.8 G/DL (ref 3.5–5)
ALBUMIN/GLOB SERPL: 1 (ref 1.1–2.2)
ALP SERPL-CCNC: 68 U/L (ref 45–117)
ALT SERPL-CCNC: 60 U/L (ref 12–78)
ANION GAP SERPL CALC-SCNC: 5 MMOL/L (ref 5–15)
APPEARANCE UR: ABNORMAL
AST SERPL-CCNC: 26 U/L (ref 15–37)
BACTERIA URNS QL MICRO: ABNORMAL /HPF
BILIRUB SERPL-MCNC: 0.5 MG/DL (ref 0.2–1)
BILIRUB UR QL: NEGATIVE
BLOOD GROUP ANTIBODIES SERPL: NORMAL
BUN SERPL-MCNC: 13 MG/DL (ref 6–20)
BUN/CREAT SERPL: 17 (ref 12–20)
CALCIUM SERPL-MCNC: 9.6 MG/DL (ref 8.5–10.1)
CAOX CRY URNS QL MICRO: ABNORMAL
CHLORIDE SERPL-SCNC: 112 MMOL/L (ref 97–108)
CO2 SERPL-SCNC: 24 MMOL/L (ref 21–32)
COLOR UR: ABNORMAL
CREAT SERPL-MCNC: 0.76 MG/DL (ref 0.55–1.02)
EPITH CASTS URNS QL MICRO: ABNORMAL /LPF
ERYTHROCYTE [DISTWIDTH] IN BLOOD BY AUTOMATED COUNT: 12.3 % (ref 11.5–14.5)
EST. AVERAGE GLUCOSE BLD GHB EST-MCNC: 114 MG/DL
GLOBULIN SER CALC-MCNC: 3.7 G/DL (ref 2–4)
GLUCOSE SERPL-MCNC: 108 MG/DL (ref 65–100)
GLUCOSE UR STRIP.AUTO-MCNC: NEGATIVE MG/DL
HBA1C MFR BLD: 5.6 % (ref 4–5.6)
HCT VFR BLD AUTO: 42.5 % (ref 35–47)
HGB BLD-MCNC: 14.3 G/DL (ref 11.5–16)
HGB UR QL STRIP: ABNORMAL
INR PPP: 1 (ref 0.9–1.1)
KETONES UR QL STRIP.AUTO: ABNORMAL MG/DL
LEUKOCYTE ESTERASE UR QL STRIP.AUTO: NEGATIVE
MCH RBC QN AUTO: 33.5 PG (ref 26–34)
MCHC RBC AUTO-ENTMCNC: 33.6 G/DL (ref 30–36.5)
MCV RBC AUTO: 99.5 FL (ref 80–99)
NITRITE UR QL STRIP.AUTO: NEGATIVE
NRBC # BLD: 0 K/UL (ref 0–0.01)
NRBC BLD-RTO: 0 PER 100 WBC
PH UR STRIP: 5 (ref 5–8)
PLATELET # BLD AUTO: 247 K/UL (ref 150–400)
PMV BLD AUTO: 11.2 FL (ref 8.9–12.9)
POTASSIUM SERPL-SCNC: 3.7 MMOL/L (ref 3.5–5.1)
PROT SERPL-MCNC: 7.5 G/DL (ref 6.4–8.2)
PROT UR STRIP-MCNC: ABNORMAL MG/DL
PROTHROMBIN TIME: 10.7 SEC (ref 9–11.1)
RBC # BLD AUTO: 4.27 M/UL (ref 3.8–5.2)
RBC #/AREA URNS HPF: ABNORMAL /HPF (ref 0–5)
SODIUM SERPL-SCNC: 141 MMOL/L (ref 136–145)
SPECIMEN EXP DATE BLD: NORMAL
UA: UC IF INDICATED,UAUC: ABNORMAL
UROBILINOGEN UR QL STRIP.AUTO: 1 EU/DL (ref 0.2–1)
WBC # BLD AUTO: 8.3 K/UL (ref 3.6–11)
WBC URNS QL MICRO: ABNORMAL /HPF (ref 0–4)

## 2023-03-21 PROCEDURE — 97161 PT EVAL LOW COMPLEX 20 MIN: CPT

## 2023-03-21 PROCEDURE — 83036 HEMOGLOBIN GLYCOSYLATED A1C: CPT

## 2023-03-21 PROCEDURE — 80053 COMPREHEN METABOLIC PANEL: CPT

## 2023-03-21 PROCEDURE — 36415 COLL VENOUS BLD VENIPUNCTURE: CPT

## 2023-03-21 PROCEDURE — 97116 GAIT TRAINING THERAPY: CPT

## 2023-03-21 PROCEDURE — 87086 URINE CULTURE/COLONY COUNT: CPT

## 2023-03-21 PROCEDURE — 85610 PROTHROMBIN TIME: CPT

## 2023-03-21 PROCEDURE — 81001 URINALYSIS AUTO W/SCOPE: CPT

## 2023-03-21 PROCEDURE — 86900 BLOOD TYPING SEROLOGIC ABO: CPT

## 2023-03-21 PROCEDURE — 85027 COMPLETE CBC AUTOMATED: CPT

## 2023-03-21 RX ORDER — HYDROCODONE BITARTRATE AND ACETAMINOPHEN 5; 325 MG/1; MG/1
1 TABLET ORAL
COMMUNITY
End: 2023-03-24

## 2023-03-21 RX ORDER — LANOLIN ALCOHOL/MO/W.PET/CERES
1000 CREAM (GRAM) TOPICAL DAILY
COMMUNITY

## 2023-03-21 RX ORDER — MULTIVITAMIN
1 TABLET ORAL DAILY
COMMUNITY

## 2023-03-21 RX ORDER — DEXTROMETHORPHAN HYDROBROMIDE, GUAIFENESIN 5; 100 MG/5ML; MG/5ML
650 LIQUID ORAL EVERY 8 HOURS
COMMUNITY

## 2023-03-21 NOTE — PROGRESS NOTES
[facility] [place]  [address] [facility], [address] [facility]    PHYSICAL THERAPY PRE THR SURGERY EVALUATION  Patient: Juliann Farley (78 y.o. female)  Date: 3/21/2023  Primary Diagnosis: pat  Procedure(s) (LRB):  RIGHT TOTAL HIP ARTHROPLASTY (Right)     Precautions:        ASSESSMENT :  Based on the objective data described below, the patient presents with impaired gait, balance, pain, and overall impaired functional mobility due to end stage degenerative joint disease in the right hip. Discussed anticipated disposition to home with possible discharge within a 1 to 2 day time frame post-surgery. Patient in agreement. GOALS: (Goals have been discussed and agreed upon with patient.)  DISCHARGE GOALS: Time Frame: 1 DAY  Patient will demonstrate increased strength, range of motion, and pain control via a home exercise program in order to minimize functional deficits in preparation for their upcoming surgery. This will be achieved by using education, demonstration and through the use of an informational handout including a home exercise program.  REHABILITATION POTENTIAL FOR STATED GOALS: Good     RECOMMENDATIONS AND PLANNED INTERVENTIONS: (Benefits and precautions of physical therapy have been discussed with the patient.)  Home Exercise Program  TREATMENT PLAN EFFECTIVE DATES: 3/21/2023 TO 3/21/2023  FREQUENCY/DURATION: Patient to continue to perform home exercise program at least twice daily until her surgery. SUBJECTIVE:   Patient stated both of my hips are bad. I'm getting the left one done 3 months after this one.     OBJECTIVE DATA SUMMARY:   HISTORY:    Past Medical History:   Diagnosis Date    Arthritis     hips    Spinal stenosis      Past Surgical History:   Procedure Laterality Date    HX  SECTION      x2 -,     HX HEENT      jaw surgery    HX HERNIA REPAIR  2021    Dr. Calvin Brown HYSTERECTOMY  2011    HX TUBAL LIGATION  1999     Prior Level of Function/Home Situation: mod I with 2 canes or RW, I with bathing and dressing but increased effort and time needed, not driving, not working since 10/22(works a  for [employer]). Reports her best friend will be her , along with her niece that lives with her, but works during the day. Personal factors and/or comorbidities impacting plan of care: none    Patient []   does  [x]   does not state signs/symptoms of shortness of breath/dyspnea on exertion/respiratory distress. Home Situation  Home Environment: Private residence  # Steps to Enter: 4  Rails to Enter: Yes  Hand Rails : Bilateral  One/Two Story Residence: One story  Living Alone: No  Support Systems: Friend/Neighbor, Other Family Member(s)  Current DME Used/Available at Home: [address], [address], straight, Dougie Deleon, rollator, [address] chair (reacher)  Tub or Shower Type: Tub/Shower combination      EXAMINATION/PRESENTATION/DECISION MAKING:     ADLs (Current Functional Status):    Bathing/Showering:   [x] Independent  [] Requires Assistance from Someone  [] Sponge Bath Only   Ambulation:  [x] Independent  [] Walk Indoors Only  [] Walk Outdoors  [x] Use Assistive Device(RW or 2 canes)  [] Use Wheelchair Only     Dressing:  [x] [address] from Someone for:  [] Sock/Shoes  [] Pants  [] Everything   Household Activities:  [] Routine house and yard work  [x] Light Housework Only(seated on rollator)  [] None       Critical Behavior:                Strength:    Strength: Generally decreased, functional                    Tone & Sensation:   Tone: Normal              Sensation: Impaired (R thigh intermitttent numbness)               Range Of Motion:  AROM: Generally decreased, functional                       Coordination:  Coordination: Within functional limits    Functional Mobility:  Transfers:  Sit to Stand: Modified independent, Additional time  Stand to Sit: Modified independent, Additional time                       Balance:   Sitting: Intact  Standing: Impaired  Standing - Static: Good, Unsupported, Fair, Occasional  Standing - Dynamic : Good, Constant support  Ambulation/Gait Training:  Distance (ft): 50 Feet (ft)  Assistive Device: Walker, rolling  Ambulation - Level of Assistance: Modified independent     Gait Description (WDL): Exceptions to WDL  Gait Abnormalities: Antalgic, Decreased step clearance, Step to gait           Stance: Right decreased, Left decreased  Speed/Amita: Slow  Step Length: Right shortened, Left shortened  Swing Pattern: Left asymmetrical, Right asymmetrical           Therapeutic Exercises:   The patient was educated in, has demonstrated, and has received written instructions to complete for their home exercise program per total hip replacement protocol. Functional Measure:  Timed up and go:    Timed Get Up And Go Test: 26.29       < than 10 seconds=Normal  Greater then 13.5 seconds (in elderly)=Increased fall risk   Magdaleno Santoyo [place] M. Predicting the probability for falls in community dwelling older adults using the Timed Up and Go Test. Phys Ther. 2000;80:896-903. Pain:  Pain Scale 1: Numeric (0 - 10)  Pain Intensity 1: 5  Pain Location 1: Hip; Buttocks  Pain Orientation 1: Left;Right  Pain Description 1: Aching; Shooting;Constant  Pain Intervention(s) 1: Medication (see MAR)    Activity Tolerance:   Good   Patient []   does  [x]   does not demonstrate signs/symptoms of shortness of breath/dyspnea on exertion/respiratory distress. COMMUNICATION/EDUCATION:   The patient was educated on:  [x]         Early post operative mobility is imperative to achieve a patient's desired outcomes and to restore biological function. [x]         Post operative hip precautions may/may not be applicable. These precautions are based on the patient's physician and the procedure(s) performed.     [x]         Anterior hip precautions including:        No hip extension        No external rotation        No crossing of the legs/midline    The patients plan of care was discussed as follows:   [x]         The patient verbalized understanding of her plan in preparation for their upcoming surgery  []         The patient's  was present for this session  []        The patient reports that he/she does not have a  identified at this time  []         The  verbalized understanding of the education regarding the patient's upcoming surgery  [x]         Patient/family agree to work toward stated goals and plan of care. []         Patient understands intent and goals of therapy, but is neutral about his/her participation. []         Patient is unable to participate in goal setting and plan of care.       Thank you for this referral.  Honey Villalobos, PT

## 2023-03-21 NOTE — PERIOP NOTES

## 2023-03-21 NOTE — PERIOP NOTES
Hibiclens/Chlorhexidine    Preventing Infections Before and After - Your Surgery    IMPORTANT INSTRUCTIONS    Please read and follow these instructions carefully. If you are unable to comply with the below instructions your procedure will be cancelled. Every Night for Three (3) nights before your surgery:  Shower with an antibacterial soap, such as Dial, or the soap provided at your preassessment appointment. A shower is better than a bath for cleaning your skin. If needed, ask someone to help you reach all areas of your body. Dont forget to clean your belly button with every shower. The night before your surgery: If you lose your Hibiclens/chlorhexidine please contact surgery center or you can purchase it at a local pharmacy  On the night before your surgery, shower with an antibacterial soap, such as Dial, or the soap provided at your preassessment appointment. With one packet of Hibiclens/Chlorhexidine in hand, turn water off. Apply Hibiclens antiseptic skin cleanser with a clean, freshly washed washcloth. Gently apply to your body from chin to toes (except the genital area) and especially the area(s) where your incision(s) will be. Leave Hibiclens/Chlorhexidine on your skin for at least 20 seconds. CAUTION: If needed, Hibiclens/chlorhexidine may be used to clean the folds of skin of the legs (such as in the area of the groin) and on your buttocks and hips. However, do not use Hibiclens/Chlorhexidine above the neck or in the genital area (your bottom) or put inside any area of your body. Turn the water back on and rinse. Dry gently with a clean, freshly washed towel. After your shower, do not use any powder, deodorant, perfumes or lotion. Use clean, freshly washed towels and washcloths every time you shower. Wear clean, freshly washed pajamas to bed the night before surgery. Sleep on clean, freshly washed sheets. Do not allow pets to sleep in your bed with you.         The Morning of your surgery:  Shower again thoroughly with an antibacterial soap, such as Dial or the soap provided at your preassessment appointment. If needed, ask someone for help to reach all areas of your body. Dont forget to clean your belly button! Rinse. Dry gently with a clean, freshly washed towel. After your shower, do not use any powder, deodorant, perfumes or lotion prior to surgery. Put on clean, freshly washed clothing. Tips to help prevent infections after your surgery:  Protect your surgical wound from germs:  Hand washing is the most important thing you and your caregivers can do to prevent infections. Keep your bandage clean and dry! Do not touch your surgical wound. Use clean, freshly washed towels and washcloths every time you shower; do not share bath linens with others. Until your surgical wound is healed, wear clothing and sleep on bed linens each day that are clean and freshly washed. Do not allow pets to sleep in your bed with you or touch your surgical wound. Do not smoke - smoking delays wound healing. This may be a good time to stop smoking. If you have diabetes, it is important for you to manage your blood sugar levels properly before your surgery as well as after your surgery. Poorly managed blood sugar levels slow down wound healing and prevent you from healing completely. If you lose your Hibiclens/chlorhexidine, please call the [facility], or it is available for purchase at your pharmacy.                ___________________      ___________________      ________________  (Signature of Patient)          (Witness)                   (Date and Time)

## 2023-03-21 NOTE — PERIOP NOTES
[facility]  Joint/Spine Preoperative Instructions        Surgery Date 3/30/23          Time of Arrival:  To Be Called  Contact #[phone]    1. On the day of your surgery, please report to the Surgical Services Registration Desk and sign in at your designated time. The Surgery Center is located to the right of the Emergency Room. 2. You must have someone with you to drive you home. You should not drive a car for 24 hours following surgery. Please make arrangements for a friend or family member to stay with you for the first 24 hours after your surgery. 3. No food after midnight 3/29/23. Medications morning of surgery should be taken with a sip of water. Please follow pre-surgery drink instructions that were given at your Pre Admission Testing appointment. 4. We recommend you do not drink any alcoholic beverages for 24 hours before and after your surgery. 5. Contact your surgeons office for instructions on the following medications: non-steroidal anti-inflammatory drugs (i.e. Advil, Aleve), vitamins, and supplements. (Some surgeons will want you to stop these medications prior to surgery and others may allow you to take them)  **If you are currently taking Plavix, Coumadin, Aspirin and/or other blood-thinning agents, contact your surgeon for instructions. ** Your surgeon will partner with the physician prescribing these medications to determine if it is safe to stop or if you need to continue taking. Please do not stop taking these medications without instructions from your surgeon    6. Wear comfortable clothes. Wear glasses instead of contacts. Do not bring any money or jewelry. Please bring picture ID, insurance card, and any prearranged co-payment or hospital payment. Do not wear make-up, particularly mascara the morning of your surgery. Do not wear nail polish, particularly if you are having foot /hand surgery.   Wear your hair loose or down, no ponytails, buns, beau pins or clips. All body piercings must be removed. Please shower with antibacterial soap for three consecutive days before and on the morning of surgery, but do not apply any lotions, powders or deodorants after the shower on the day of surgery. Please use a fresh towels after each shower. Please sleep in clean clothes and change bed linens the night before surgery. Please do not shave for 48 hours prior to surgery. Shaving of the face is acceptable. 7. You should understand that if you do not follow these instructions your surgery may be cancelled. If your physical condition changes (I.e. fever, cold or flu) please contact your surgeon as soon as possible. 8. It is important that you be on time. If a situation occurs where you may be late, please call [phone] (OR Holding Area). 9. If you have any questions and or problems, please call [phone] (Pre-admission Testing). 10. Your surgery time may be subject to change. You will receive a phone call the evening prior with your time of arrival.    11.  If having outpatient surgery, you must have someone to drive you here, stay with you during the duration of your stay, and to drive you home at time of discharge. 12. The following link is for the educational video for patients and/or families. http://barber-pena.org/. com/locations/cqbgillwh-lfpdwlw-qqfgwas/[place]/[email or web address]/educational-materials    Special Instructions:  Use Incentive Spirometer 20 times daily prior to surgery. Follow Dr. Avelina Ashford instructions regarding Diclofenac prior to surgery. TAKE ALL MEDICATIONS THE DAY OF SURGERY EXCEPT: Metormin, Vitamins, Supplements      I understand a pre-operative phone call will be made to verify my surgery time. In the event that I am not available, I give permission for a message to be left on my answering service and/or with another person?   yes         ___________________ __________   _________    (Signature of Patient)             (Witness)                (Date and Time)

## 2023-03-21 NOTE — PROGRESS NOTES
Patient attended the Joint Replacement Education Class at [facility]. The content of the class was presented using a power point presentation specific for patients undergoing hip and knee replacement surgery. The [facility] Joint Replacement Education Handbook was given to the patient. Preparing for surgery, day of surgery routine and expectations, discharge process and help at home expectations, nutrition,medications, infection control, pain management, DVT prevention, ice therapy and safety were reviewed in class. Opportunity for questions provided, patient verbalized understanding of instructions.

## 2023-03-21 NOTE — TELEPHONE ENCOUNTER
Patient is scheduled for surgery on 3/30/23 and would like to know when she will need to stop taking her diclofenac 75 mg

## 2023-03-21 NOTE — PERIOP NOTES

## 2023-03-22 LAB
BACTERIA SPEC CULT: NORMAL
SERVICE CMNT-IMP: NORMAL
SERVICE CMNT-IMP: NORMAL

## 2023-03-22 NOTE — ADVANCED PRACTICE NURSE
PAT Nurse Practitioner   Pre-Operative Chart Review/Assessment:-ORTHOPEDIC/NEUROSURGICAL SPINE                Patient Name:  Claudine Cowan                                                           Age:   48 y.o.    :  1970     Today's Date:  3/23/2023     Date of PAT:   3/21/23      Date of Surgery:    3/30/2023      Procedure(s):  Right  Total Hip Arthroplasty     Surgeon:   Denver Mayhew     Primary Care Provider:    Scheduled w/ Portillo Muro NP on 3/24/23 @ 0930                    PLAN:      1)  Cardiac Clearance:  Not requested        2)  Sleep apnea screening:  Not indicated-JLUIS 3       3)   Program for Diabetes Health Consult:  Not indicated-A1C 5.6       4) Treatment for MRSA/MSSA:  Negative      5) Additional Concerns:  BMI 45, spinal stenosis. Mobility limitations                Vital Signs:         Visit Vitals  BP (!) 125/59 (BP 1 Location: Right arm, BP Patient Position: Sitting)   Pulse 79   Temp 97.6 °F (36.4 °C)   Resp 15   Ht 5' 3\" (1.6 m)   Wt 116.9 kg (257 lb 11.5 oz)   LMP 2011   SpO2 98%   BMI 45.65 kg/m²                        ____________________________________________  PAST MEDICAL HISTORY  Past Medical History:   Diagnosis Date    Arthritis     hips    Spinal stenosis       ____________________________________________  PAST SURGICAL HISTORY  Past Surgical History:   Procedure Laterality Date    HX  SECTION      x2 -,     HX HEENT      jaw surgery    HX HERNIA REPAIR  2021    Dr. Resendez Bound      [address] [facility]      ____________________________________________  HOME MEDICATIONS    Current Outpatient Medications   Medication Sig    HYDROcodone-acetaminophen (NORCO) 5-325 mg per tablet Take 1 Tablet by mouth. acetaminophen (Tylenol Arthritis Pain) 650 mg TbER Take 650 mg by mouth every eight (8) hours. cyanocobalamin (Vitamin B-12) 1,000 mcg tablet Take 1,000 mcg by mouth daily.     TURMERIC PO Take  by mouth Three (3) times a week. multivitamin (ONE A DAY) tablet Take 1 Tablet by mouth daily. diclofenac EC (VOLTAREN) 75 mg EC tablet TAKE 1 TABLET BY MOUTH TWO TIMES A DAY. metFORMIN ER (GLUCOPHAGE XR) 500 mg tablet Take 1 Tablet by mouth daily (with dinner). No current facility-administered medications for this encounter.      ____________________________________________  ALLERGIES  Allergies   Allergen Reactions    Other Food Swelling     Allergy to jicama root      ____________________________________________  SOCIAL HISTORY  Social History     Tobacco Use    Smoking status: Never    Smokeless tobacco: Never   Substance Use Topics    Alcohol use: Yes     Comment: occasionally - socially      ____________________________________________  COVID VACCINATION STATUS:      Internal Administration   First Dose COVID-19, PFIZER PURPLE top, DILUTE for use, (age 15 y+), IM, 30mcg/0.3mL  03/17/2021   Second Dose COVID-19, PFIZER PURPLE top, DILUTE for use, (age 15 y+), IM, 30mcg/0.3mL  04/08/2021      Last COVID Lab No results found for: Maribell Dense, RCV2CT, CVD2M, COV2, XPLCVT, [id number], [id number], [address], [address], [address], [address], [address] [facility]                   Labs:     Hospital Outpatient Visit on 03/21/2023   Component Date Value Ref Range Status    Sodium 03/21/2023 141  136 - 145 mmol/L Final    Potassium 03/21/2023 3.7  3.5 - 5.1 mmol/L Final    Chloride 03/21/2023 112 (A)  97 - 108 mmol/L Final    CO2 03/21/2023 24  21 - 32 mmol/L Final    Anion gap 03/21/2023 5  5 - 15 mmol/L Final    Glucose 03/21/2023 108 (A)  65 - 100 mg/dL Final    BUN 03/21/2023 13  6 - 20 MG/DL Final    Creatinine 03/21/2023 0.76  0.55 - 1.02 MG/DL Final    BUN/Creatinine ratio 03/21/2023 17  12 - 20   Final    eGFR 03/21/2023 >60  >60 ml/min/1.73m2 Final    Comment:      Pediatric calculator link: Angela.at. org/professionals/kdoqi/gfr_calculatorped       These results are not intended for use in patients <18 years of age. eGFR results are calculated without a race factor using  the 2021 CKD-EPI equation. Careful clinical correlation is recommended, particularly when comparing to results calculated using previous equations. The CKD-EPI equation is less accurate in patients with extremes of muscle mass, extra-renal metabolism of creatinine, excessive creatine ingestion, or following therapy that affects renal tubular secretion. Calcium 03/21/2023 9.6  8.5 - 10.1 MG/DL Final    Bilirubin, total 03/21/2023 0.5  0.2 - 1.0 MG/DL Final    ALT (SGPT) 03/21/2023 60  12 - 78 U/L Final    AST (SGOT) 03/21/2023 26  15 - 37 U/L Final    Alk. phosphatase 03/21/2023 68  45 - 117 U/L Final    Protein, total 03/21/2023 7.5  6.4 - 8.2 g/dL Final    Albumin 03/21/2023 3.8  3.5 - 5.0 g/dL Final    Globulin 03/21/2023 3.7  2.0 - 4.0 g/dL Final    A-G Ratio 03/21/2023 1.0 (A)  1.1 - 2.2   Final    WBC 03/21/2023 8.3  3.6 - 11.0 K/uL Final    RBC 03/21/2023 4.27  3.80 - 5.20 M/uL Final    HGB 03/21/2023 14.3  11.5 - 16.0 g/dL Final    HCT 03/21/2023 42.5  35.0 - 47.0 % Final    MCV 03/21/2023 99.5 (A)  80.0 - 99.0 FL Final    MCH 03/21/2023 33.5  26.0 - 34.0 PG Final    MCHC 03/21/2023 33.6  30.0 - 36.5 g/dL Final    RDW 03/21/2023 12.3  11.5 - 14.5 % Final    PLATELET 51/51/9233 777  150 - 400 K/uL Final    MPV 03/21/2023 11.2  8.9 - 12.9 FL Final    NRBC 03/21/2023 0.0  0  WBC Final    ABSOLUTE NRBC 03/21/2023 0.00  0.00 - 0.01 K/uL Final    INR 03/21/2023 1.0  0.9 - 1.1   Final    A single therapeutic range for Vit K antagonists may not be optimal for all indications - see June, 2008 issue of Chest, American College of Chest Physicians Evidence-Based Clinical Practice Guidelines, 8th Edition.     Prothrombin time 03/21/2023 10.7  9.0 - 11.1 sec Final    Color 03/21/2023 YELLOW/STRAW    Final    Color Reference Range: Straw, Yellow or Dark Yellow    Appearance 03/21/2023 CLOUDY (A)  CLEAR   Final    pH (UA) 03/21/2023 5.0 5.0 - 8.0   Final    Protein 03/21/2023 TRACE (A)  NEG mg/dL Final    Glucose 03/21/2023 Negative  NEG mg/dL Final    Ketone 03/21/2023 TRACE (A)  NEG mg/dL Final    Bilirubin 03/21/2023 Negative  NEG   Final    Blood 03/21/2023 TRACE (A)  NEG   Final    Urobilinogen 03/21/2023 1.0  0.2 - 1.0 EU/dL Final    Nitrites 03/21/2023 Negative  NEG   Final    Leukocyte Esterase 03/21/2023 Negative  NEG   Final    WBC 03/21/2023 10-20  0 - 4 /hpf Final    RBC 03/21/2023 5-10  0 - 5 /hpf Final    Epithelial cells 03/21/2023 MANY (A)  FEW /lpf Final    Epithelial cell category consists of squamous cells and /or transitional urothelial cells. Renal tubular cells, if present, are separately identified as such. Bacteria 03/21/2023 2+ (A)  NEG /hpf Final    UA:UC IF INDICATED 03/21/2023 URINE CULTURE ORDERED (A)  CNI   Final    CA Oxalate crystals 03/21/2023 1+ (A)  NEG Final    Hemoglobin A1c 03/21/2023 5.6  4.0 - 5.6 % Final    Comment: NEW METHOD  PLEASE NOTE NEW REFERENCE RANGE  (NOTE)  HbA1C Interpretive Ranges  <5.7              Normal  5.7 - 6.4         Consider Prediabetes  >6.5              Consider Diabetes      Est. average glucose 03/21/2023 114  mg/dL Final    Special Requests: 03/21/2023 NO SPECIAL REQUESTS    Final    Culture result: 03/21/2023 MRSA NOT PRESENT    Final    Culture result: 03/21/2023     Final                    Value:Screening of patient nares for MRSA is for surveillance purposes and, if positive, to facilitate isolation considerations in high risk settings. It is not intended for automatic decolonization interventions per se as regimens are not sufficiently effective to warrant routine use.       Crossmatch Expiration 03/21/2023 04/02/2023,9405   Final    ABO/Rh(D) 03/21/2023 O POSITIVE   Final    Antibody screen 03/21/2023 NEG   Final    Special Requests: 03/21/2023     Final                    Value:NO SPECIAL REQUESTS  Reflexed from [id number]      Culture result: 03/21/2023 No significant growth, <10,000 CFU/mL    Final      CULTURE, URINE [[id number]] (Order [id number])  Microbiology  Date: 3/21/2023 Department: Mrm Or Preadmit Tsting Released By:  (auto-released) Authorizing: Raya Patel DO     CULTURE, URINE  Order: [id number]  Collected 3/21/2023 10:00    Status: Final result    Specimen Information: Urine   0 Result Notes        Component Ref Range & Units 3/21/23 1000 Resulting Agency   Special Requests:   NO SPECIAL REQUESTS   Reflexed from [id number]  [facility]   Culture result:   No significant growth, <10,000 CFU/mL  ST. [address]              Specimen Collected: 03/21/23 10:00 Last Resulted: 03/22/23 16:00       Order Details     Lab and Collection Details     Routing     Result History     View Encounter Conversation           Result Care Coordination      Patient Communication     Released  Seen Back to Top           Collection Information    Specimen ID: [id number]    Urine       Collected: 3/21/2023 10:00 AM    [id number]    Resulting Agency: [facility]. Renetta Soto,    [address] [place] [address]        Lab Information    Lab   [facility] Renetta Soto,    [address] [place] [address]                [facility] [address] [place] [address] Lab Information       CULTURE, URINE: Patient Communication     Released  Seen     All Reviewers List    Raya Patel DO on 3/22/2023 22:27   Raya Patel DO on 3/22/2023 16:13     Provider Information    Ordering User Ordering Provider Authorizing Provider   [facility] In [place], [place] Raya Patel DO   Attending Provider(s) PCP   Raya Patel DO Khai Beverage, NP     How to build your own [employer], URINE (Order #[id number]) on 3/21/23     Tracking Links    Cosign Tracking Order Transmittal Tracking     XR Results (most recent):    Results from [place] encounter on 09/21/22    XR HIPS BI W OR WO AP PELV    Narrative  EXAM: XR HIPS BI W OR WO AP PELV    INDICATION: pain. COMPARISON: 2020    FINDINGS: 3 views bilateral hips. AP view of the pelvis and frogleg lateral  views of both hips demonstrate progressive severe degenerative changes with  deformity of the femoral head, loss of joint space and subchondral changes. Impression  Progression of severe deformity. Skin:   Denies open wounds, cuts, sores, rashes or other areas of concern in PAT assessment.         Romeo Flores, NP

## 2023-03-24 ENCOUNTER — OFFICE VISIT (OUTPATIENT)
Dept: FAMILY MEDICINE CLINIC | Age: 53
End: 2023-03-24

## 2023-03-24 VITALS
RESPIRATION RATE: 18 BRPM | BODY MASS INDEX: 45.04 KG/M2 | HEART RATE: 83 BPM | OXYGEN SATURATION: 95 % | WEIGHT: 254.2 LBS | HEIGHT: 63 IN | TEMPERATURE: 96.8 F | SYSTOLIC BLOOD PRESSURE: 122 MMHG | DIASTOLIC BLOOD PRESSURE: 70 MMHG

## 2023-03-24 DIAGNOSIS — E66.01 OBESITY, MORBID (HCC): ICD-10-CM

## 2023-03-24 DIAGNOSIS — M16.0 PRIMARY OSTEOARTHRITIS OF BOTH HIPS: ICD-10-CM

## 2023-03-24 DIAGNOSIS — Z01.818 PREOPERATIVE GENERAL PHYSICAL EXAMINATION: Primary | ICD-10-CM

## 2023-03-24 NOTE — PROGRESS NOTES
Chief Complaint   Patient presents with    Pre-op Exam     Hip surgery 3/30/23       1. \"Have you been to the ER, urgent care clinic since your last visit? Hospitalized since your last visit? \" No    2. \"Have you seen or consulted any other health care providers outside of the [address] [place] [address] since your last visit? \" No     3. For patients aged 39-70: Has the patient had a colonoscopy / FIT/ Cologuard? No      If the patient is female:    4. For patients aged 41-77: Has the patient had a mammogram within the past 2 years? No      5. For patients aged 21-65: Has the patient had a pap smear?  No    Health Maintenance Due   Topic Date Due    Hepatitis C Screening  Never done    Cervical cancer screen  04/29/2016    Colorectal Cancer Screening Combo  Never done    COVID-19 Vaccine (4 - Booster for Pfizer series) 12/04/2021    Breast Cancer Screen Mammogram  01/22/2022    Shingles Vaccine (2 of 2) 06/28/2022    Flu Vaccine (1) 08/01/2022

## 2023-03-24 NOTE — PROGRESS NOTES
Andrei Garrido (: 1970) is a 48 y.o. female, established patient, here for evaluation of the following chief complaint(s):  Pre-op Exam (Hip surgery 3/30/23)       ASSESSMENT/PLAN:  Below is the assessment and plan developed based on review of pertinent history, physical exam, labs, studies, and medications. 1. Preoperative general physical examination - patient is medically cleared to proceed with hip surgery  2. Primary osteoarthritis of both hips - scheduled 3/30/23 - right total hip arthroplasty with Dr. Annel Carcamo  3. Obesity, morbid ([address] - continue to work on diet, exercise and weight reduction. Highest weight 281#, she is down to 254# today. She will schedule appt few weeks after surgery for shingles vaccine, COVID booster  Also needs to schedule colonoscopy and mammo after surgery    SUBJECTIVE/OBJECTIVE:  HPI  patient comes in today for preop physical.  3/30/23 - right total hip arthroplasty with Dr. Annel Carcamo for hip OA  Highest weight 281#, she is down to 254# today. Has lost 7 pounds since January  Only eats once daily. Tries to split it into 2 meals. Drinks water all day  May drink cup of coffee in morning. Eating a lot of salads, tuna, salmon. Can't stand for long periods to cook. Using rollator. Taking metformin. BMs more normal now  A1c 5.6% on preop labs. No longer in prediabetes range.   EKG from 2022 normal  Labs from preop reviewed - normal.    Denies chest pains, palpitations, dyspnea, dizziness, edema  Allergies   Allergen Reactions    Other Food Swelling     Allergy to jicama root       Past Medical History:   Diagnosis Date    Arthritis     hips    Spinal stenosis        Past Surgical History:   Procedure Laterality Date    HX  SECTION      x2 -,     HX HEENT      jaw surgery    HX HERNIA REPAIR  2021    Dr. Izzy Fletcher      [address] [facility] History     Socioeconomic History    Marital status: SINGLE     Spouse name: Not on file    Number of children: Not on file    Years of education: Not on file    Highest education level: Not on file   Occupational History    Not on file   Tobacco Use    Smoking status: Never    Smokeless tobacco: Never   Vaping Use    Vaping Use: Never used   Substance and Sexual Activity    Alcohol use: Yes     Comment: occasionally - socially    Drug use: No    Sexual activity: Yes     Partners: Male     Birth control/protection: Surgical   Other Topics Concern    Not on file   Social History Narrative    Not on file     Social Determinants of Health     Financial Resource Strain: Low Risk     Difficulty of Paying Living Expenses: Not hard at all   Food Insecurity: No Food Insecurity    Worried About Running Out of Food in the Last Year: Never true    Ran Out of Food in the Last Year: Never true   Transportation Needs: Not on file   Physical Activity: Not on file   Stress: Not on file   Social Connections: Not on file   Intimate Partner Violence: Not on file   Housing Stability: Not on file       Family History   Problem Relation Age of Onset    Cancer Mother         larynx    Breast Cancer Maternal Aunt         age unknown       Current Outpatient Medications   Medication Sig    acetaminophen (TYLENOL) 650 mg TbER Take 650 mg by mouth every eight (8) hours. cyanocobalamin 1,000 mcg tablet Take 1,000 mcg by mouth daily. TURMERIC PO Take  by mouth Three (3) times a week. multivitamin (ONE A DAY) tablet Take 1 Tablet by mouth daily. diclofenac EC (VOLTAREN) 75 mg EC tablet TAKE 1 TABLET BY MOUTH TWO TIMES A DAY. metFORMIN ER (GLUCOPHAGE XR) 500 mg tablet Take 1 Tablet by mouth daily (with dinner). HYDROcodone-acetaminophen (NORCO) 5-325 mg per tablet Take 1 Tablet by mouth. (Patient not taking: Reported on 3/24/2023)     No current facility-administered medications for this visit. Review of Systems   Constitutional: Negative. Respiratory: Negative. Cardiovascular: Negative. Gastrointestinal: Negative. Genitourinary: Negative. Musculoskeletal:  Positive for arthralgias (hip pain). Neurological: Negative. Psychiatric/Behavioral: Negative. Vitals:    03/24/23 0935   BP: 122/70   Pulse: 83   Resp: 18   Temp: 96.8 °F (36 °C)   TempSrc: Oral   SpO2: 95%   Weight: 254 lb 3.2 oz (115.3 kg)   Height: 5' 3\" (1.6 m)     Physical Exam  Vitals reviewed. Constitutional:       Appearance: Normal appearance. She is well-developed and well-groomed. HENT:      Right Ear: Hearing normal.      Left Ear: Hearing normal.   Neck:      Thyroid: No thyromegaly. Cardiovascular:      Rate and Rhythm: Normal rate and regular rhythm. Pulses:           Dorsalis pedis pulses are 2+ on the right side and 2+ on the left side. Heart sounds: Normal heart sounds. Pulmonary:      Effort: Pulmonary effort is normal.      Breath sounds: Normal breath sounds. Abdominal:      General: Abdomen is protuberant. Bowel sounds are normal.      Palpations: Abdomen is soft. Tenderness: There is no abdominal tenderness. Comments: Abd obese, exam limited by body habtius   Musculoskeletal:      Right lower leg: No edema. Left lower leg: No edema. Lymphadenopathy:      Cervical: No cervical adenopathy. Skin:     General: Skin is warm and dry. Neurological:      Mental Status: She is alert and oriented to person, place, and time. Psychiatric:         Attention and Perception: Attention normal.         Mood and Affect: Mood and affect normal.         Speech: Speech normal.         Behavior: Behavior normal. Behavior is cooperative. Thought Content:  Thought content normal.         Cognition and Memory: Cognition and memory normal.     On this date 03/24/2023 I have spent 25 minutes reviewing previous notes, test results and face to face with the patient discussing the diagnosis and importance of compliance with the treatment plan as well as documenting on the day of the visit. An electronic signature was used to authenticate this note.   -- Swapnil Monroy NP

## 2023-03-28 RX ORDER — DICLOFENAC SODIUM 75 MG/1
TABLET, DELAYED RELEASE ORAL
Qty: 60 TABLET | Refills: 0 | Status: SHIPPED | OUTPATIENT
Start: 2023-03-28

## 2023-03-29 ENCOUNTER — ANESTHESIA EVENT (OUTPATIENT)
Dept: SURGERY | Age: 53
End: 2023-03-29
Payer: COMMERCIAL

## 2023-03-29 NOTE — ANESTHESIA PREPROCEDURE EVALUATION
Relevant Problems   ENDOCRINE   (+) Obesity, morbid (HCC)       Anesthetic History   No history of anesthetic complications            Review of Systems / Medical History  Patient summary reviewed and pertinent labs reviewed    Pulmonary  Within defined limits                 Neuro/Psych             Comments: Hx of spinal stenosis Cardiovascular  Within defined limits                Exercise tolerance: >4 METS     GI/Hepatic/Renal  Within defined limits              Endo/Other        Morbid obesity and arthritis     Other Findings              Physical Exam    Airway  Mallampati: II  TM Distance: > 6 cm  Neck ROM: normal range of motion   Mouth opening: Normal     Cardiovascular  Regular rate and rhythm,  S1 and S2 normal,  no murmur, click, rub, or gallop  Rhythm: regular  Rate: normal         Dental  No notable dental hx       Pulmonary  Breath sounds clear to auscultation               Abdominal  GI exam deferred       Other Findings            Anesthetic Plan    ASA: 3  Anesthesia type: spinal and general - backup          Induction: Intravenous  Anesthetic plan and risks discussed with: Patient

## 2023-03-30 ENCOUNTER — ANESTHESIA (OUTPATIENT)
Dept: SURGERY | Age: 53
End: 2023-03-30
Payer: COMMERCIAL

## 2023-03-30 ENCOUNTER — HOSPITAL ENCOUNTER (OUTPATIENT)
Age: 53
Setting detail: OBSERVATION
Discharge: HOME | End: 2023-04-01
Attending: ORTHOPAEDIC SURGERY | Admitting: STUDENT IN AN ORGANIZED HEALTH CARE EDUCATION/TRAINING PROGRAM
Payer: COMMERCIAL

## 2023-03-30 ENCOUNTER — APPOINTMENT (OUTPATIENT)
Dept: GENERAL RADIOLOGY | Age: 53
End: 2023-03-30
Attending: ORTHOPAEDIC SURGERY
Payer: COMMERCIAL

## 2023-03-30 DIAGNOSIS — Z96.641 STATUS POST TOTAL REPLACEMENT OF RIGHT HIP: Primary | ICD-10-CM

## 2023-03-30 PROBLEM — M16.11 UNILATERAL PRIMARY OSTEOARTHRITIS, RIGHT HIP: Status: ACTIVE | Noted: 2023-03-30

## 2023-03-30 LAB
GLUCOSE BLD STRIP.AUTO-MCNC: 117 MG/DL (ref 65–117)
GLUCOSE BLD STRIP.AUTO-MCNC: 132 MG/DL (ref 65–117)
SERVICE CMNT-IMP: ABNORMAL
SERVICE CMNT-IMP: NORMAL

## 2023-03-30 PROCEDURE — 77030008462 HC STPLR SKN PROX J&J -A: Performed by: ORTHOPAEDIC SURGERY

## 2023-03-30 PROCEDURE — 74011250636 HC RX REV CODE- 250/636: Performed by: NURSE ANESTHETIST, CERTIFIED REGISTERED

## 2023-03-30 PROCEDURE — 74011250637 HC RX REV CODE- 250/637: Performed by: ORTHOPAEDIC SURGERY

## 2023-03-30 PROCEDURE — 77030018673: Performed by: ORTHOPAEDIC SURGERY

## 2023-03-30 PROCEDURE — 77030006835 HC BLD SAW SAG STRY -B: Performed by: ORTHOPAEDIC SURGERY

## 2023-03-30 PROCEDURE — 77030040179 HC DEV DRSG WND PICO S&N -C: Performed by: ORTHOPAEDIC SURGERY

## 2023-03-30 PROCEDURE — 76000 FLUOROSCOPY <1 HR PHYS/QHP: CPT

## 2023-03-30 PROCEDURE — 27130 TOTAL HIP ARTHROPLASTY: CPT | Performed by: ORTHOPAEDIC SURGERY

## 2023-03-30 PROCEDURE — 97161 PT EVAL LOW COMPLEX 20 MIN: CPT

## 2023-03-30 PROCEDURE — C1776 JOINT DEVICE (IMPLANTABLE): HCPCS | Performed by: ORTHOPAEDIC SURGERY

## 2023-03-30 PROCEDURE — 74011250636 HC RX REV CODE- 250/636: Performed by: ANESTHESIOLOGY

## 2023-03-30 PROCEDURE — 77030038692 HC WND DEB SYS IRMX -B: Performed by: ORTHOPAEDIC SURGERY

## 2023-03-30 PROCEDURE — 74011000250 HC RX REV CODE- 250: Performed by: NURSE ANESTHETIST, CERTIFIED REGISTERED

## 2023-03-30 PROCEDURE — 74011000250 HC RX REV CODE- 250: Performed by: ANESTHESIOLOGY

## 2023-03-30 PROCEDURE — 93005 ELECTROCARDIOGRAM TRACING: CPT

## 2023-03-30 PROCEDURE — 74011250637 HC RX REV CODE- 250/637: Performed by: ANESTHESIOLOGY

## 2023-03-30 PROCEDURE — 77030031139 HC SUT VCRL2 J&J -A: Performed by: ORTHOPAEDIC SURGERY

## 2023-03-30 PROCEDURE — 77030020274 HC MISC IMPL ORTHOPEDIC: Performed by: ORTHOPAEDIC SURGERY

## 2023-03-30 PROCEDURE — 72170 X-RAY EXAM OF PELVIS: CPT

## 2023-03-30 PROCEDURE — 74011000250 HC RX REV CODE- 250: Performed by: ORTHOPAEDIC SURGERY

## 2023-03-30 PROCEDURE — 76210000017 HC OR PH I REC 1.5 TO 2 HR: Performed by: ORTHOPAEDIC SURGERY

## 2023-03-30 PROCEDURE — G0378 HOSPITAL OBSERVATION PER HR: HCPCS

## 2023-03-30 PROCEDURE — 82962 GLUCOSE BLOOD TEST: CPT

## 2023-03-30 PROCEDURE — 2709999900 HC NON-CHARGEABLE SUPPLY

## 2023-03-30 PROCEDURE — 76010000162 HC OR TIME 1.5 TO 2 HR INTENSV-TIER 1: Performed by: ORTHOPAEDIC SURGERY

## 2023-03-30 PROCEDURE — 74011000258 HC RX REV CODE- 258: Performed by: ORTHOPAEDIC SURGERY

## 2023-03-30 PROCEDURE — 2709999900 HC NON-CHARGEABLE SUPPLY: Performed by: ORTHOPAEDIC SURGERY

## 2023-03-30 PROCEDURE — 74011250636 HC RX REV CODE- 250/636: Performed by: ORTHOPAEDIC SURGERY

## 2023-03-30 PROCEDURE — 73501 X-RAY EXAM HIP UNI 1 VIEW: CPT

## 2023-03-30 PROCEDURE — 76060000034 HC ANESTHESIA 1.5 TO 2 HR: Performed by: ORTHOPAEDIC SURGERY

## 2023-03-30 PROCEDURE — 97530 THERAPEUTIC ACTIVITIES: CPT

## 2023-03-30 PROCEDURE — 77030039825 HC MSK NSL PAP SUPERNO2VA VYRM -B: Performed by: ANESTHESIOLOGY

## 2023-03-30 PROCEDURE — 77030011264 HC ELECTRD BLD EXT COVD -A: Performed by: ORTHOPAEDIC SURGERY

## 2023-03-30 DEVICE — STEM FEM SZ 3 127D 30X102MM -- ACCOLADE II V40: Type: IMPLANTABLE DEVICE | Site: HIP | Status: FUNCTIONAL

## 2023-03-30 DEVICE — SCR HEX 6.5X30MM -- TRIDENT II: Type: IMPLANTABLE DEVICE | Site: HIP | Status: FUNCTIONAL

## 2023-03-30 DEVICE — INSERT ACET E 0 DEG 36 MM HIP X3 TRIDENT: Type: IMPLANTABLE DEVICE | Site: HIP | Status: FUNCTIONAL

## 2023-03-30 DEVICE — HEAD FEM DELT V40 +5MM NK 36MM -- V40 BIOLOX: Type: IMPLANTABLE DEVICE | Site: HIP | Status: FUNCTIONAL

## 2023-03-30 DEVICE — SHELL ACET SZ E DIA52MM TRITANIUM 11 H TRIDENT II: Type: IMPLANTABLE DEVICE | Site: HIP | Status: FUNCTIONAL

## 2023-03-30 DEVICE — IMPLANTABLE DEVICE: Type: IMPLANTABLE DEVICE | Site: HIP | Status: FUNCTIONAL

## 2023-03-30 DEVICE — COMPONENT TOT HIP CAPPED LNR POLYETH H2STRYKER] STRYKER CORP]: Type: IMPLANTABLE DEVICE | Site: HIP | Status: FUNCTIONAL

## 2023-03-30 RX ORDER — DEXAMETHASONE SODIUM PHOSPHATE 100 MG/10ML
10 INJECTION INTRAMUSCULAR; INTRAVENOUS ONCE
Status: COMPLETED
Start: 2023-03-30 | End: 2023-03-30

## 2023-03-30 RX ORDER — PHENYLEPHRINE HCL IN 0.9% NACL 0.4MG/10ML
SYRINGE (ML) INTRAVENOUS AS NEEDED
Status: DISCONTINUED | OUTPATIENT
Start: 2023-03-30 | End: 2023-03-30 | Stop reason: HOSPADM

## 2023-03-30 RX ORDER — SODIUM CHLORIDE 0.9 % (FLUSH) 0.9 %
5-40 SYRINGE (ML) INJECTION AS NEEDED
Status: DISCONTINUED
Start: 2023-03-30 | End: 2023-03-30 | Stop reason: HOSPADM

## 2023-03-30 RX ORDER — FENTANYL CITRATE 50 UG/ML
25 INJECTION, SOLUTION INTRAMUSCULAR; INTRAVENOUS
Status: DISCONTINUED
Start: 2023-03-30 | End: 2023-03-30 | Stop reason: HOSPADM

## 2023-03-30 RX ORDER — BUPIVACAINE HYDROCHLORIDE 5 MG/ML
INJECTION, SOLUTION EPIDURAL; INTRACAUDAL AS NEEDED
Status: DISCONTINUED | OUTPATIENT
Start: 2023-03-30 | End: 2023-03-30 | Stop reason: HOSPADM

## 2023-03-30 RX ORDER — AMOXICILLIN 250 MG
1 CAPSULE ORAL 2 TIMES DAILY
Qty: 14 TABLET | Refills: 0 | Status: SHIPPED
Start: 2023-03-30 | End: 2023-04-06

## 2023-03-30 RX ORDER — TRAMADOL HYDROCHLORIDE 50 MG/1
50 TABLET ORAL
Status: DISCONTINUED
Start: 2023-03-30 | End: 2023-04-01 | Stop reason: HOSPADM

## 2023-03-30 RX ORDER — ERYTHROMYCIN 5 MG/G
OINTMENT OPHTHALMIC
Qty: 1 G | Refills: 0 | Status: SHIPPED | OUTPATIENT
Start: 2023-03-30

## 2023-03-30 RX ORDER — HYDROMORPHONE HYDROCHLORIDE 1 MG/ML
0.2 INJECTION, SOLUTION INTRAMUSCULAR; INTRAVENOUS; SUBCUTANEOUS
Status: DISCONTINUED
Start: 2023-03-30 | End: 2023-03-30 | Stop reason: HOSPADM

## 2023-03-30 RX ORDER — METFORMIN HYDROCHLORIDE 500 MG/1
500 TABLET, EXTENDED RELEASE ORAL
Status: DISCONTINUED
Start: 2023-03-30 | End: 2023-03-30 | Stop reason: CLARIF

## 2023-03-30 RX ORDER — MAGNESIUM 250 MG: TABLET ORAL

## 2023-03-30 RX ORDER — ACETAMINOPHEN 500 MG
1000 TABLET ORAL EVERY 6 HOURS
Status: DISCONTINUED
Start: 2023-03-30 | End: 2023-04-01 | Stop reason: HOSPADM

## 2023-03-30 RX ORDER — ACETAMINOPHEN 325 MG/1
650 TABLET ORAL
Status: DISCONTINUED
Start: 2023-03-30 | End: 2023-03-30 | Stop reason: SDUPTHER

## 2023-03-30 RX ORDER — ONDANSETRON 2 MG/ML
4 INJECTION INTRAMUSCULAR; INTRAVENOUS
Status: ACTIVE
Start: 2023-03-30 | End: 2023-03-31

## 2023-03-30 RX ORDER — SODIUM CHLORIDE 0.9 % (FLUSH) 0.9 %
5-40 SYRINGE (ML) INJECTION EVERY 8 HOURS
Status: DISCONTINUED
Start: 2023-03-30 | End: 2023-04-01 | Stop reason: HOSPADM

## 2023-03-30 RX ORDER — ERYTHROMYCIN 5 MG/G
OINTMENT OPHTHALMIC ONCE
Status: COMPLETED
Start: 2023-03-30 | End: 2023-03-30

## 2023-03-30 RX ORDER — SODIUM CHLORIDE, SODIUM LACTATE, POTASSIUM CHLORIDE, CALCIUM CHLORIDE 600; 310; 30; 20 MG/100ML; MG/100ML; MG/100ML; MG/100ML
25 INJECTION, SOLUTION INTRAVENOUS CONTINUOUS
Status: DISCONTINUED
Start: 2023-03-30 | End: 2023-03-30 | Stop reason: HOSPADM

## 2023-03-30 RX ORDER — LANOLIN ALCOHOL/MO/W.PET/CERES
1000 CREAM (GRAM) TOPICAL DAILY
Status: DISCONTINUED
Start: 2023-03-30 | End: 2023-04-01 | Stop reason: HOSPADM

## 2023-03-30 RX ORDER — ROPIVACAINE HYDROCHLORIDE 5 MG/ML
INJECTION, SOLUTION EPIDURAL; INFILTRATION; PERINEURAL AS NEEDED
Status: DISCONTINUED
Start: 2023-03-30 | End: 2023-03-30 | Stop reason: HOSPADM

## 2023-03-30 RX ORDER — KETOROLAC TROMETHAMINE 30 MG/ML
30 INJECTION, SOLUTION INTRAMUSCULAR; INTRAVENOUS EVERY 6 HOURS
Status: COMPLETED
Start: 2023-03-30 | End: 2023-03-31

## 2023-03-30 RX ORDER — LANOLIN ALCOHOL/MO/W.PET/CERES
400 CREAM (GRAM) TOPICAL DAILY
Status: DISCONTINUED
Start: 2023-03-31 | End: 2023-04-01 | Stop reason: HOSPADM

## 2023-03-30 RX ORDER — DEXMEDETOMIDINE HYDROCHLORIDE 100 UG/ML
INJECTION, SOLUTION INTRAVENOUS AS NEEDED
Status: DISCONTINUED | OUTPATIENT
Start: 2023-03-30 | End: 2023-03-30 | Stop reason: HOSPADM

## 2023-03-30 RX ORDER — SODIUM CHLORIDE 0.9 % (FLUSH) 0.9 %
5-40 SYRINGE (ML) INJECTION AS NEEDED
Status: DISCONTINUED
Start: 2023-03-30 | End: 2023-04-01 | Stop reason: HOSPADM

## 2023-03-30 RX ORDER — NALOXONE HYDROCHLORIDE 0.4 MG/ML
0.4 INJECTION, SOLUTION INTRAMUSCULAR; INTRAVENOUS; SUBCUTANEOUS AS NEEDED
Status: DISCONTINUED
Start: 2023-03-30 | End: 2023-04-01 | Stop reason: HOSPADM

## 2023-03-30 RX ORDER — AMOXICILLIN 250 MG
1 CAPSULE ORAL 2 TIMES DAILY
Status: DISCONTINUED
Start: 2023-03-30 | End: 2023-04-01 | Stop reason: HOSPADM

## 2023-03-30 RX ORDER — ERYTHROMYCIN 5 MG/G
OINTMENT OPHTHALMIC EVERY 8 HOURS
Status: DISCONTINUED
Start: 2023-03-30 | End: 2023-04-01 | Stop reason: HOSPADM

## 2023-03-30 RX ORDER — SODIUM CHLORIDE 0.9 % (FLUSH) 0.9 %
5-40 SYRINGE (ML) INJECTION EVERY 8 HOURS
Status: DISCONTINUED
Start: 2023-03-30 | End: 2023-03-30 | Stop reason: HOSPADM

## 2023-03-30 RX ORDER — PROPOFOL 10 MG/ML
INJECTION, EMULSION INTRAVENOUS AS NEEDED
Status: DISCONTINUED | OUTPATIENT
Start: 2023-03-30 | End: 2023-03-30 | Stop reason: HOSPADM

## 2023-03-30 RX ORDER — SODIUM CHLORIDE 9 MG/ML
125 INJECTION, SOLUTION INTRAVENOUS CONTINUOUS
Status: DISPENSED
Start: 2023-03-30 | End: 2023-03-31

## 2023-03-30 RX ORDER — OXYCODONE HYDROCHLORIDE 5 MG/1
5 TABLET ORAL
Qty: 30 TABLET | Refills: 0 | Status: SHIPPED
Start: 2023-03-30 | End: 2023-04-06

## 2023-03-30 RX ORDER — FACIAL-BODY WIPES
10 EACH TOPICAL DAILY PRN
Status: DISCONTINUED
Start: 2023-04-01 | End: 2023-04-01 | Stop reason: HOSPADM

## 2023-03-30 RX ORDER — POLYETHYLENE GLYCOL 3350 17 G/17G
17 POWDER, FOR SOLUTION ORAL DAILY
Status: DISCONTINUED
Start: 2023-03-30 | End: 2023-04-01 | Stop reason: HOSPADM

## 2023-03-30 RX ORDER — ASPIRIN 325 MG
325 TABLET, DELAYED RELEASE (ENTERIC COATED) ORAL 2 TIMES DAILY
Qty: 60 TABLET | Refills: 0 | Status: SHIPPED | OUTPATIENT
Start: 2023-03-30 | End: 2023-04-29

## 2023-03-30 RX ORDER — ASPIRIN 325 MG
325 TABLET, DELAYED RELEASE (ENTERIC COATED) ORAL 2 TIMES DAILY
Status: DISCONTINUED
Start: 2023-03-30 | End: 2023-04-01 | Stop reason: HOSPADM

## 2023-03-30 RX ORDER — ONDANSETRON 2 MG/ML
4 INJECTION INTRAMUSCULAR; INTRAVENOUS AS NEEDED
Status: DISCONTINUED
Start: 2023-03-30 | End: 2023-03-30 | Stop reason: HOSPADM

## 2023-03-30 RX ORDER — VANCOMYCIN HYDROCHLORIDE 1 G/20ML
INJECTION, POWDER, LYOPHILIZED, FOR SOLUTION INTRAVENOUS AS NEEDED
Status: DISCONTINUED
Start: 2023-03-30 | End: 2023-03-30 | Stop reason: HOSPADM

## 2023-03-30 RX ORDER — HYDROMORPHONE HYDROCHLORIDE 1 MG/ML
0.5 INJECTION, SOLUTION INTRAMUSCULAR; INTRAVENOUS; SUBCUTANEOUS
Status: ACTIVE
Start: 2023-03-30 | End: 2023-03-31

## 2023-03-30 RX ORDER — FAMOTIDINE 20 MG/1
20 TABLET, FILM COATED ORAL 2 TIMES DAILY
Qty: 60 TABLET | Refills: 0 | Status: SHIPPED | OUTPATIENT
Start: 2023-03-30 | End: 2023-04-29

## 2023-03-30 RX ORDER — FAMOTIDINE 20 MG/1
20 TABLET, FILM COATED ORAL 2 TIMES DAILY
Status: DISCONTINUED
Start: 2023-03-30 | End: 2023-04-01 | Stop reason: HOSPADM

## 2023-03-30 RX ORDER — MIDAZOLAM HYDROCHLORIDE 1 MG/ML
INJECTION, SOLUTION INTRAMUSCULAR; INTRAVENOUS AS NEEDED
Status: DISCONTINUED | OUTPATIENT
Start: 2023-03-30 | End: 2023-03-30 | Stop reason: HOSPADM

## 2023-03-30 RX ORDER — DIPHENHYDRAMINE HYDROCHLORIDE 50 MG/ML
12.5 INJECTION, SOLUTION INTRAMUSCULAR; INTRAVENOUS
Status: ACTIVE
Start: 2023-03-30 | End: 2023-03-31

## 2023-03-30 RX ORDER — POLYETHYLENE GLYCOL 3350 17 G/17G
17 POWDER, FOR SOLUTION ORAL DAILY
Qty: 7 PACKET | Refills: 0 | Status: SHIPPED
Start: 2023-03-31 | End: 2023-04-07

## 2023-03-30 RX ORDER — ACETAMINOPHEN 500 MG
1000 TABLET ORAL ONCE
Status: COMPLETED
Start: 2023-03-30 | End: 2023-03-30

## 2023-03-30 RX ORDER — OXYCODONE HYDROCHLORIDE 5 MG/1
5 TABLET ORAL
Status: DISCONTINUED
Start: 2023-03-30 | End: 2023-04-01 | Stop reason: HOSPADM

## 2023-03-30 RX ORDER — METFORMIN HYDROCHLORIDE EXTENDED-RELEASE TABLETS 500 MG/1
500 TABLET, FILM COATED, EXTENDED RELEASE ORAL
Status: DISCONTINUED
Start: 2023-03-30 | End: 2023-04-01 | Stop reason: HOSPADM

## 2023-03-30 RX ORDER — CELECOXIB 200 MG/1
200 CAPSULE ORAL ONCE
Status: COMPLETED
Start: 2023-03-30 | End: 2023-03-30

## 2023-03-30 RX ORDER — PROPOFOL 10 MG/ML
INJECTION, EMULSION INTRAVENOUS
Status: DISCONTINUED | OUTPATIENT
Start: 2023-03-30 | End: 2023-03-30 | Stop reason: HOSPADM

## 2023-03-30 RX ADMIN — WATER 2 G: 1 INJECTION INTRAMUSCULAR; INTRAVENOUS; SUBCUTANEOUS at 23:34

## 2023-03-30 RX ADMIN — Medication 1000 MG: at 06:53

## 2023-03-30 RX ADMIN — ACETAMINOPHEN 1000 MG: 500 TABLET ORAL at 17:17

## 2023-03-30 RX ADMIN — SODIUM CHLORIDE, PRESERVATIVE FREE 10 ML: 5 INJECTION INTRAVENOUS at 22:13

## 2023-03-30 RX ADMIN — KETOROLAC TROMETHAMINE 30 MG: 30 INJECTION, SOLUTION INTRAMUSCULAR at 17:17

## 2023-03-30 RX ADMIN — Medication 40 MCG: at 07:56

## 2023-03-30 RX ADMIN — Medication 40 MCG: at 07:50

## 2023-03-30 RX ADMIN — MIDAZOLAM 2.5 MG: 1 INJECTION INTRAMUSCULAR; INTRAVENOUS at 07:09

## 2023-03-30 RX ADMIN — Medication 80 MCG: at 08:38

## 2023-03-30 RX ADMIN — ERYTHROMYCIN: 5 OINTMENT OPHTHALMIC at 12:12

## 2023-03-30 RX ADMIN — SODIUM CHLORIDE, POTASSIUM CHLORIDE, SODIUM LACTATE AND CALCIUM CHLORIDE: 600; 310; 30; 20 INJECTION, SOLUTION INTRAVENOUS at 07:24

## 2023-03-30 RX ADMIN — DOCUSATE SODIUM 50MG AND SENNOSIDES 8.6MG 1 TABLET: 8.6; 5 TABLET, FILM COATED ORAL at 12:11

## 2023-03-30 RX ADMIN — ACETAMINOPHEN 1000 MG: 500 TABLET ORAL at 12:11

## 2023-03-30 RX ADMIN — PROPOFOL 100 MCG/KG/MIN: 10 INJECTION, EMULSION INTRAVENOUS at 07:32

## 2023-03-30 RX ADMIN — Medication 3 AMPULE: at 06:54

## 2023-03-30 RX ADMIN — DEXMEDETOMIDINE HYDROCHLORIDE 4 MCG: 100 INJECTION, SOLUTION, CONCENTRATE INTRAVENOUS at 07:41

## 2023-03-30 RX ADMIN — Medication 40 MCG: at 08:44

## 2023-03-30 RX ADMIN — ASPIRIN 325 MG: 325 TABLET, COATED ORAL at 17:17

## 2023-03-30 RX ADMIN — ERYTHROMYCIN: 5 OINTMENT OPHTHALMIC at 17:00

## 2023-03-30 RX ADMIN — PROPOFOL 50 MG: 10 INJECTION, EMULSION INTRAVENOUS at 07:32

## 2023-03-30 RX ADMIN — Medication 40 MCG: at 08:29

## 2023-03-30 RX ADMIN — FAMOTIDINE 20 MG: 20 TABLET, FILM COATED ORAL at 17:17

## 2023-03-30 RX ADMIN — WATER 2 G: 1 INJECTION INTRAMUSCULAR; INTRAVENOUS; SUBCUTANEOUS at 17:18

## 2023-03-30 RX ADMIN — TRANEXAMIC ACID 1 G: 100 INJECTION, SOLUTION INTRAVENOUS at 07:36

## 2023-03-30 RX ADMIN — BUPIVACAINE HYDROCHLORIDE 10 MG: 5 INJECTION, SOLUTION EPIDURAL; INTRACAUDAL; PERINEURAL at 07:13

## 2023-03-30 RX ADMIN — SODIUM CHLORIDE, POTASSIUM CHLORIDE, SODIUM LACTATE AND CALCIUM CHLORIDE 25 ML/HR: 600; 310; 30; 20 INJECTION, SOLUTION INTRAVENOUS at 06:31

## 2023-03-30 RX ADMIN — DEXAMETHASONE SODIUM PHOSPHATE 10 MG: 10 INJECTION INTRAMUSCULAR; INTRAVENOUS at 07:39

## 2023-03-30 RX ADMIN — MIDAZOLAM 1 MG: 1 INJECTION INTRAMUSCULAR; INTRAVENOUS at 07:27

## 2023-03-30 RX ADMIN — Medication 1 AMPULE: at 22:13

## 2023-03-30 RX ADMIN — POLYETHYLENE GLYCOL 3350 17 G: 17 POWDER, FOR SOLUTION ORAL at 12:12

## 2023-03-30 RX ADMIN — KETOROLAC TROMETHAMINE 30 MG: 30 INJECTION, SOLUTION INTRAMUSCULAR at 23:34

## 2023-03-30 RX ADMIN — WATER 2 G: 1 INJECTION INTRAMUSCULAR; INTRAVENOUS; SUBCUTANEOUS at 07:29

## 2023-03-30 RX ADMIN — ERYTHROMYCIN: 5 OINTMENT OPHTHALMIC at 22:12

## 2023-03-30 RX ADMIN — CELECOXIB 200 MG: 200 CAPSULE ORAL at 06:53

## 2023-03-30 RX ADMIN — ACETAMINOPHEN 1000 MG: 500 TABLET ORAL at 23:34

## 2023-03-30 RX ADMIN — SODIUM CHLORIDE 125 ML/HR: 9 INJECTION, SOLUTION INTRAVENOUS at 11:20

## 2023-03-30 RX ADMIN — FAMOTIDINE 20 MG: 20 TABLET, FILM COATED ORAL at 12:11

## 2023-03-30 RX ADMIN — Medication 40 MCG: at 08:41

## 2023-03-30 RX ADMIN — MIDAZOLAM 1.5 MG: 1 INJECTION INTRAMUSCULAR; INTRAVENOUS at 07:24

## 2023-03-30 RX ADMIN — Medication 1000 MCG: at 12:11

## 2023-03-30 RX ADMIN — METFORMIN HYDROCHLORIDE 500 MG: 500 TABLET, EXTENDED RELEASE ORAL at 17:17

## 2023-03-30 NOTE — PROGRESS NOTES
Problem: Mobility Impaired (Adult and Pediatric)  Goal: *Acute Goals and Plan of Care (Insert Text)  Description: FUNCTIONAL STATUS PRIOR TO ADMISSION: Patient was modified independent using a rollator vs bilateral SPC for functional mobility. HOME SUPPORT PRIOR TO ADMISSION: The patient lived with her niece who works during the day. She also has support from her best friend. Physical Therapy Goals  Initiated 3/30/2023    1. Patient will move from supine to sit and sit to supine , scoot up and down, and roll side to side in bed with modified independence within 4 days. 2. Patient will perform sit to stand with modified independence within 4 days. 3. Patient will ambulate with modified independence for 150 feet with the least restrictive device within 4 days. 4. Patient will ascend/descend 4 stairs with one sided handrail(s) with modified independence within 4 days. 5. Patient will verbalize and demonstrate understanding of anterior hip precautions per protocol within 4 days. 6. Patient will perform hip home exercise program per protocol with modified independence within 4 days. Outcome: Not Met   PHYSICAL THERAPY EVALUATION  Patient: Shara Haleytingham (63 y.o. female)  Date: 3/30/2023  Primary Diagnosis: Primary localized osteoarthrosis of right hip [M16.11]  Unilateral primary osteoarthritis, right hip [M16.11]  Procedure(s) (LRB):  RIGHT TOTAL HIP ARTHROPLASTY (Right) Day of Surgery   Precautions:   WBAT (anterior hip)    ASSESSMENT  Based on the objective data described below, the patient presents with decreased strength, functional mobility, elevated HR, likely mildly unsteady gait, and expected post op pain s/p R anterior PORTER POD 0. Patient is functioning below her baseline due to post op pain and generalized weakness. Educated on post op expectations with therapy. Once standing, patient's HR elevated to 160 bpm and patient symptomatic.  Did not progress mobility and returned to supine with min A for RLE management. Ortho NP informed and aware. Anticipate once HR controlled, patient will progress quickly with therapy due to young age and independent PLOF. Current Level of Function Impacting Discharge (mobility/balance): CGA-min A with RW    Functional Outcome Measure: The patient scored 19/24 on the [facility] outcome measure which is indicative of needs continued therapy services. Other factors to consider for discharge: HR elevated     Patient will benefit from skilled therapy intervention to address the above noted impairments. PLAN :  Recommendations and Planned Interventions: bed mobility training, transfer training, gait training, therapeutic exercises, patient and family training/education, and therapeutic activities      Frequency/Duration: Patient will be followed by physical therapy:  twice daily to address goals. Recommendation for discharge: (in order for the patient to meet his/her long term goals)  Physical therapy at least 2 days/week in the home with support    This discharge recommendation:  Has been made in collaboration with the attending provider and/or case management    IF patient discharges home will need the following DME: rolling walker? Owns rollator         SUBJECTIVE:   Patient stated I can feel my heart racing. It has been like that for a while but I have just dismissed it.     OBJECTIVE DATA SUMMARY:   HISTORY:    Past Medical History:   Diagnosis Date    Arthritis     hips    Spinal stenosis      Past Surgical History:   Procedure Laterality Date    HX  SECTION      x2 -,     HX HEENT      jaw surgery    HX HERNIA REPAIR  2021    Dr. Guanakito Martel LAPAROSCOPIC SUPRACERVICAL HYSTERECTOMY      HX TUBAL LIGATION         Personal factors and/or comorbidities impacting plan of care: elevated HR    Home Situation  Home Environment: Private residence  # Steps to Enter: 4  Rails to Enter: Yes  Hand Rails : Bilateral (too wide)  One/Two Story Residence: One story  Living Alone: No  Support Systems: Other Family Member(s), Friend/Neighbor (niece and best friend)  Patient Expects to be Discharged to[de-identified] Home with home health  Current DME Used/Available at Home: Francesco Berry, rachelle, Kate Beal, rollator, Shower chair, Raised toilet seat (shower chair too large for shower, reacher)  Tub or Shower Type: Tub/Shower combination    EXAMINATION/PRESENTATION/DECISION MAKING:   Critical Behavior:  Neurologic State: Appropriate for age  Orientation Level: Oriented X4  Cognition: Appropriate decision making     Hearing:     Skin:  dressing intact  Edema: none  Range Of Motion:  AROM: Generally decreased, functional                       Strength:    Strength: Generally decreased, functional                    Tone & Sensation:   Tone: Normal              Sensation: Intact               Coordination:  Coordination: Within functional limits  Vision:      Functional Mobility:  Bed Mobility:  Rolling: Contact guard assistance  Supine to Sit: Contact guard assistance  Sit to Supine: Minimum assistance (for RLE management)  Scooting: Contact guard assistance  Transfers:  Sit to Stand: Contact guard assistance  Stand to Sit: Contact guard assistance                       Balance:   Sitting: Intact; Without support  Standing: Impaired; With support  Standing - Static: Good  Standing - Dynamic : Good  Ambulation/Gait Training:              Gait Description (WDL):  (not tested due to elevated HR)                                     Therapeutic Exercises: Will provide hip HEP next session. Functional Measure:  Rosalinda Duran AM-PAC®      Basic Mobility Inpatient Short Form (6-Clicks) Version 2  How much HELP from another person do you currently need. .. (If the patient hasn't done an activity recently, how much help from another person do you think they would need if they tried?) Total A Lot A Little None   1.   Turning from your back to your side while in a flat bed without using bedrails? []  1 []  2 []  3  [x]  4   2. Moving from lying on your back to sitting on the side of a flat bed without using bedrails? []  1 []  2 [x]  3  []  4   3. Moving to and from a bed to a chair (including a wheelchair)? []  1 []  2 [x]  3  []  4   4. Standing up from a chair using your arms (e.g. wheelchair or bedside chair)? []  1 []  2 [x]  3  []  4   5. Walking in hospital room? []  1 []  2 [x]  3  []  4   6. Climbing 3-5 steps with a railing? []  1 []  2 [x]  3  []  4     Raw Score: 19/24                            Cutoff score ?171,2,3 had higher odds of discharging home with home health or need of SNF/IPR. [address], Bess Cardona, Fabby Chas Whitmaning. Validity of the AM-PAC 6-Clicks Inpatient Daily Activity and Basic Mobility Short Forms. Physical Therapy Mar 2014, 94 3) [phone]; DOI: 10.2522/ptj.[id number]  2. Altjuanase Sol. Association of AM-PAC \"6-Clicks\" Basic Mobility and Daily Activity Scores With Discharge Destination. Phys Ther. 2021 Apr 4;101(4):xqxg668. doi: 10.1093/ptj/ajgf207. PMID: 00358710. V Jhoan Guzman, Miguelina D, Tonia Laurent, [place] K, Prieto S. Activity Measure for Post-Acute Care \"6-Clicks\" Basic Mobility Scores Predict Discharge Destination After Acute Care Hospitalization in Select Patient Groups: A Retrospective, Observational Study. Arch Rehabil Res Clin Transl. 2022 Jul 16;4(3):752046. doi: 10.1016/j.arrct. [id number]. PMID: 63108407; PMCID: [id number]. 4. Madai Velasco, Sari TURNER. AM-PAC Short Forms Manual 4.0. Revised 2/2020.          Physical Therapy Evaluation Charge Determination   History Examination Presentation Decision-Making   LOW Complexity : Zero comorbidities / personal factors that will impact the outcome / POC MEDIUM Complexity : 3 Standardized tests and measures addressing body structure, function, activity limitation and / or participation in recreation  MEDIUM Complexity : Evolving with changing characteristics  Other outcome measures [facility]  MEDIUM      Based on the above components, the patient evaluation is determined to be of the following complexity level: LOW     Pain Rating:  Expected post op R hip pain. Activity Tolerance:   Fair and SpO2 stable on RA    After treatment patient left in no apparent distress:   Supine in bed, Call bell within reach, Caregiver / family present, and Side rails x 3    COMMUNICATION/EDUCATION:   The patients plan of care was discussed with: Registered nurse and Rehabilitation technician. Fall prevention education was provided and the patient/caregiver indicated understanding., Patient/family have participated as able in goal setting and plan of care. , and Patient/family agree to work toward stated goals and plan of care.     Thank you for this referral.  Felicitas Goss, PT, DPT   Time Calculation: 33 mins

## 2023-03-30 NOTE — PROGRESS NOTES
Ortho / Neurosurgery NP Note    POD# 0  s/p RIGHT TOTAL HIP ARTHROPLASTY   Pt seen with Dr. Robin Oneil earlier today. Pt assisted to bsc, tolerating activity well, denies dizziness. Reports minimal postop pain, aware of prn oxycodone (tolerated in past)  Currently feels her postop pain is significantly better than preop pain   C/o left eye irrigation and tearing, possible abrasion during surgery, drops at bedside. Tolerating regular diet. No nausea     VSS Afebrile. Room air. Visit Vitals  /83   Pulse 87   Temp 98.2 °F (36.8 °C)   Resp 19   Ht 5' 3\" (1.6 m)   Wt 113 kg (249 lb 1.9 oz)   LMP 02/07/2011   SpO2 99%   BMI 44.13 kg/m²       Voiding status: voiding   Output (mL)  Urine Voided: 500 ml (03/30/23 1431)  Last Bowel Movement Date: 03/29/23 (03/30/23 0644)      Labs    Lab Results   Component Value Date/Time    HGB 14.3 03/21/2023 10:00 AM      Lab Results   Component Value Date/Time    INR 1.0 03/21/2023 10:00 AM      Lab Results   Component Value Date/Time    Sodium 141 03/21/2023 10:00 AM    Potassium 3.7 03/21/2023 10:00 AM    Chloride 112 (H) 03/21/2023 10:00 AM    CO2 24 03/21/2023 10:00 AM    Glucose 108 (H) 03/21/2023 10:00 AM    BUN 13 03/21/2023 10:00 AM    Creatinine 0.76 03/21/2023 10:00 AM    Calcium 9.6 03/21/2023 10:00 AM     Recent Glucose Results:   Lab Results   Component Value Date/Time    GLUCPOC 117 03/30/2023 07:03 AM           Body mass index is 44.13 kg/m². : A BMI > 30 is classified as obesity and > 40 is classified as morbid obesity. TEODORA dressing c. d. I - pt education provided   Cryotherapy in place over incision  Calves soft and supple; No pain with passive stretch  Sensation and motor intact.  +PF/DF/EHL intact   SCDs for mechanical DVT proph while in bed     PLAN:  1) PT BID, OT - WBAT   2) Aspirin 325 mg PO BID for DVT Prophylaxis   3) GI Prophylaxis - Pepcid  4) Pain control - scheduled tylenol  and toradol, and prn  tramadol and oxycodone    5) Readniess for discharge:     [x] Vital Signs stable    [] Hgb stable    [x] + Voiding    [x] Wound intact, drainage minimal    [x] Tolerating PO intake     [] Cleared by PT (OT if applicable)     [] Stair training completed (if applicable)    [] Independent / Contact Guard Assist (household distance)     [] Bed mobility     [] Car transfers     [] ADLs    [x] Adequate pain control on oral medication alone     She would feel more comfortable spending the night. Anticipate discharge home tomorrow morning with [place] and friends' support. Addendum @ 15:30 - HR elevated while working with PT in room, HR 160s, pt reports palpitations and feels her \"heart is racing\". BP stable. Reports similar episodes at home which have been occurring for about a year, has not seek treatment for, thought it was related to decreased endurance from osvaldo hip OA. Denies CP, SOB or dizziness. HR slowly recovered with rest. EKG obtained after HR recovered to 97, NSR. Tele monitoring overnight.      Enrique Ortega, NICO

## 2023-03-30 NOTE — PROGRESS NOTES
Occupational Therapy    OT order received, chart reviewed and spoke with evaluating PT. Per PT patient reports she has requested to stay in the hospital overnight and the patient's HR was tachycardic to the 160s during the PT evaluation. Will defer today and follow up for OT evaluation tomorrow, POD 1 for R PORTER.      Gt Watson, OTR/L

## 2023-03-30 NOTE — ANESTHESIA PROCEDURE NOTES
Spinal Block    Start time: 3/30/2023 7:09 AM  End time: 3/30/2023 7:13 AM  Performed by: Imer Pepe MD  Authorized by: Imer Pepe MD     Pre-procedure:   Indications: at surgeon's request and primary anesthetic  Preanesthetic Checklist: patient identified, risks and benefits discussed, anesthesia consent, site marked, patient being monitored, timeout performed and fire risk safety assessment completed and verbalized    Timeout Time: 07:09 EDT      Spinal Block:   Patient Position:  Seated  Prep Region:  Lumbar  Prep: DuraPrep      Location:  L3-4  Technique:  Single shot      Med Admin Time: 3/30/2023 7:13 AM    Needle:   Needle Type:  Pencan  Needle Gauge:  24 G  Attempts:  1      Events: CSF confirmed, no blood with aspiration and no paresthesia        Assessment:  Insertion:  Uncomplicated  Patient tolerance:  Patient tolerated the procedure well with no immediate complications

## 2023-03-30 NOTE — DISCHARGE INSTRUCTIONS
Discharge Instructions:  Cortney Dickey    Surgery: RIGHT TOTAL HIP ARTHROPLASTY  Surgeon:   Dr. Tosin Bernal  Surgery Date:  3/30/2023    To relieve pain:  Use ice/gel packs.    -Put the ice pack directly over the wound, or anywhere you are hurting or swollen.   -To control pain and swelling, keep ice on regularly, especially after physical activity.  -The packs should stay cold for 3-4 hours. When it is not cold anymore, rotate with the packs in the freezer. Elevate your leg. This will also keep swelling down. Rest for at least 20 minutes between activity or exercises. To keep track of your pain medications, write down what you take and when you take it. The last dose of pain medication you got in the hospital was:     Medication    Dose    Date & Time      Choose your medications based on the pain scale below: To keep your pain under control, take Tylenol every 6 hours for 14 days - even if you feel like you dont need it. For mild to moderate pain (4-6 on pain scale), take one pain pill every 4 hours or as instructed. For severe pain (7-10 on pain scale), take two pain pills every 4 hours or as instructed. To prevent nausea, take your pain medications with food. Pain Scale              As your pain lessens:    Slowly start taking less pain medication. You may do this by waiting longer between doses or by taking smaller doses. Stop using the pain medications as soon as you no longer need it, usually in 2-3 weeks. Aspirin  To prevent blood clots, you will need to take Aspirin 325 mg twice a day for 30 days. To prevent stomach upset or bleeding:  Do not take non-steroidal anti-inflammatory medications (Ibuprofen, Advil, Motrin, Naproxen, etc.)   Take Pepcid 20 mg twice a day, or a similar home medication, while you are taking a blood thinner.          Negative Pressure Dressing (TEODORA) - It will be removed by your surgeon during your follow-up appointment in 1 week. The dressing is waterproof, however the battery pack is not waterproof. Avoid getting the battery pack wet. You will have some swelling, warmth, and bruising around the incision and up and down the leg after surgery. This will may get worse in the first few days you are home and will slowly get better over the next few weeks. DO NOT's:  Do not rub your surgical wound  Do not put lotion or oils on your wound. Do not take a shower, tub bath or go swimming until your doctor says it is ok. To increase and promote healing:  Stop Smoking (or at least cut back on smoking). Eat a well-balanced diet. High in protein and Vitamin C. If you have a poor appetite, supplement your diet by drinking Ensure, Glucerna or [place] Instant Breakfast for the next 30 days     If you are a diabetic, control you blood sugars to prevent infection and help your wound heal.                     Prevent Infection:    Wash your hands                       -This is the most important thing you or your caregiver can do. -Wash your hands with soap and water (or use the hand ) before you touch any wounds. Shower  -Use the antibacterial soap we gave you when you take a shower.   -Shower with this soap until your wounds are healed. Use Clean Sheets   -Put freshly cleaned sheets on your bed after surgery.   -To keep the surgery site clean, do not allow pets to sleep with you while your wound is still healing. To prevent constipation, stay active and drink plenty of fluid. While using pain medications, you should also take stool softeners and laxatives, such as Pericolace and Miralax. If you are having too many bowel movements, then you may need to stop taking the laxatives. You should have a bowel movement 3-4 days after surgery and then at least every other day while on pain medication.         To improve your recovery, you must be active! Use your walker and take short walks (in your home) about every 2 hours during the day. Try to increase how far you walk each day. You can put as much weight on your leg as you can tolerate while walking. Home health physical therapy will come to your home the day after you leave the hospital. The therapist will visit about 4 times over the next couple of weeks to teach you exercises, how to get out of bed and how to safely walk in your home. NO DRIVING until your surgeon tells you it is ok. You can return to work when cleared by a physician. Please call your physician immediately if you have:  Constant bleeding from your wound. Increasing redness or swelling around your wound (some warmth, bruising and swelling is normal). Change in wound drainage (increase in amount, color, or bad smell). Change in mental status (unusual behavior). Temperature over 101.5 degrees Fahrenheit   Pain or redness in the calf (back of your lower leg)  Increased swelling of the thigh, ankle, calf, or foot. Emergency! CALL 911 if you have:  Shortness of breath  Chest pain when you cough or taking a deep breath        Please call your surgeons office to make your follow up appointment in 2 weeks. If you have questions or concerns during normal business hours, you may reach Dr. Steven Minaya Team at [phone].                              Instructions for 24 hours after receiving General Anesthesia or Intravenous Sedation,   and while taking prescription Narcotics    Common side effects associated with each of these medications includes:  - Drowsiness, dizziness, euphoria, sleepiness or confusion  - Impaired memory recall  - Unsteady gait, loss of fine muscle control and delayed reaction time  - Visual disturbances, difficulty focusing, blurred vision    You may experience some of these side effects or you may not be aware of subtle changes in your behavior or reaction time.  Because you received these medications, we are giving you the following instructions. Discharge Instructions:   - Someone should be with you for the next 24 hours  - For your own safety, a responsible adult must drive you home  - Do not consume alcoholic beverages   - Do not make important personal, legal or business decisions for 24 hours  - Move slowly and carefully, do not make sudden position changes. Be alert for dizziness or lightheadedness and move accordingly. - Do not operate equipment for 24 hours - [employer], power tools, Kitchen accessories: stove, etc.  - If you have not urinated within 8 hours after discharge, please contact your surgeon's office.

## 2023-03-30 NOTE — ANESTHESIA POSTPROCEDURE EVALUATION
Procedure(s):  RIGHT TOTAL HIP ARTHROPLASTY. spinal, general - backup    Anesthesia Post Evaluation      Multimodal analgesia: multimodal analgesia used between 6 hours prior to anesthesia start to PACU discharge  Patient location during evaluation: bedside  Patient participation: complete - patient participated  Level of consciousness: awake  Pain management: adequate  Airway patency: patent  Anesthetic complications: no  Cardiovascular status: acceptable  Respiratory status: acceptable  Hydration status: acceptable  Post anesthesia nausea and vomiting:  controlled  Final Post Anesthesia Temperature Assessment:  Normothermia (36.0-37.5 degrees C)      INITIAL Post-op Vital signs:   Vitals Value Taken Time   /57 03/30/23 1030   Temp 36.5 °C (97.7 °F) 03/30/23 0909   Pulse 76 03/30/23 1035   Resp 15 03/30/23 1035   SpO2 99 % 03/30/23 1035   Vitals shown include unvalidated device data.

## 2023-03-30 NOTE — PERIOP NOTES
No cocvid  tet no s/s/   took   vacine . No mepilex  applied   as this  will be a spinal    .   Skin intact. Pt   has  numerous    scratches to body    from her cat. None  at surgical site. Iv started    and   t    received   1000cc before the   spinal   dr. Bo Linares aware. Pt   voided in bthroom   once   durning   holding area    stay. Versed 2.5cc iv   given by dr. Bo Linares  he has the remainder    narcotic. Tylenol and  celebrex   administered   per orders. See mar. Mepilex to be applied   in room   by or   nurse  . pt   valuables   left   in lobby   with  male   friend     before she came   back to    holding   area. They  will be given to    friend  coming. He will wait until they show  up  they report. Pt   only   has her clothing   and glasses   in holding   area   sent to recovery room    as  pt   wishes.

## 2023-03-30 NOTE — PERIOP NOTES
[address] from Operating Room to PACU    Report received from [address] and [address] regarding Ahsan Rebolledo. Surgeon(s):  Tiffanie Collins DO  And Procedure(s) (LRB):  RIGHT TOTAL HIP ARTHROPLASTY (Right)  confirmed   with allergies and dressings discussed. Anesthesia type, drugs, patient history, complications, estimated blood loss, vital signs, intake and output, and last pain medication, lines, and temperature were reviewed. 0920- Xray complete. 0360- Patient C/O left eye pain states \"feels like sand in eye. Eye red but nothing noted in eye Dr.Mobarakeh olegario MD at bedside. New orders noted. 1050- TRANSFER - OUT REPORT:    Verbal report given to Ramiro Cherry RN(name) on Ahsan Rebolledo  being transferred to [facility](unit) for routine post - op       Report consisted of patients Situation, Background, Assessment and   Recommendations(SBAR). Information from the following report(s) OR Summary, Procedure Summary, Intake/Output, and MAR was reviewed with the receiving nurse. Lines:   Peripheral IV 03/30/23 Left;Posterior Hand (Active)   Site Assessment Clean, dry, & intact 03/30/23 1045   Phlebitis Assessment 0 03/30/23 1045   Infiltration Assessment 0 03/30/23 1045   Dressing Status Clean, dry, & intact 03/30/23 1045   Dressing Type Tape;Transparent 03/30/23 1045   Hub Color/Line Status Pink;Capped 03/30/23 1045        Opportunity for questions and clarification was provided.       Patient transported with:   O2 @ 2 liters  Tech  Chart  Personal belongings   Patient friend updated on status and new room number

## 2023-03-30 NOTE — PROGRESS NOTES
Evaluated pt for complaints of left eye pain. Eye appears injected. Pt states that it feels like there's a foreign body in her eye. Denies photophobia. Vision is normal out of the eye. Pt likely has a corneal abrasion. Will administer erythromycin ophthalmic ointment. Instructed pt to use TID for 3-5 days.  Informed pt that eye should feel better after a couple days, but if there is no improvement after 3 days she should see an ophthalmologist.

## 2023-03-30 NOTE — OP NOTES
Date of Procedure: 3/30/2023  PREOPERATIVE DIAGNOSIS: Right hip osteoarthritis. POSTOPERATIVE DIAGNOSIS: same  OPERATION: Right total hip arthroplasty. ASSISTANT SURGEON: none  ANESTHESIA: spinal.  ESTIMATED BLOOD LOSS: 300 mL. COMPLICATIONS: None. SPECIMEN: None. DRAINS: None. IMPLANTS:     [employer]igna Trident hemispherical acetabular shell 52 mm outer diameter   [place] trident X3 0 degree polyethylene insert 36 mm inner diameter   Biolox Delta ceramic v40 femoral head 36 mm outer diameter  +5 Neck length   Accolade 2, 127 degree neck angle, hip stem size 3 with a V40 taper. Two 6.5 mm bone screws were added, 15 mm and 30 mm. OPERATIVE INDICATIONS: This is a(an) 48 y.o. female who failed  nonoperative management of right hip osteoarthritis. We did discuss the risks  of surgery which include but are not limited to infection, nerve or blood  vessel damage, need for reoperation, disability, DVT, PE, postoperative pain,  swelling, stiffness, intra or postoperative fracture, leg length inequality    and postoperative dislocation, femoral and sciatic nerve palsies, lateral femoral  cutaneous nerve injury, wound healing complications, all other organ  disfunction. The patient did freely consent for surgery. DESCRIPTION OF PROCEDURE IN DETAIL: After the correct side and site were  identified by myself in the holding area, the patient was transported to the  surgical suite where after successful induction of spinal anesthesia, the  patient was transferred to the [facility] table where all bony prominences were  padded. The right hip was then prepped and draped in usual sterile orthopedic  fashion. After an appropriate time out and confirmation the 2 grams of Ancef  had been infused prior to any incision, an incision was made beginning 2 cm  lateral to and distal to the ASIS directly laterally and distally by about 8  cm.  The tissues were dissected sharply down to fascia and strict hemostasis  was maintained with the use of electrocautery. The deep fascia was incised  sharply and the tensor of the fascia torey was bluntly elevated and mobilized. A superolateral retractor was then placed. The deep fascia was divided and the ascending branch of the lateral femoral circumflex artery was identified, coagulated and divided. An inferior medial retractor was then placed giving good visualization of the anterior  capsule. The iliocapsularis was then elevated off of the anterior capsule  and an anterior column retractor was placed. A capsulectomy was performed  anteriorly. Once the capsulectomy was performed,  retractors were placed intra-articularly and an osteotomy was performed in  the femoral neck in line with the preoperative template. Once this was  performed, the femoral head was removed with use of a powered corkscrew. The retractors were placed inside of capsule, outside of labrum and the  circumferential labrectomy was performed. The Pulvinar was excised with use  of electrocautery. The medial tissues were infused with 0.5% Ropivacaine. Once this was performed, a small reamer was then used to medialize  the acetabulum to its true floor. With that, the reamer was increased in  size and then placed in the direction of the anatomic direction of the  acetabulum. Further reaming was performed and care was taken to maintain good medial acetabular wall integrity. I reamed until the instrument had excellent  as well as a bed of bleeding cancellous bone. I, therefore, irrigated the acetabular recess and dried it with a lap and  impacted into place a final shell into place at   43 degrees of abduction as well as 15 degrees of anteversion under direct fluoroscopic visualization. I checked stability of the shell. It was excellent. For added stability, I then placed two 6.5 mm screws in the acetabular safe zone for added shell stability. With that, the  stability was tested and it was found to have an excellent scratch fit. I  therefore irrigated the wound and impacted into a place a 0 degree  polyethylene insert. This did have excellent engagement of the locking  mechanism. This was tested as well. The retractors were then removed and the leg was placed in extended, adducted, externally rotated position and retractors were placed about the proximal femur. The limited capsulotomy was performed at the greater trochanter and this did allow the proximal femur to  elevate up and out of the wound. I then used the box osteotome to clear off  metaphysical bone and the lateral cortical bone was then removed with the use of a rongeur. The canal entry broach was then used. Sequential broaching was then performed until I had good axial and rotational control. I used the calcar planer to clear off irregularities in the calcar. I trial reduced the hip with a 0 degree ball. X-rays were taken and  demonstrated excellent positioning of the femoral component. I redislocated the joint. I removed  the head and neck as well as the broach and impacted into place a  Final permanent Accolade 2 127 degree neck angle hip stem. This did have excellent axial and  rotational control. The calcar was checked and there were no cracks or other  bone deficiencies. I, therefore, trialed a ball which did have excellent  reconstitution of the length and offset. I, therefore, redislocated, cleaned  and dried the trunnion and impacted into place our final ball with seven sharp blows of the mallet. This did have excellent fixation. I therefore relocated the joint. Final  x-rays were taken demonstrating good positioning of all components as well as  stability and no fractures were noted. Stability testing was performed with the leg at neutral, then externally rotated 90 degrees, then externally rotated at 45 degrees combined with leg extension. The hip was stable through this range of motion.  The wound was then copiously  irrigated with normal saline mixed with Betadine. Soft tissues were then  infused with 0.5% Ropivacaine. The fascia was then closed with a running #1  Vicryl suture. Deep stitches were placed. Then, 2-0 Vicryl, 3-0 Monocryl,  Dermabond, and Steri-Strips were applied. A sterile dressing was applied. The patient was then taken off of the table and taken to PACU in stable  condition with no obvious intraoperative complications. POSTOPERATIVE PLAN: The patient will be weightbearing as tolerated. They will  have ecotrin for DVT prophylaxis for  1 month. The patient will follow up in our office in 2 weeks and then 6 weeks for repeat clinical and  radiographic checks per our normal protocol.

## 2023-03-30 NOTE — H&P
Chief Complaint: Right hip pain     Assessment: Unilateral primary osteoarthritis of the right hip  (M16.11)      Plan: This patient and I did have a long discussion regarding the treatment options. Nonoperative management was discussed at length, continued pain control, physical therapy, injections, ambulatory aids, and weight loss. I did speak with the patient specifically that there are always some nonoperative options, and that surgery would be elective on their part. We did discuss the risks of surgery which include but are not limited to infection, nerve or blood vessel damage, femoral, lateral femoral cutaneous nerve injury, sciatic nerve injury, failure of fixation, failure of any possible implant, need for reoperation, postoperative pain and swelling, intra-or postoperative fracture, postoperative dislocation, leg length inequality, need for reoperation, implant failure, death, disability, organ dysfunction, wound healing issues, DVT, PE, and the need for further procedures. The patient did freely state their understanding and satisfaction with our discussion. We will proceed with a total hip arthroplasty after another 18 lbs weight loss and medical clearances. The patient was counseled at length about the risks of jeremie Covid-19 during their perioperative period and any recovery window from their procedure. The patient was made aware that jeremie Covid-19  may worsen their prognosis for recovering from their procedure and lend to a higher morbidity and/or mortality risk. All material risks, benefits, and reasonable alternatives including postponing the procedure were discussed. The patient does  wish to proceed with the procedure at this time. HPI: This is a 48 y. o.female who complains of right hip pain which is activity dependent. The patient has had activity dependent pain for years. The patient has tried activity modification, no physical therapy, injections have failed. The pain is in the groin , it is severe in intensity. The patient fears falling, walks with a limp, and feels as though all nonoperative management has failed. The patient denies any numbness, weakness, tingling, fevers, chills, nausea, vomiting, fevers, chills, nausea, vomiting. Past Medical History:   Diagnosis Date    Spinal stenosis                Past Surgical History:   Procedure Laterality Date    HX  SECTION         x2    HX GYN        HX HERNIA REPAIR   2021     Dr. Cassie Otero LAPAROSCOPIC SUPRACERVICAL HYSTERECTOMY       HX OTHER SURGICAL         jaw surgery    HX TUBAL LIGATION                    Current Outpatient Medications on File Prior to Visit   Medication Sig Dispense Refill    metFORMIN ER (GLUCOPHAGE XR) 500 mg tablet Take 1 Tablet by mouth daily (with dinner). 30 Tablet 0    diclofenac EC (VOLTAREN) 75 mg EC tablet TAKE 1 TABLET BY MOUTH TWO TIMES A DAY. 60 Tablet 0    semaglutide (OZEMPIC) 0.25 mg or 0.5 mg/dose (2 mg/1.5 ml) subq pen Inject SQ 0.25 mg once weekly for 4 weeks then increase to 0.5 mg once weekly (Patient not taking: Reported on 2023) 1 Box 1      No current facility-administered medications on file prior to visit.                Allergies   Allergen Reactions    Other Food Swelling       Allergy to jicama root               Family History   Problem Relation Age of Onset    Breast Cancer Maternal Aunt           age unknown         Social History            Socioeconomic History    Marital status: SINGLE   Tobacco Use    Smoking status: Never    Smokeless tobacco: Never   Vaping Use    Vaping Use: Never used   Substance and Sexual Activity    Alcohol use: Yes       Comment: occasionally    Drug use: No    Sexual activity: Yes       Partners: Male       Birth control/protection: Surgical            Review of Systems:         General: Denies headache, lethargy, fever, weight loss  Ears/Nose/Throat: Denies ear discharge, drainage, nosebleeds, hoarse voice, dental problems  Cardiovascular: Denies chest pain, shortness of breath  Lungs: Denies chest pain, breathing problems, wheezing, pneumonia  Stomach: Denies stomach pain, heartburn, constipation, irritable bowel  Skin: Denies rash, sores, open wounds  Musculoskeletal: Admits to right hip pain  Genitourinary: Denies dysuria, hematuria, polyuria  Gastrointestinal: Denies constipation, obstipation, diarrhea  Neurological: Denies changes in sight, smell, hearing, taste, seizures. Denies loss of consciousness. Psychiatric: Denies depression, sleep pattern changes, anxiety, change in personality  Endocrine: Denies mood swings, heat or cold intolerance  Hematologic/Lymphatic: Denies anemia, purpura, petechia  Allergic/Immunologic: Denies swelling of throat, pain or swelling at lymph nodes        Physical Examination:        General: AOX3, no apparent distress  Psychiatric: mood and affect appropriate  Lungs: clear to auscultation bilaterally  Heart: regular rate and rhythm  Abdomen: no guarding  Head: normocephalic, atraumatic  Skin: No significant abnormalities, good turgor  Sensation intact to light touch: L1-S1 dermatomes  Muscular exam: 5/5 strength in all major muscle groups unless noted in specialty exam.    Extremities        Left upper extremity: Full active and passive range of motion without pain, deformity, no open wound, strength 5/5 in all major muscle groups. Right upper extremity: Full active and passive range of motion without pain, deformity, no open wound, strength 5/5 in all major muscle groups. Left lower extremity: Full active and passive range of motion without pain, deformity, no open wound, strength 5/5 in all major muscle groups. Right lower extremity:  No deformity is noted. Circumduction of the hip causes significant pain in the groin, reproductive of the chief complaint. Range of motion is limited, with a positive impingement sign.   YULIA test causes some pain in the groin.  Gait is antalgic. Slight tenderness to palpation on the lateral aspect of the hip. Sensation is intact to light touch in the L1-S1 dermatomes. Skin is intact about the hip. Hip flexion and abduction strength is 4+/5, hip extension and knee extension as well as tibialis anterior and EHL/GCS are 5/5 strength. Diagnostics:     Pertinent Xrays:  Pelvis and hip xrays indicate severe osteoarthritis of the right hip. There are osteophytes at the femoral neck and head, as well as subchondral sclerosis of the acetabular rim.

## 2023-03-31 ENCOUNTER — HOME HEALTH ADMISSION (OUTPATIENT)
Dept: HOME HEALTH SERVICES | Facility: HOME HEALTH | Age: 53
End: 2023-03-31
Payer: COMMERCIAL

## 2023-03-31 LAB
ANION GAP SERPL CALC-SCNC: 4 MMOL/L (ref 5–15)
ATRIAL RATE: 97 BPM
BUN SERPL-MCNC: 16 MG/DL (ref 6–20)
BUN/CREAT SERPL: 22 (ref 12–20)
CALCIUM SERPL-MCNC: 9 MG/DL (ref 8.5–10.1)
CALCULATED P AXIS, ECG09: 34 DEGREES
CALCULATED R AXIS, ECG10: 23 DEGREES
CALCULATED T AXIS, ECG11: 40 DEGREES
CHLORIDE SERPL-SCNC: 112 MMOL/L (ref 97–108)
CO2 SERPL-SCNC: 24 MMOL/L (ref 21–32)
CREAT SERPL-MCNC: 0.72 MG/DL (ref 0.55–1.02)
DIAGNOSIS, 93000: NORMAL
GLUCOSE SERPL-MCNC: 132 MG/DL (ref 65–100)
HGB BLD-MCNC: 12.8 G/DL (ref 11.5–16)
P-R INTERVAL, ECG05: 170 MS
POTASSIUM SERPL-SCNC: 4 MMOL/L (ref 3.5–5.1)
Q-T INTERVAL, ECG07: 344 MS
QRS DURATION, ECG06: 78 MS
QTC CALCULATION (BEZET), ECG08: 436 MS
SODIUM SERPL-SCNC: 140 MMOL/L (ref 136–145)
VENTRICULAR RATE, ECG03: 97 BPM

## 2023-03-31 PROCEDURE — 97165 OT EVAL LOW COMPLEX 30 MIN: CPT | Performed by: OCCUPATIONAL THERAPIST

## 2023-03-31 PROCEDURE — 36415 COLL VENOUS BLD VENIPUNCTURE: CPT

## 2023-03-31 PROCEDURE — 97110 THERAPEUTIC EXERCISES: CPT

## 2023-03-31 PROCEDURE — 74011250637 HC RX REV CODE- 250/637: Performed by: ORTHOPAEDIC SURGERY

## 2023-03-31 PROCEDURE — 80048 BASIC METABOLIC PNL TOTAL CA: CPT

## 2023-03-31 PROCEDURE — 97535 SELF CARE MNGMENT TRAINING: CPT | Performed by: OCCUPATIONAL THERAPIST

## 2023-03-31 PROCEDURE — 74011000250 HC RX REV CODE- 250: Performed by: ORTHOPAEDIC SURGERY

## 2023-03-31 PROCEDURE — 85018 HEMOGLOBIN: CPT

## 2023-03-31 PROCEDURE — 97530 THERAPEUTIC ACTIVITIES: CPT

## 2023-03-31 PROCEDURE — G0378 HOSPITAL OBSERVATION PER HR: HCPCS

## 2023-03-31 PROCEDURE — 77030041034 HC KT HIP PATT -B

## 2023-03-31 PROCEDURE — 96374 THER/PROPH/DIAG INJ IV PUSH: CPT

## 2023-03-31 PROCEDURE — 74011250636 HC RX REV CODE- 250/636: Performed by: ORTHOPAEDIC SURGERY

## 2023-03-31 PROCEDURE — 97116 GAIT TRAINING THERAPY: CPT

## 2023-03-31 RX ADMIN — FAMOTIDINE 20 MG: 20 TABLET, FILM COATED ORAL at 17:20

## 2023-03-31 RX ADMIN — SODIUM CHLORIDE, PRESERVATIVE FREE 10 ML: 5 INJECTION INTRAVENOUS at 23:00

## 2023-03-31 RX ADMIN — ACETAMINOPHEN 1000 MG: 500 TABLET ORAL at 11:05

## 2023-03-31 RX ADMIN — MAGNESIUM OXIDE TAB 400 MG (241.3 MG ELEMENTAL MG) 400 MG: 400 (241.3 MG) TAB at 08:05

## 2023-03-31 RX ADMIN — ACETAMINOPHEN 1000 MG: 500 TABLET ORAL at 23:09

## 2023-03-31 RX ADMIN — ACETAMINOPHEN 1000 MG: 500 TABLET ORAL at 17:20

## 2023-03-31 RX ADMIN — Medication 1000 MCG: at 08:04

## 2023-03-31 RX ADMIN — METFORMIN HYDROCHLORIDE 500 MG: 500 TABLET, EXTENDED RELEASE ORAL at 17:20

## 2023-03-31 RX ADMIN — ASPIRIN 325 MG: 325 TABLET, COATED ORAL at 17:20

## 2023-03-31 RX ADMIN — SODIUM CHLORIDE, PRESERVATIVE FREE 10 ML: 5 INJECTION INTRAVENOUS at 11:05

## 2023-03-31 RX ADMIN — DOCUSATE SODIUM 50MG AND SENNOSIDES 8.6MG 1 TABLET: 8.6; 5 TABLET, FILM COATED ORAL at 17:20

## 2023-03-31 RX ADMIN — TRAMADOL HYDROCHLORIDE 50 MG: 50 TABLET ORAL at 14:19

## 2023-03-31 RX ADMIN — FAMOTIDINE 20 MG: 20 TABLET, FILM COATED ORAL at 08:04

## 2023-03-31 RX ADMIN — KETOROLAC TROMETHAMINE 30 MG: 30 INJECTION, SOLUTION INTRAMUSCULAR at 06:07

## 2023-03-31 RX ADMIN — SODIUM CHLORIDE, PRESERVATIVE FREE 10 ML: 5 INJECTION INTRAVENOUS at 06:15

## 2023-03-31 RX ADMIN — KETOROLAC TROMETHAMINE 30 MG: 30 INJECTION, SOLUTION INTRAMUSCULAR at 11:05

## 2023-03-31 RX ADMIN — Medication 1 AMPULE: at 08:04

## 2023-03-31 RX ADMIN — TRAMADOL HYDROCHLORIDE 50 MG: 50 TABLET ORAL at 08:04

## 2023-03-31 RX ADMIN — DOCUSATE SODIUM 50MG AND SENNOSIDES 8.6MG 1 TABLET: 8.6; 5 TABLET, FILM COATED ORAL at 08:04

## 2023-03-31 RX ADMIN — ASPIRIN 325 MG: 325 TABLET, COATED ORAL at 08:04

## 2023-03-31 RX ADMIN — Medication 1 AMPULE: at 22:59

## 2023-03-31 RX ADMIN — POLYETHYLENE GLYCOL 3350 17 G: 17 POWDER, FOR SOLUTION ORAL at 08:04

## 2023-03-31 NOTE — CONSULTS
Pt seen and examined. Full consult dictated    Intermittant sinus tachycardia present for some time.      Increased last night post op hip replacement     Echo  Cont telemetry    No arrhthymias seen of tele review

## 2023-03-31 NOTE — PROGRESS NOTES
Transition of Care Plan:    RUR: n/a outpatient   Disposition: home with home health   Follow up appointments: ortho  DME needed: owns dme needed  Transportation at Discharge: family   Paloma Ardon or means to access home:  family to provide       IM Medicare Letter: n/a  Caregiver Contact: daughter Migel Nielsen [phone]  Discharge Caregiver contacted prior to discharge? To be contacted upon pt request  Care Conference needed?:  no    Update  [facility] unable to see patient until 4/5. Multiple additional referrals sent out for [facility] in hopes to find earlier soc date. However, CM unsuccessful as no other agency is able to accept (difficult insurance). Pt to d/c home with [facility], [address] 4/5. Initial note  Pt to return home with [facility] services upon d/c. Pt voiced no preference in [facility] agency. Pt stated she has a rollator at home and already spoke with therapist about using that at home instead of RW. Referral sent to [facility] for [facility] and waiting for response.      [address], [place], [address]

## 2023-03-31 NOTE — PROGRESS NOTES
Problem: Mobility Impaired (Adult and Pediatric)  Goal: *Acute Goals and Plan of Care (Insert Text)  Description: FUNCTIONAL STATUS PRIOR TO ADMISSION: Patient was modified independent using a rollator vs bilateral SPC for functional mobility. HOME SUPPORT PRIOR TO ADMISSION: The patient lived with her niece who works during the day. She also has support from her best friend. Physical Therapy Goals  Initiated 3/30/2023    1. Patient will move from supine to sit and sit to supine , scoot up and down, and roll side to side in bed with modified independence within 4 days. 2. Patient will perform sit to stand with modified independence within 4 days. 3. Patient will ambulate with modified independence for 150 feet with the least restrictive device within 4 days. 4. Patient will ascend/descend 4 stairs with one sided handrail(s) with modified independence within 4 days. 5. Patient will verbalize and demonstrate understanding of anterior hip precautions per protocol within 4 days. 6. Patient will perform hip home exercise program per protocol with modified independence within 4 days. Outcome: Not Progressing Towards Goal   PHYSICAL THERAPY TREATMENT  Patient: Ariana Carrasco (3 y.o. female)  Date: 3/31/2023  Diagnosis: Primary localized osteoarthrosis of right hip [M16.11]  Unilateral primary osteoarthritis, right hip [M16.11] <principal problem not specified>  Procedure(s) (LRB):  RIGHT TOTAL HIP ARTHROPLASTY (Right) 1 Day Post-Op  Precautions: Total hip, WBAT (anterior hip precautions)  Chart, physical therapy assessment, plan of care and goals were reviewed. ASSESSMENT  Patient continues with skilled PT services and is not progressing towards goals. Barriers to meeting PT goals are tachycardia with minimal exertion, decreased standing balance, chronic L hip pain due to hip dysplasia, R hip weakness and pain following THR yesterday and decreased mobility skills. Lying in bed when PT arrived.  She was assessed by cardiology earlier this morning and will be having an echocardiogram and will stay on tele for now. Cleared to mobilize with PT by NP. Provided teaching and assistance as needed with THR exercises with good overall tolerance. baseline VS was 115/65 and HR of 76 in supine post exercises prior to mobilizing. Came to sitting with sba and extra time with sitting balance intact. VSS at this point. Came to standing with sba and RW for standing balance support. HR increased to 132-139 with patient reporting palpitations and feeling a little shaky. Took some steps to bedside chair with RW and sba. HR decreased fairly rapidly and patient reported feeling better. Left her up in chair with OT in the room when the session ended. Will follow up in the afternoon for 2nd session if patient is available. Vitals:    03/31/23 1204 03/31/23 1208 03/31/23 1211 03/31/23 1212   BP: 125/85 130/87 (!) 126/112 125/81   BP 1 Location: LUE Left upper arm LUE LUE   BP Patient Position: Sitting Standing Sitting post transfer to chair Sitting in chair   Pulse: 75 (!) 132 88 76   Temp:       Resp:       Height:       Weight:       SpO2: 98% RA 95% RA 98% RA 98% RA         Current Level of Function Impacting Discharge (mobility/balance): sba as noted above; has not been able to progress to gait training due to tachycardia with standing the last 2 days - being worked up by cardiology. Other factors to consider for discharge: will have assistance from her friend and niece once home; still anticipate she will dc home with [facility] PT and family assistance. PLAN :  Patient continues to benefit from skilled intervention to address the above impairments. Continue treatment per established plan of care. to address goals.     Recommendation for discharge: (in order for the patient to meet his/her long term goals)  Physical therapy at least 2 days/week in the home AND ensure assist and/or supervision for safety with mobility and ADL's.    This discharge recommendation:  Has been made in collaboration with the attending provider and/or case management    IF patient discharges home will need the following DME: patient owns DME required for discharge       SUBJECTIVE:   Patient stated  I feel a little nervous due to what happened to my heart yesterday.     OBJECTIVE DATA SUMMARY:   Critical Behavior:  Neurologic State: Alert  Orientation Level: Oriented X4  Cognition: Appropriate decision making, Appropriate for age attention/concentration, Appropriate safety awareness, Follows commands  Safety/Judgement: Awareness of environment, Good awareness of safety precautions  Functional Mobility Training:  Bed Mobility:     Supine to Sit: Stand-by assistance; Additional time     Scooting: Modified independent        Transfers:  Sit to Stand: Stand-by assistance  Stand to Sit: Stand-by assistance        Bed to Chair: Stand-by assistance; Adaptive equipment (RW)                    Balance:  Sitting: Intact; With support  Standing: Impaired  Standing - Static: Good;Constant support  Standing - Dynamic : Fair;Constant support  Ambulation/Gait Training:              Gait Description (WDL):  (took a few steps to bedside chair only due to high HR)     Right Side Weight Bearing: As tolerated  Left Side Weight Bearing: Full           Therapeutic Exercises:   SUPINE  EXERCISES   Sets   Reps   Active Active Assist   Passive Self ROM   Comments   Ankle Pumps  10 [x]                                        []                                        []                                        []                                           Quad Sets  10 [x]                                        []                                        []                                        []                                           Heel Slides  10 [x]                                        []                                        []                                        [] Hip Abduction  10 []                                        [x]                                        []                                        []                                           Glut Sets  10 [x]                                        []                                        []                                        []                                              []                                        []                                        []                                        []                                              []                                        []                                        []                                        []                                               Pain Ratin/10 at rest; 5-6/10 during heel slides and hip abd exercises. Activity Tolerance:   Fair, SpO2 stable on RA, requires rest breaks, and rapid HR with standing 132-139. After treatment patient left in no apparent distress:   Sitting in chair and Call bell within reach    COMMUNICATION/COLLABORATION:   The patients plan of care was discussed with: Occupational therapist, Registered nurse, and NP .      Madai Dennis, PT   Time Calculation: 34 mins

## 2023-03-31 NOTE — PROGRESS NOTES
[facility] March 31, 2023       RE: Marc Field      To Whom It May Concern,    This is to certify that Marc Field will be discharged from the hospital and will need the assistance of a caregiver at home to help with the recovery of her surgery. Her son, Jo Rucker, has agreed to assist her and should be excused from work to assist his mother through 4/7/23. Thank you for your assistance in this matter.       Sincerely,  Nell Aponte NP

## 2023-03-31 NOTE — PROGRESS NOTES
End of Shift Note    Bedside shift change report given to [address] [facility] (oncoming nurse) by Robby Olson RN (offgoing nurse). Report included the following information SBAR    Shift worked:nights  [id number]     Shift summary and any significant changes:     None     Concerns for physician to address:  Patient had no issues with increase of Blood pressure upon standing. Has Consult with Caardiologist on 3/31/23     Zone phone for oncoming shift:   [id number]       Activity:  Activity Level: Up with Assistance  Number times ambulated in hallways past shift: 0  Number of times OOB to chair past shift: 0    Cardiac:   Cardiac Monitoring: Yes      Cardiac Rhythm: Sinus Rhythm    Access:  Current line(s): PIV     Genitourinary:   Urinary status: voiding    Respiratory:   O2 Device: None (Room air)  Chronic home O2 use?: NO  Incentive spirometer at bedside: YES       GI:  Last Bowel Movement Date: 03/30/23  Current diet:  ADULT DIET Regular; 3 carb choices (45 gm/meal)  DIET ONE TIME MESSAGE  Passing flatus: YES  Tolerating current diet: YES       Pain Management:   Patient states pain is manageable on current regimen: YES    Skin:  Mikey Score: 19  Interventions: float heels    Patient Safety:  Fall Score:  Total Score: 3  Interventions: bed/chair alarm, assistive device (walker, cane, etc), gripper socks, and pt to call before getting OOB  High Fall Risk: Yes    Length of Stay:  Expected LOS: - - -  Actual LOS: 0      Robby Olson RN

## 2023-03-31 NOTE — PROGRESS NOTES
Occupational Therapy  Medical record reviewed. Pt calling out for assistance with her beeping TEODORA drain. She reports that she is awaiting cardiology consult as her HR will increase when she mobilizes. Nurse informed and will defer at this time as Cardiologist is now with patient. Will continue to follow as appropriate.

## 2023-03-31 NOTE — PROGRESS NOTES
Problem: Falls - Risk of  Goal: *Absence of Falls  Description: Document Juan Ni Fall Risk and appropriate interventions in the flowsheet. Outcome: Progressing Towards Goal  Note: Fall Risk Interventions:  Mobility Interventions: Assess mobility with egress test, Communicate number of staff needed for ambulation/transfer, OT consult for ADLs, Strengthening exercises (ROM-active/passive), Patient to call before getting OOB, PT Consult for mobility concerns, PT Consult for assist device competence, Utilize walker, cane, or other assistive device, Utilize gait belt for transfers/ambulation         Medication Interventions: Assess postural VS orthostatic hypotension, Bed/chair exit alarm, Evaluate medications/consider consulting pharmacy, Utilize gait belt for transfers/ambulation, Teach patient to arise slowly, Patient to call before getting OOB    Elimination Interventions: Bed/chair exit alarm, Call light in reach, Elevated toilet seat, Patient to call for help with toileting needs, Toileting schedule/hourly rounds, Toilet paper/wipes in reach, Stay With Me (per policy)              Problem: Patient Education: Go to Patient Education Activity  Goal: Patient/Family Education  Outcome: Progressing Towards Goal     Problem: Pressure Injury - Risk of  Goal: *Prevention of pressure injury  Description: Document Mikey Scale and appropriate interventions in the flowsheet. Outcome: Progressing Towards Goal  Note: Pressure Injury Interventions:  Sensory Interventions: Assess changes in LOC, Check visual cues for pain, Discuss PT/OT consult with provider, Float heels, Minimize linen layers, Turn and reposition approx. every two hours (pillows and wedges if needed)         Activity Interventions: PT/OT evaluation, Increase time out of bed    Mobility Interventions: PT/OT evaluation, Turn and reposition approx.  every two hours(pillow and wedges), Float heels, Assess need for specialty bed    Nutrition Interventions: Document food/fluid/supplement intake    Friction and Shear Interventions: Minimize layers, Feet elevated on foot rest, Transferring/repositioning devices                Problem: Patient Education: Go to Patient Education Activity  Goal: Patient/Family Education  Outcome: Progressing Towards Goal     Problem: Patient Education: Go to Patient Education Activity  Goal: Patient/Family Education  Outcome: Progressing Towards Goal

## 2023-03-31 NOTE — PROGRESS NOTES
Problem: Mobility Impaired (Adult and Pediatric)  Goal: *Acute Goals and Plan of Care (Insert Text)  Description: FUNCTIONAL STATUS PRIOR TO ADMISSION: Patient was modified independent using a rollator vs bilateral SPC for functional mobility. HOME SUPPORT PRIOR TO ADMISSION: The patient lived with her niece who works during the day. She also has support from her best friend. Physical Therapy Goals  Initiated 3/30/2023    1. Patient will move from supine to sit and sit to supine , scoot up and down, and roll side to side in bed with modified independence within 4 days. 2. Patient will perform sit to stand with modified independence within 4 days. 3. Patient will ambulate with modified independence for 150 feet with the least restrictive device within 4 days. 4. Patient will ascend/descend 4 stairs with one sided handrail(s) with modified independence within 4 days. 5. Patient will verbalize and demonstrate understanding of anterior hip precautions per protocol within 4 days. 6. Patient will perform hip home exercise program per protocol with modified independence within 4 days. 3/31/2023 1613 by Vi Salamanca, PT  Outcome: Progressing Towards Goal  3/31/2023 1249 by Vi Salamanca, PT  Outcome: Not Progressing Towards Goal   PHYSICAL THERAPY TREATMENT  Patient: Jyoti Vidal (85 y.o. female)  Date: 3/31/2023  Diagnosis: Primary localized osteoarthrosis of right hip [M16.11]  Unilateral primary osteoarthritis, right hip [M16.11] <principal problem not specified>  Procedure(s) (LRB):  RIGHT TOTAL HIP ARTHROPLASTY (Right) 1 Day Post-Op  Precautions: Total hip, WBAT (anterior hip precautions)  Chart, physical therapy assessment, plan of care and goals were reviewed. ASSESSMENT  Patient continues with skilled PT services and is progressing towards goals. Lying in bed when PT arrived. Constant monitoring using portable tele box patient was using. At rest HR was 82 lying in bed. Provided THR exercises with good tolerance with sba only needed. Came to sitting with sba with intact sitting balance. HR increased to 103-107 sitting. Stood with RW and HR increased to 117-123. Gait training with RW and sba for about 120 feet with 3-4 standing rests. HR peaked at 149-153 once back to edge of bed to sit. It rapidly decreased once sitting to low 100's. She reported not feeling as shaky as she had in the morning session. Assisted patient to bedside chair on the other side of the bed using RW and sba. She was resting comfortably in chair with HR at 92 when the session ended. Her son and niece were present in the room during the session as well. She has orders for an echocardiogram which should happen some time today. She still needs to clear stairs and car transfer training to clear PT. If HR still an issue with standing and walking, may need to transport to the gym by wheelchair for training, if she will be discharged tomorrow. Current Level of Function Impacting Discharge (mobility/balance): sba as noted above    Other factors to consider for discharge: being worked up by cardiology for tachycardia; needs to clear stairs and car transfers; her son will be taking time off to assist his mother following discharge home. PLAN :  Patient continues to benefit from skilled intervention to address the above impairments. Continue treatment per established plan of care. to address goals. Recommendation for discharge: (in order for the patient to meet his/her long term goals)  Physical therapy at least 2 days/week in the home AND ensure assist and/or supervision for safety with mobility. This discharge recommendation:  Has been made in collaboration with the attending provider and/or case management    IF patient discharges home will need the following DME: patient owns DME required for discharge       SUBJECTIVE:   Patient stated Jarod Prim been doing my exercises.      OBJECTIVE DATA SUMMARY:   Critical Behavior:  Neurologic State: Alert  Orientation Level: Oriented X4  Cognition: Appropriate decision making, Appropriate for age attention/concentration, Appropriate safety awareness, Follows commands  Safety/Judgement: Awareness of environment, Good awareness of safety precautions  Functional Mobility Training:  Bed Mobility:     Supine to Sit: Stand-by assistance     Scooting: Modified independent        Transfers:  Sit to Stand: Modified independent  Stand to Sit: Modified independent        Bed to Chair: Supervision; Adaptive equipment (RW)                    Balance:  Sitting: Intact  Standing: Impaired  Standing - Static: Good;Constant support  Standing - Dynamic : Fair;Constant support  Ambulation/Gait Training:  Distance (ft): 120 Feet (ft)  Assistive Device: Gait belt;Walker, rolling  Ambulation - Level of Assistance: Stand-by assistance     Gait Description (WDL): Exceptions to WDL  Gait Abnormalities: Antalgic;Decreased step clearance (L hip now hurts more than new R THR)  Right Side Weight Bearing: As tolerated  Left Side Weight Bearing: Full  Base of Support: Widened  Stance: Left decreased  Speed/Amita: Pace decreased (<100 feet/min)     Swing Pattern: Left asymmetrical     Interventions: Verbal cues; Visual/Demos; Safety awareness training; Other (comment) (self pacing)       Therapeutic Exercises:   SUPINE  EXERCISES   Sets   Reps   Active Active Assist   Passive Self ROM   Comments   Ankle Pumps  10 [x]                                        []                                        []                                        []                                           Quad Sets  10 [x]                                        []                                        []                                        []                                           Heel Slides  10 [x]                                        []                                        []                                        [] Hip Abduction   []                                        []                                        []                                        []                                           Glut Sets  10 [x]                                        []                                        []                                        []                                              []                                        []                                        []                                        []                                              []                                        []                                        []                                        []                                               Pain Ratin-5 R hip during heel slides; 6/10 R hip with WB during gait training and 2-3/10 R hip with WB during gait training.     Activity Tolerance:   Fair, SpO2 stable on RA, and requires frequent rest breaks      After treatment patient left in no apparent distress:   Sitting in chair, Call bell within reach, and Caregiver / family present    COMMUNICATION/COLLABORATION:   The patients plan of care was discussed with:  NICO Rain, PT   Time Calculation: 25 mins

## 2023-03-31 NOTE — PROGRESS NOTES
End of Shift Note    Bedside shift change report given to Susie BOSTON (oncoming nurse) by Sharon Kelly RN (offgoing nurse). Report included the following information SBAR, Kardex, Intake/Output, MAR, Recent Results, and Cardiac Rhythm nsr/tach    Shift worked:  day     Shift summary and any significant changes:     VS stable, pt up with 1 assist and walker to bathroom, voiding clear yellow urine without reports of difficulties, prn tramadol given for complaints of pain. Dr. Mahogany Deras notified for cardiology consult for pulse elevation when ambulating, echo ordered. Concerns for physician to address:       Zone phone for oncoming shift:   [phone]       Activity:  Activity Level: Up with Assistance  Number times ambulated in hallways past shift: 0  Number of times OOB to chair past shift: 2    Cardiac:   Cardiac Monitoring: Yes      Cardiac Rhythm: Sinus Rhythm    Access:  Current line(s): PIV     Genitourinary:   Urinary status: voiding    Respiratory:   O2 Device: None (Room air)  Chronic home O2 use?: NO  Incentive spirometer at bedside: YES  Actual Volume (ml): 2500 ml    GI:  Last Bowel Movement Date: 03/30/23  Current diet:  ADULT DIET Regular; 3 carb choices (45 gm/meal)  DIET ONE TIME MESSAGE  Passing flatus: YES  Tolerating current diet: YES       Pain Management:   Patient states pain is manageable on current regimen: YES    Skin:  Mikey Score: 18  Interventions: float heels, increase time out of bed, PT/OT consult, and limit briefs    Patient Safety:  Fall Score:  Total Score: 3  Interventions: bed/chair alarm, assistive device (walker, cane, etc), gripper socks, pt to call before getting OOB, stay with me (per policy), and gait belt  High Fall Risk: Yes    Length of Stay:  Expected LOS: - - -  Actual LOS: 0      Cary Garcia RN

## 2023-03-31 NOTE — PROGRESS NOTES
FUNCTIONAL STATUS PRIOR TO ADMISSION: pt lives with her niece (who works) and has her best friend for support. Pt reported that she was independent in adls, but could not stand for long, nor mobilize very far using her rollator. She has a tub shower and stated that she crawls over the tub PTA to shower. She has purchased a tub seat, but finds that it does not fit well. HOME SUPPORT: The patient lived with niece and has best friends assistance. Occupational Therapy Goals  Initiated 3/31/2023  1. Patient will perform ambulation into the bathroom using RW and perform grooming in standing with supervision/set-up within 7 day(s). 2.  Patient will perform bathing with modified independence within 7 day(s). 3.  Patient will perform lower body dressing with modified independence within 7 day(s). 4.  Patient will walk into the bathroom to perform toilet transfers with modified independence within 7 day(s). 5.  Patient will perform all aspects of toileting with modified independence (pt issued toilet tongs this date) within 7 day(s). 6.  Patient will participate in upper extremity therapeutic exercise/activities with supervision/set-up for 5 minutes within 7 day(s). 7.  Patient will utilize energy conservation techniques during functional activities with minimal verbal cues within 7 day(s). OCCUPATIONAL THERAPY EVALUATION  Patient: Cecily Flores (20 y.o. female)  Date: 3/31/2023  Primary Diagnosis: Primary localized osteoarthrosis of right hip [M16.11]  Unilateral primary osteoarthritis, right hip [M16.11]  Procedure(s) (LRB):  RIGHT TOTAL HIP ARTHROPLASTY (Right) 1 Day Post-Op   Precautions: Total hip, WBAT (anterior hip precautions)    ASSESSMENT  Based on the objective data described below, the patient presents with mildly decreased balance, generalized weakness, and significantly increased HR during mobility efforts (standing and transfer, HR >130) on POD 1 of R PORTER .     Once seated and at rest, pt's HR decreases/normalizes into 70s/ 80s. Pt does report symptoms of feeling like her heart is racing. This is limiting safe adl mobility at this time and recommend that pt use BSC for toileting. Pt was educated in using the kit of AE during lower body adls for comfort and safety, and was also issued a toilet tongs. In general, pt needs min A for adls and is S for functional transfers using the RW. Pt is functioning below her baseline for adls and mobility and will benefit from acute OT services. At discharge recommend [facility] OT and the assistance of her family/friend for IADLs/safety    Current Level of Function Impacting Discharge (ADLs/self-care): up to min A for adls, S for adl transfers. Pt will need to problem solve with home therapist safe tub transfers--suggested tub rail or tub bench (question size/fit)    Functional Outcome Measure: The patient scored 55/100 on the BArthel Index outcome measure which is indicative of partially dependent. Other factors to consider for discharge: Pt reports that her L hip is in need of surgery as well and she is hoping to have surgery in a few months time. Pt reports that she has been having a racing heart rate during mobility for the past year or so. Patient will benefit from skilled therapy intervention to address the above noted impairments. PLAN :  Recommendations and Planned Interventions: self care training, functional mobility training, therapeutic exercise, balance training, therapeutic activities, endurance activities, patient education, and home safety training    Frequency/Duration: Patient will be followed by occupational therapy 5 times a week to address goals.     Recommendation for discharge: (in order for the patient to meet his/her long term goals)  Occupational therapy at least 2 days/week in the home AND ensure assist and/or supervision for safety with adls, IADLS and mobility    This discharge recommendation:  Has not yet been discussed the attending provider and/or case management    IF patient discharges home will need the following DME: transfer bench and possibly a tub grab bar. SUBJECTIVE:   Patient reported that she has a rollator that she can sit on if she needs armrests for support during adls. OBJECTIVE DATA SUMMARY:   HISTORY:   Past Medical History:   Diagnosis Date    Arthritis     hips    Spinal stenosis      Past Surgical History:   Procedure Laterality Date    HX  SECTION      x2 -,     HX HEENT      jaw surgery    HX HERNIA REPAIR  2021    Dr. St[facility]      [facility]       Expanded or extensive additional review of patient history: pt had cardiology consult this date and ECHO was ordered. Nurse cleared pt for therapy    Home Situation  Home Environment: Private residence  # Steps to Enter: 4  Rails to Enter: Yes  Hand Rails : Bilateral (too wide)  One/Two Story Residence: One story  Living Alone: No  Support Systems: Other Family Member(s), Friend/Neighbor (niece and best friend)  Patient Expects to be Discharged to[de-identified] Home with home health  Current DME Used/Available at Home: To Calderon, rahcelle, Michele Iyer, rollator, Shower chair, Raised toilet seat (shower chair too large for shower, reacher)  Tub or Shower Type: Tub/Shower combination    Hand dominance: Right    EXAMINATION OF PERFORMANCE DEFICITS:  Cognitive/Behavioral Status:  Neurologic State: Alert  Orientation Level: Oriented X4  Cognition: Appropriate decision making; Appropriate for age attention/concentration; Appropriate safety awareness; Follows commands  Perception: Appears intact  Perseveration: No perseveration noted  Safety/Judgement: Awareness of environment;Good awareness of safety precautions    Skin: incision appears intact, dry    Edema: expected post surgery in hip area.   Has Ice pack    Hearing:       Vision/Perceptual:                           Acuity: Within Defined Limits         Range of Motion:  BUEs:    AROM: Generally decreased, functional  PROM: Generally decreased, functional (R hip anterior precautions)                      Strength:  BUEs:  Strength: Generally decreased, functional            Coordination:  Coordination: Within functional limits  Fine Motor Skills-Upper: Left Intact; Right Intact    Gross Motor Skills-Upper: Left Intact; Right Intact    Tone & Sensation:    Tone: Normal  Sensation: Intact                      Balance:  Sitting: Intact; With support  Standing: Impaired  Standing - Static: Good;Constant support  Standing - Dynamic : Fair;Constant support    Functional Mobility and Transfers for ADLs:  Bed Mobility:  Supine to Sit: Stand-by assistance; Additional time  Scooting: Modified independent    Transfers:  Sit to Stand: Stand-by assistance  Stand to Sit: Stand-by assistance  Bed to Chair: Stand-by assistance; Adaptive equipment (RW)  Bathroom Mobility:  (unable due to high HR)  Toilet Transfer :  (deferred. will need BSC)  Tub Transfer:  (problem solved pts tub shower situation at home-may benefit from tub rail//tub bench, depending on bathroom set up/ size of room and DME)  Assistive Device : Walker (for transfer)    ADL Assessment:  Feeding: Independent    Oral Facial Hygiene/Grooming: Setup;Stand-by assistance (would need to sit at this time due to medical conditioon/HR)    Bathing: Minimum assistance    Type of Bath:  (will need sponge bathing set up at this time)    Upper Body Dressing: Setup    Lower Body Dressing: Minimum assistance (using AE--issued kit of AE this date, as well as toilet tongs)    Toileting: Moderate assistance (provided toilet tongs as pt r eports difficulty at baseline and has concern for twisting onto surgical hip in her efforts to perform hygiene) Pt reports that she has difficulty at baseline.     Meal Preparation:  (pt reports that she will have assistance at home for IADLs)             ADL Intervention and task modifications:                   Type of Bath:  (will need sponge bathing set up at this time)                        Cognitive Retraining  Safety/Judgement: Awareness of environment;Good awareness of safety precautions    Precautions: Patient recalled and demonstrated avoiding extreme planes of movement with Right LE during ADLs and functional mobility with minimal cues. Dressing joint: Patient instructed and demonstrated understanding to don/doff Right LE first/last with minimal cues. Patient instructed and demonstrated to don all clothing while sitting prior to standing, doff all clothing to knees while standing, then sit to doff clothing off from knees to feet to facilitate fall prevention, pain management, and energy conservation with Minimum assistance. Home safety: Patient instructed on home modifications and safety (raise height of ADL objects, appropriate height of chair surfaces, recliner safety, change of floor surfaces, clear pathways) to increase independence and fall prevention. Patient indicated understanding. Standing: Pt reports that her LEs feel unequal.. Instructed to apply concept of hip contraindications to ADLs within the home (no extreme reaching across body to Right side, square off while using objects, slide objects along surfaces). Patient instructed to increase amount of time standing, observe standing position during ADLs to increase even weight bearing through bilateral LEs to increase independence with ADLs. Tub transfer: Patient instructed regarding when it is safe to begin transfer into tub (complete stairs with PT, advance exercises with PT high enough to clear tub height). Patient instructed to use the same technique as used with stairs when entering and exiting tub (\"up with the non-surgical, down with the surgical leg\"). Patient indicated understanding. Pt has multiple concerns re: tub transfer, equipment needed.   Educated and will likely best to consult home therapist.      Functional Measure:    Barthel Index:  Bathin  Bladder: 10  Bowels: 10  Groomin  Dressin  Feeding: 10  Mobility: 0  Stairs: 0  Toilet Use: 5  Transfer (Bed to Chair and Back): 10  Total: 55/100      The Barthel ADL Index: Guidelines  1. The index should be used as a record of what a patient does, not as a record of what a patient could do. 2. The main aim is to establish degree of independence from any help, physical or verbal, however minor and for whatever reason. 3. The need for supervision renders the patient not independent. 4. A patient's performance should be established using the best available evidence. Asking the patient, friends/relatives and nurses are the usual sources, but direct observation and common sense are also important. However direct testing is not needed. 5. Usually the patient's performance over the preceding 24-48 hours is important, but occasionally longer periods will be relevant. 6. Middle categories imply that the patient supplies over 50 per cent of the effort. 7. Use of aids to be independent is allowed. Score Interpretation (from [address] [address])    Independent   60-79 Minimally independent   40-59 Partially dependent   20-39 Very dependent   <20 Totally dependent     -Sami Tillman., Barthel, D.W. (1965). Functional evaluation: the Barthel Index. [facility] ([address] (1997). The Barthel activities of daily living index: self-reporting versus actual performance in the old (> or = 75 years). Journal of [address] [place] [address] 45(7), [facility], J.J.M.F, Bindu Bryan., Brooke Grissom. (1999). Measuring the change in disability after inpatient rehabilitation; comparison of the responsiveness of the Barthel Index and Functional Wolbach Measure. Journal of Neurology, Neurosurgery, and Psychiatry, 66(4), 179-978.   -Kevin Escobedo, N.J.A, JEFE Conklin, Mik Brunson, M.A. (2004) Assessment of post-stroke quality of life in cost-effectiveness studies: The usefulness of the Barthel Index and the EuroQoL-5D. Quality of Life Research, 15, 612-16        Occupational Therapy Evaluation Charge Determination   History Examination Decision-Making   MEDIUM Complexity : Expanded review of history including physical, cognitive and psychosocial  history  MEDIUM Complexity : 3-5 performance deficits relating to physical, cognitive , or psychosocial skils that result in activity limitations and / or participation restrictions MEDIUM Complexity : Patient may present with comorbidities that affect occupational performnce. Miniml to moderate modification of tasks or assistance (eg, physical or verbal ) with assesment(s) is necessary to enable patient to complete evaluation       Based on the above components, the patient evaluation is determined to be of the following complexity level: MEDIUM  Pain Rating:  Pt reports 2/10 pain    Activity Tolerance:   Poor  Patient Vitals for the past 4 hrs:   Temp Pulse Resp BP SpO2   03/31/23 1212 -- 76 -- 125/81 98 %   03/31/23 1211 -- 88 -- (!) 126/112 98 %   03/31/23 1208 -- (!) 132 -- 130/87 95 %   03/31/23 1204 -- 75 -- 125/85 98 %   03/31/23 1159 -- 76 -- 115/65 99 %   After treatment patient left in no apparent distress:    Sitting in chair, Call bell within reach, and nurse informed. COMMUNICATION/EDUCATION:   The patients plan of care was discussed with: Physical therapist and Registered nurse. Patient/family have participated as able in goal setting and plan of care. This patients plan of care is appropriate for delegation to [facility].     Thank you for this referral.  Christina Powell, OTR/L  Time Calculation: 44 mins

## 2023-03-31 NOTE — CONSULTS
[facility]    Name:  Evelyne Gilbert  MR#:  [id number]  :  1970  ACCOUNT #:  [de-identified]  DATE OF SERVICE:  2023    REQUESTING PHYSICIAN:  Heidi Nam DO    CHIEF COMPLAINT:  \"My heart is fast.\"    REASON FOR CONSULTATION:  Evaluate tachycardia. HISTORY OF PRESENT ILLNESS:  The patient is a 51-year-old female without prior cardiac history. She does have a history of insulin resistance and is on metformin. She underwent right total hip replacement yesterday. She has been having episodes of rapid heartbeat for some time. Typically, her heart rate will get over 100 at home when she walks. She has had a considerable amount of pain from her hip dysplasia. She denies dizziness or syncope. No exertional chest pain or shortness of breath. She has not had any prior cardiac testing. Last night postoperatively, the patient was in sinus tachycardia with a heart rate briefly up to 140 beats per minute. She apparently got out of bed around this time. She is feeling okay at this time. Review of telemetry strips did not reveal any significant arrhythmias, but did show intermittent periods of sinus tachycardia. PAST MEDICAL HISTORY:  As noted above, insulin resistance spinal stenosis and hip dysplasia. CURRENT MEDICATIONS:  1.  IV normal saline. 2.  Aspirin. 3.  Metformin. 4.  Magnesium oxide. 5.  Toradol. 6.  Pepcid. 7.  Vitamin B12.  8.  Tylenol. 9.  MiraLax. 10.  Colace. SURGERIES:  Prior , prior hernia repair, prior BTL, prior jaw surgery. SOCIAL HISTORY:  The patient does not smoke. She drinks alcohol socially. FAMILY HISTORY:  Positive heart disease in the patient's mother who had stents and in her maternal uncle who also had significant heart issues. REVIEW OF SYSTEMS:  As noted above, otherwise noncontributory. A 12-point review of systems done on admission was reviewed. No other pertinent positives noted.     PHYSICAL EXAMINATION:  GENERAL:  Exam reveals an obese middle-aged white female in no acute distress. VITAL SIGNS:  Blood pressure 113/77, pulse 81, respirations 16, temperature 98. 2. HEENT:  Pupils are equal and reactive to light. Oropharynx unremarkable. Moist oral mucosa. NECK:  Supple. No masses or thyromegaly. No cervical or supraclavicular adenopathy. No carotid bruits. No JVD. CHEST:  Clear to auscultation. CARDIAC:  Regular rate and rhythm. No obvious murmurs, rubs, gallops or clicks. ABDOMEN:  Obese, soft, nontender. No masses or organomegaly. Bowel sounds positive. EXTREMITIES:  1+ edema. No clubbing or cyanosis. Distal pulses 1+. NEURO:  No obvious gross motor deficits. SKIN:  Warm and dry. No rashes. LABORATORY DATA:  Hemoglobin 12.8. BUN 16, creatinine 0.7. EKG, normal sinus rhythm, normal axis, no Q-waves, no ST or T changes. IMPRESSION:  1. Intermittent sinus tachycardia, probably due to multiple underlying causes, including chronic pain. 2.  Status post right total hip replacement yesterday. 3.  Obesity. 4.  Insulin resistance. 5.  History of bilateral hip dysplasia. RECOMMENDATIONS:  Review of telemetry shows only sinus tachycardia with no obvious arrhythmias. We will continue telemetry. I will order an echocardiogram to ensure there is no underlying structural heart disease. I suspect the intermittent sinus tachycardia is due to multiple causes including volume depletion after surgery as well as chronic pain. No additional recommendations at this time. Thank you for this consult.         Sonya Woo MD      [id number]  D:  03/31/2023 11:11  T:  03/31/2023 12:44  JOB #:  [id number]  CC:  MD Enrrique Mena DO

## 2023-03-31 NOTE — PROGRESS NOTES
Ortho / Neurosurgery NP Note    POD# 1  s/p RIGHT TOTAL HIP ARTHROPLASTY   Pt seen resting in bed. Pt no acute distress. Denies CP, palpitations, SOB, dizziness. She reports no symptoms when tachycardic yesterday  HR elevated while working with PT in room on 3/30, HR 160s, pt reported palpitations and her \"heart is racing\". BP stable. Reported similar episodes at home which have been occurring for about a year, has not seek treatment for, thought it was related to decreased endurance from osvaldo hip OA. Reports minimal postop pain, aware of prn oxycodone (tolerated in past)  Currently feels her postop pain is significantly better than preop pain      Tolerating regular diet. No nausea     VSS Afebrile. Room air. NSR rhythm on tele overnight. Visit Vitals  BP 99/69   Pulse 76   Temp 97.9 °F (36.6 °C)   Resp 18   Ht 5' 3\" (1.6 m)   Wt 113 kg (249 lb 1.9 oz)   LMP 02/07/2011   SpO2 100%   BMI 44.13 kg/m²       Voiding status: voiding   Output (mL)  Urine Voided: 350 ml (03/30/23 1942)  Last Bowel Movement Date: 03/30/23 (03/31/23 0752)      Labs    Lab Results   Component Value Date/Time    HGB 12.8 03/31/2023 03:59 AM      Lab Results   Component Value Date/Time    INR 1.0 03/21/2023 10:00 AM      Lab Results   Component Value Date/Time    Sodium 140 03/31/2023 03:59 AM    Potassium 4.0 03/31/2023 03:59 AM    Chloride 112 (H) 03/31/2023 03:59 AM    CO2 24 03/31/2023 03:59 AM    Glucose 132 (H) 03/31/2023 03:59 AM    BUN 16 03/31/2023 03:59 AM    Creatinine 0.72 03/31/2023 03:59 AM    Calcium 9.0 03/31/2023 03:59 AM     Recent Glucose Results:   Lab Results   Component Value Date/Time     (H) 03/31/2023 03:59 AM       Body mass index is 44.13 kg/m². : A BMI > 30 is classified as obesity and > 40 is classified as morbid obesity. TEODORA dressing c.d.i - pt education provided   Cryotherapy in place over incision  Calves soft and supple; No pain with passive stretch  Sensation and motor intact. +PF/DF/EHL intact   SCDs for mechanical DVT proph while in bed     PLAN:  1) PT BID, OT - WBAT   2) Aspirin 325 mg PO BID for DVT Prophylaxis   3) GI Prophylaxis - Pepcid  4) Pain control - scheduled tylenol  and toradol, and prn  tramadol and oxycodone    5) Tachycardia - Appreciate Dr. Windy Foster assistance. Echo ordered. Continue Tele  6) Readiness for discharge:     [x] Vital Signs stable    [x] Hgb stable    [x] + Voiding    [x] Wound intact, drainage minimal    [x] Tolerating PO intake     [x] Cleared by PT (OT if applicable)     [x] Stair training completed (if applicable)    [x] Independent / Contact Guard Assist (household distance)     [x] Bed mobility     [x] Car transfers     [x] ADLs    [x] Adequate pain control on oral medication alone     Anticipate discharge home when clears from cardiac standpoint with [place] and friends' support.      Floyd Carrasco, NICO

## 2023-04-01 VITALS
OXYGEN SATURATION: 99 % | DIASTOLIC BLOOD PRESSURE: 55 MMHG | SYSTOLIC BLOOD PRESSURE: 101 MMHG | BODY MASS INDEX: 44.14 KG/M2 | RESPIRATION RATE: 16 BRPM | TEMPERATURE: 97.9 F | HEIGHT: 63 IN | WEIGHT: 249.12 LBS | HEART RATE: 79 BPM

## 2023-04-01 PROBLEM — M16.11 ARTHRITIS OF RIGHT HIP: Status: ACTIVE | Noted: 2023-04-01

## 2023-04-01 PROBLEM — R00.0 SINUS TACHYCARDIA: Status: ACTIVE | Noted: 2023-04-01

## 2023-04-01 PROBLEM — M16.11 ARTHRITIS OF RIGHT HIP: Status: RESOLVED | Noted: 2023-04-01 | Resolved: 2023-04-01

## 2023-04-01 PROCEDURE — 74011000250 HC RX REV CODE- 250: Performed by: ORTHOPAEDIC SURGERY

## 2023-04-01 PROCEDURE — 97116 GAIT TRAINING THERAPY: CPT

## 2023-04-01 PROCEDURE — 74011250637 HC RX REV CODE- 250/637: Performed by: ORTHOPAEDIC SURGERY

## 2023-04-01 PROCEDURE — G0378 HOSPITAL OBSERVATION PER HR: HCPCS

## 2023-04-01 PROCEDURE — 97530 THERAPEUTIC ACTIVITIES: CPT

## 2023-04-01 RX ADMIN — ACETAMINOPHEN 1000 MG: 500 TABLET ORAL at 16:25

## 2023-04-01 RX ADMIN — MAGNESIUM OXIDE TAB 400 MG (241.3 MG ELEMENTAL MG) 400 MG: 400 (241.3 MG) TAB at 08:46

## 2023-04-01 RX ADMIN — TRAMADOL HYDROCHLORIDE 50 MG: 50 TABLET ORAL at 08:46

## 2023-04-01 RX ADMIN — DOCUSATE SODIUM 50MG AND SENNOSIDES 8.6MG 1 TABLET: 8.6; 5 TABLET, FILM COATED ORAL at 16:24

## 2023-04-01 RX ADMIN — ACETAMINOPHEN 1000 MG: 500 TABLET ORAL at 11:53

## 2023-04-01 RX ADMIN — FAMOTIDINE 20 MG: 20 TABLET, FILM COATED ORAL at 08:46

## 2023-04-01 RX ADMIN — METFORMIN HYDROCHLORIDE 500 MG: 500 TABLET, EXTENDED RELEASE ORAL at 16:24

## 2023-04-01 RX ADMIN — OXYCODONE HYDROCHLORIDE 5 MG: 5 TABLET ORAL at 15:03

## 2023-04-01 RX ADMIN — Medication 1000 MCG: at 08:46

## 2023-04-01 RX ADMIN — POLYETHYLENE GLYCOL 3350 17 G: 17 POWDER, FOR SOLUTION ORAL at 08:46

## 2023-04-01 RX ADMIN — Medication 1 AMPULE: at 08:47

## 2023-04-01 RX ADMIN — FAMOTIDINE 20 MG: 20 TABLET, FILM COATED ORAL at 16:25

## 2023-04-01 RX ADMIN — ASPIRIN 325 MG: 325 TABLET, COATED ORAL at 16:25

## 2023-04-01 RX ADMIN — ASPIRIN 325 MG: 325 TABLET, COATED ORAL at 08:46

## 2023-04-01 RX ADMIN — SODIUM CHLORIDE, PRESERVATIVE FREE 10 ML: 5 INJECTION INTRAVENOUS at 11:53

## 2023-04-01 RX ADMIN — DOCUSATE SODIUM 50MG AND SENNOSIDES 8.6MG 1 TABLET: 8.6; 5 TABLET, FILM COATED ORAL at 08:46

## 2023-04-01 NOTE — PROGRESS NOTES
POD# 2 s/p RIGHT TOTAL HIP ARTHROPLASTY   Pt seen in beside chair    Pt no acute distress. Denies CP, palpitations, SOB, dizziness. Patient still pending cardiac echo. Notes pain controlled. Unchanged since yesterday     Tolerating regular diet. No nausea     VSS Afebrile. Room air. NSR rhythm on tele overnight. Visit Vitals  /79   Pulse 98   Temp 98.4 °F (36.9 °C)   Resp 16   Ht 5' 3\" (1.6 m)   Wt 249 lb 1.9 oz (113 kg)   LMP 02/07/2011   SpO2 97%   BMI 44.13 kg/m²       Voiding status: voiding   Output (mL)  Urine Voided: 350 ml (03/30/23 1942)  Last Bowel Movement Date: 03/30/23 (04/01/23 0719)  Unmeasurable Output  Urine Occurrence(s): 1 (03/31/23 1430)      Labs    Lab Results   Component Value Date/Time    HGB 12.8 03/31/2023 03:59 AM      Lab Results   Component Value Date/Time    INR 1.0 03/21/2023 10:00 AM      Lab Results   Component Value Date/Time    Sodium 140 03/31/2023 03:59 AM    Potassium 4.0 03/31/2023 03:59 AM    Chloride 112 (H) 03/31/2023 03:59 AM    CO2 24 03/31/2023 03:59 AM    Glucose 132 (H) 03/31/2023 03:59 AM    BUN 16 03/31/2023 03:59 AM    Creatinine 0.72 03/31/2023 03:59 AM    Calcium 9.0 03/31/2023 03:59 AM     Recent Glucose Results:   No results found for: GLU, GLUPOC, GLUCPOC      Body mass index is 44.13 kg/m². : A BMI > 30 is classified as obesity and > 40 is classified as morbid obesity. TEODORA dressing c.d.i - pt education provided   Cryotherapy in place over incision  Calves soft and supple; No pain with passive stretch  Sensation and motor intact. +PF/DF/EHL intact   SCDs for mechanical DVT proph while in bed     PLAN:  1) PT BID, OT - WBAT   2) Aspirin 325 mg PO BID for DVT Prophylaxis   3) GI Prophylaxis - Pepcid  4) Pain control - scheduled tylenol  , and prn  tramadol and oxycodone    5) Tachycardia - Appreciate Dr. Michaela Arroyo assistance. Echo ordered.  Continue Tele  6) Readiness for discharge:     [x] Vital Signs stable    [x] Hgb stable    [x] + Voiding    [x] Wound intact, drainage minimal    [x] Tolerating PO intake     [x] Cleared by PT (OT if applicable)     [x] Stair training completed (if applicable)    [x] Independent / Contact Guard Assist (household distance)     [x] Bed mobility     [x] Car transfers     [x] ADLs    [x] Adequate pain control on oral medication alone     Pending cardiac clearance for discharge. Echocardiogram pending.     Dione Sen MD

## 2023-04-01 NOTE — PROGRESS NOTES
Problem: Mobility Impaired (Adult and Pediatric)  Goal: *Acute Goals and Plan of Care (Insert Text)  Description: FUNCTIONAL STATUS PRIOR TO ADMISSION: Patient was modified independent using a rollator vs bilateral SPC for functional mobility. HOME SUPPORT PRIOR TO ADMISSION: The patient lived with her niece who works during the day. She also has support from her best friend. Physical Therapy Goals  Initiated 3/30/2023    1. Patient will move from supine to sit and sit to supine , scoot up and down, and roll side to side in bed with modified independence within 4 days. 2. Patient will perform sit to stand with modified independence within 4 days. 3. Patient will ambulate with modified independence for 150 feet with the least restrictive device within 4 days. 4. Patient will ascend/descend 4 stairs with one sided handrail(s) with modified independence within 4 days. 5. Patient will verbalize and demonstrate understanding of anterior hip precautions per protocol within 4 days. 6. Patient will perform hip home exercise program per protocol with modified independence within 4 days. Outcome: Progressing Towards Goal     PHYSICAL THERAPY TREATMENT  Patient: Kenny Soriano (89 y.o. female)  Date: 4/1/2023  Diagnosis: Primary localized osteoarthrosis of right hip [M16.11]  Unilateral primary osteoarthritis, right hip [M16.11] <principal problem not specified>  Procedure(s) (LRB):  RIGHT TOTAL HIP ARTHROPLASTY (Right) 2 Days Post-Op  Precautions: Total hip, WBAT (anterior hip precautions)  Chart, physical therapy assessment, plan of care and goals were reviewed. ASSESSMENT  Patient continues with skilled PT services and is progressing towards goals. Patient received lying in bed and agreeable to activity. Patient's HR was 115bpm in supine and then transitioned to sitting with increased time and HR raising to 120s.  Pt then transferred from bed to w/c with use of RW with supervision and HR raising to 130s but stabilizing after sitting. Patient brought to rehab gym and educated on proper sequencing for stair negotiation. Pt then negotiated 4 steps with B rail and CGA. Pt then returned to sitting HR raised to high 130s but pt able to recover quickly. Pt reports her railings are far apart and unable to reach them both. Pt then directed in stair negotiation stepping laterally with use of R side rail. Pt demo negotiation of 4 steps with SBA. Pt then educated on car transfer with PT demonstrating proper sequencing to get in and out of car with pt verbalizing good understanding. Pt then returned to room and ambulated into bathroom and demo toileting with independence. Pt ambulated with RW to chair with pt reporting dizziness after standing. Pt's HR raised to 150 but returned back to normal after sitting for a few minutes. Pt reports having rollator at home. PT educates pt on use of brakes when bearing weight through [address] for stability with pt verbalizing good understanding. Nursing was notified as well as discussion of pt's performance on stairs. Pt cleared stairs at this time. Current Level of Function Impacting Discharge (mobility/balance): SBA/supervision with RW    Other factors to consider for discharge: pt waiting on ECHO         PLAN :  Patient continues to benefit from skilled intervention to address the above impairments. Continue treatment per established plan of care. to address goals. Recommendation for discharge: (in order for the patient to meet his/her long term goals)  Physical therapy at least 2 days/week in the home AND ensure assist and/or supervision for safety with functional mobility.      This discharge recommendation:  Has been made in collaboration with the attending provider and/or case management    IF patient discharges home will need the following DME: patient owns DME required for discharge       SUBJECTIVE:   Patient stated [facility] [place], [place] heart feels like it is fluttering when it raises.     OBJECTIVE DATA SUMMARY:   Critical Behavior:  Neurologic State: Alert, Eyes open spontaneously  Orientation Level: Oriented X4  Cognition: Follows commands  Safety/Judgement: Awareness of environment, Good awareness of safety precautions  Functional Mobility Training:  Bed Mobility:     Supine to Sit: Stand-by assistance (increased time)              Transfers:  Sit to Stand: Modified independent  Stand to Sit: Modified independent        Bed to Chair: Supervision                    Balance:  Sitting: Intact  Standing: Impaired  Standing - Static: Good;Constant support  Standing - Dynamic : Fair;Constant support  Ambulation/Gait Training:  Distance (ft): 70 Feet (ft) (35+35)  Assistive Device: Gait belt;Walker, rolling  Ambulation - Level of Assistance: Stand-by assistance        Gait Abnormalities: Antalgic;Decreased step clearance  Right Side Weight Bearing: As tolerated     Base of Support: Widened     Speed/Amita: Pace decreased (<100 feet/min)                       Stairs:  Number of Stairs Trained: 4  Stairs - Level of Assistance: Stand-by assistance;Contact guard assistance   Rail Use: Both    Therapeutic Exercises:     Pain Ratin/10    Activity Tolerance:   Fair    After treatment patient left in no apparent distress:   Sitting in chair and Call bell within reach    COMMUNICATION/COLLABORATION:   The patients plan of care was discussed with: Registered nurse.      Olga Lorenz, PT   Time Calculation: 39 mins

## 2023-04-01 NOTE — PROGRESS NOTES
Problem: Pressure Injury - Risk of  Goal: *Prevention of pressure injury  Description: Document Mikey Scale and appropriate interventions in the flowsheet. 4/1/2023 1446 by Vikas Nguyen, RN  Outcome: Resolved/Met  Note: Pressure Injury Interventions:  Sensory Interventions: Assess need for specialty bed, Assess changes in LOC, Chair cushion, Avoid rigorous massage over bony prominences, Discuss PT/OT consult with provider, Check visual cues for pain, Float heels, Keep linens dry and wrinkle-free, Maintain/enhance activity level, Monitor skin under medical devices, Minimize linen layers, Pad between skin to skin, Pressure redistribution bed/mattress (bed type)    Moisture Interventions: Absorbent underpads, Apply protective barrier, creams and emollients, Assess need for specialty bed, Contain wound drainage, Check for incontinence Q2 hours and as needed, Limit adult briefs, Minimize layers, Moisture barrier, Offer toileting Q_hr, Maintain skin hydration (lotion/cream)    Activity Interventions: Assess need for specialty bed, Chair cushion, Increase time out of bed, Pressure redistribution bed/mattress(bed type), PT/OT evaluation    Mobility Interventions: Chair cushion, Float heels, HOB 30 degrees or less, Pressure redistribution bed/mattress (bed type), PT/OT evaluation, Assess need for specialty bed, Turn and reposition approx.  every two hours(pillow and wedges)    Nutrition Interventions: Document food/fluid/supplement intake, Discuss nutritional consult with provider, Offer support with meals,snacks and hydration    Friction and Shear Interventions: Apply protective barrier, creams and emollients, Feet elevated on foot rest, Foam dressings/transparent film/skin sealants, HOB 30 degrees or less, Lift sheet, Lift team/patient mobility team             4/1/2023 1446 by Vikas Nguyen, RN  Outcome: Progressing Towards Goal  Note: Pressure Injury Interventions:  Sensory Interventions: Assess need for specialty bed, Assess changes in LOC, Chair cushion, Avoid rigorous massage over bony prominences, Discuss PT/OT consult with provider, Check visual cues for pain, Float heels, Keep linens dry and wrinkle-free, Maintain/enhance activity level, Monitor skin under medical devices, Minimize linen layers, Pad between skin to skin, Pressure redistribution bed/mattress (bed type)    Moisture Interventions: Absorbent underpads, Apply protective barrier, creams and emollients, Assess need for specialty bed, Contain wound drainage, Check for incontinence Q2 hours and as needed, Limit adult briefs, Minimize layers, Moisture barrier, Offer toileting Q_hr, Maintain skin hydration (lotion/cream)    Activity Interventions: Assess need for specialty bed, Chair cushion, Increase time out of bed, Pressure redistribution bed/mattress(bed type), PT/OT evaluation    Mobility Interventions: Chair cushion, Float heels, HOB 30 degrees or less, Pressure redistribution bed/mattress (bed type), PT/OT evaluation, Assess need for specialty bed, Turn and reposition approx.  every two hours(pillow and wedges)    Nutrition Interventions: Document food/fluid/supplement intake, Discuss nutritional consult with provider, Offer support with meals,snacks and hydration    Friction and Shear Interventions: Apply protective barrier, creams and emollients, Feet elevated on foot rest, Foam dressings/transparent film/skin sealants, HOB 30 degrees or less, Lift sheet, Lift team/patient mobility team             4/1/2023 0915 by Moshe Maldonado RN  Outcome: Progressing Towards Goal  Note: Pressure Injury Interventions:  Sensory Interventions: Assess need for specialty bed, Assess changes in LOC, Chair cushion, Avoid rigorous massage over bony prominences, Discuss PT/OT consult with provider, Check visual cues for pain, Float heels, Keep linens dry and wrinkle-free, Maintain/enhance activity level, Monitor skin under medical devices, Minimize linen layers, Pad between skin to skin, Pressure redistribution bed/mattress (bed type)    Moisture Interventions: Absorbent underpads, Apply protective barrier, creams and emollients, Assess need for specialty bed, Contain wound drainage, Check for incontinence Q2 hours and as needed, Limit adult briefs, Minimize layers, Moisture barrier, Offer toileting Q_hr, Maintain skin hydration (lotion/cream)    Activity Interventions: Assess need for specialty bed, Chair cushion, Increase time out of bed, Pressure redistribution bed/mattress(bed type), PT/OT evaluation    Mobility Interventions: Chair cushion, Float heels, HOB 30 degrees or less, Pressure redistribution bed/mattress (bed type), PT/OT evaluation, Assess need for specialty bed, Turn and reposition approx.  every two hours(pillow and wedges)    Nutrition Interventions: Document food/fluid/supplement intake, Discuss nutritional consult with provider, Offer support with meals,snacks and hydration    Friction and Shear Interventions: Apply protective barrier, creams and emollients, Feet elevated on foot rest, Foam dressings/transparent film/skin sealants, HOB 30 degrees or less, Lift sheet, Lift team/patient mobility team                Problem: Patient Education: Go to Patient Education Activity  Goal: Patient/Family Education  4/1/2023 1446 by Vasyl Crane RN  Outcome: Resolved/Met  4/1/2023 1446 by Vasyl Crane RN  Outcome: Progressing Towards Goal  4/1/2023 0915 by Vasyl Crane RN  Outcome: Progressing Towards Goal     Problem: Patient Education: Go to Patient Education Activity  Goal: Patient/Family Education  4/1/2023 1446 by Vasyl Crane RN  Outcome: Resolved/Met  4/1/2023 1446 by Vasyl Crane RN  Outcome: Progressing Towards Goal  4/1/2023 0915 by Vasyl Crane RN  Outcome: Progressing Towards Goal     Problem: Patient Education: Go to Patient Education Activity  Goal: Patient/Family Education  4/1/2023 1446 by Vasyl Crane RN  Outcome: Resolved/Met  4/1/2023 1446 by Michelle Smith RN  Outcome: Progressing Towards Goal  4/1/2023 0915 by Michelle Smith RN  Outcome: Progressing Towards Goal

## 2023-04-01 NOTE — PROGRESS NOTES
[facility] Progress Note      Patient Name:  Milady Rankin  YOB: 1941  Medical Record Number:  [id number]    Date of visit: 12/18/2017    CHIEF COMPLAINT: Metastatic HER-2 positive breast carcinoma.     HPI:      78year old that I ha Transition of Care Plan:     RUR: n/a outpatient   Disposition: Home with [facility] PT/OT [facility] 4/5/23  Follow up appointments: ortho  DME needed: owns dme needed  Transportation at Discharge: family   Tam Sirdalia or means to access home:  family to provide       IM Medicare Letter: n/a  Caregiver Contact: yenni Brantley [phone]  Discharge Caregiver contacted prior to discharge? To be contacted upon pt request  Care Conference needed?:  no    1:56PM UPDATE  CM informed by nursing of plan to d/c pt today. PT/OT recommending [facility] PT/OT services. Referral was previously sent to [facility], but they are unable to accept pt for [facility] before 4/5/23. Additional referrals sent to the following agencies (below). CM sent additional referrals below: [facility] - denied, out of network   [facility] - denied, out of network   [facility] - denied, out of network   [facility] - denied, out of network   [facility] - denied, out of network   [facility] - denied, out of network   [facility] - denied, out of network   [facility] - still pending   [facility] - pending   [facility] - pending   [facility] - pending   [facility] - pending   [facility] - pending   [facility] - pending   [facility] - pending   [facility] - pending   [facility] - pending   [facility] - pending   [facility] - pending    2:30PM UPDATE  Pt spoke with [facility] and is okay with [facility] delayed until 4/5/23, MD also signed off as pt confirmed she will have family assistance until services start. Pt to call and schedule PCP, Ortho, and Echocardiogram follow-up appointments as offices are closed at this time. Family to transport home. All information entered into pt AVS.     Pt has no additional CM needs at this time    Care Management Interventions  PCP Verified by CM: Yes  Palliative Care Criteria Met (RRAT>21 & CHF Dx)?: No  Mode of Transport at Discharge:  Other (see comment) (family)  Transition of Care Consult (CM Consult): [address]: Yes  Discharge Allergies    MEDICATIONS:      Current Outpatient Prescriptions:  Loperamide HCl (IMODIUM A-D) 2 MG Oral Tab Take 2 mg by mouth 4 (four) times daily as needed for Diarrhea.  Disp:  Rfl:    Metoprolol Succinate ER 25 MG Oral Tablet 24 Hr Take 1 tablet (25 mg Durable Medical Equipment: No  Health Maintenance Reviewed: Yes  Physical Therapy Consult: Yes  Occupational Therapy Consult: Yes  Speech Therapy Consult: No  Support Systems: Other Family Member(s), Friend/Neighbor (niece and best friend)  Confirm Follow Up Transport: Family  The Plan for Transition of Care is Related to the Following Treatment Goals : HH PT/OT  The Patient and/or Patient Representative was Provided with a Choice of Provider and Agrees with the Discharge Plan?: Yes  Name of the Patient Representative Who was Provided with a Choice of Provider and Agrees with the Discharge Plan: Clemente Crane of Choice List was Provided with Basic Dialogue that Supports the Patient's Individualized Plan of Care/Goals, Treatment Preferences and Shares the Quality Data Associated with the Providers?: Yes  Discharge Location  Patient Expects to be Discharged to[de-identified] Home with home health    Maddie Lees, [address] [facility]  [phone] U/L   Bilirubin, Total 0.2 0.1 - 2.0 mg/dL   Total Protein 6.9 6.1 - 8.3 g/dL   Albumin 3.1 (L) 3.5 - 4.8 g/dL   Sodium 141 136 - 144 mmol/L   Potassium 3.1 (L) 3.6 - 5.1 mmol/L   Chloride 109 101 - 111 mmol/L   CO2 27.0 22.0 - 32.0 mmol/L   -CBC W/ DIFFER started Faslodex /trastuzumab on 6/5/17. Restaging CT 8/17  showed  Progression of liver metastasis. She started dual antibody blockade with Pertuzumab/trastuzumab on 8/28/17.   Given the indolent nature of the disease, I elected to hold off on chemothera

## 2023-04-01 NOTE — PROGRESS NOTES
Dr. Segun Mae notified via perfect serve that Dr. Noel Benitez has been notified and he stated that echo can be done outpatient and pt does not need to stay until Monday to have echo prior to discharge. is it okay to discharge pt home today since she is voiding, HGB is stable, and she cleared physical therapy today? Call back number [phone] provided.

## 2023-04-01 NOTE — PROGRESS NOTES
Dr. Kalpesh Betts notified to see if echo order can be changed to stat instead of routine and if STAT which MD will be reading the echo results, or if echo can be done outpatient as currently this is the only barrier for discharge. Call back number [phone] given.  Orders given that echo can be done outpatient, will notify ortho of this

## 2023-04-01 NOTE — PROGRESS NOTES
DISCHARGE NOTE FROM [facility]    Patient determined to be stable for discharge by attending provider. I have reviewed the discharge instructions with the patient, son, and other neice . They verbalized understanding and all questions were answered to their satisfaction. No complaints or further questions were expressed. Medications sent to pharmacy. Appropriate educational materials and medication side effect teaching were provided. PIV were removed prior to discharge. Patient did not discharge with any line, gaines, or drain. Personal items and valuables accounted for at discharge by patient and/or family: YES    Post-op patient: Yes- Patient given post-op discharge kit and instructed on use.        John Leo RN

## 2023-04-01 NOTE — PROGRESS NOTES
Problem: Falls - Risk of  Goal: *Absence of Falls  Description: Document Autumn Kaur Fall Risk and appropriate interventions in the flowsheet.   4/1/2023 0436 by Vasyl Crane RN  Outcome: Progressing Towards Goal  Note: Fall Risk Interventions:  Mobility Interventions: Assess mobility with egress test, Communicate number of staff needed for ambulation/transfer, OT consult for ADLs, Patient to call before getting OOB, PT Consult for assist device competence, Strengthening exercises (ROM-active/passive), PT Consult for mobility concerns, Utilize walker, cane, or other assistive device, Utilize gait belt for transfers/ambulation, Bed/chair exit alarm         Medication Interventions: Assess postural VS orthostatic hypotension, Bed/chair exit alarm, Evaluate medications/consider consulting pharmacy, Patient to call before getting OOB, Teach patient to arise slowly, Utilize gait belt for transfers/ambulation    Elimination Interventions: Bed/chair exit alarm, Call light in reach, Elevated toilet seat, Patient to call for help with toileting needs, Toilet paper/wipes in reach, Toileting schedule/hourly rounds, Stay With Me (per policy)           0/5/6468 0914 by Vasyl Crane RN  Outcome: Progressing Towards Goal  Note: Fall Risk Interventions:  Mobility Interventions: Assess mobility with egress test, Communicate number of staff needed for ambulation/transfer, OT consult for ADLs, Patient to call before getting OOB, PT Consult for assist device competence, Strengthening exercises (ROM-active/passive), PT Consult for mobility concerns, Utilize walker, cane, or other assistive device, Utilize gait belt for transfers/ambulation, Bed/chair exit alarm         Medication Interventions: Assess postural VS orthostatic hypotension, Bed/chair exit alarm, Evaluate medications/consider consulting pharmacy, Patient to call before getting OOB, Teach patient to arise slowly, Utilize gait belt for transfers/ambulation    Elimination Interventions: Bed/chair exit alarm, Call light in reach, Elevated toilet seat, Patient to call for help with toileting needs, Toilet paper/wipes in reach, Toileting schedule/hourly rounds, Stay With Me (per policy)              Problem: Patient Education: Go to Patient Education Activity  Goal: Patient/Family Education  4/1/2023 1446 by Libby Diehl RN  Outcome: Progressing Towards Goal  4/1/2023 0915 by Libby Diehl RN  Outcome: Progressing Towards Goal     Problem: Pressure Injury - Risk of  Goal: *Prevention of pressure injury  Description: Document Mikey Scale and appropriate interventions in the flowsheet. 4/1/2023 1446 by Libby Diehl RN  Outcome: Progressing Towards Goal  Note: Pressure Injury Interventions:  Sensory Interventions: Assess need for specialty bed, Assess changes in LOC, Chair cushion, Avoid rigorous massage over bony prominences, Discuss PT/OT consult with provider, Check visual cues for pain, Float heels, Keep linens dry and wrinkle-free, Maintain/enhance activity level, Monitor skin under medical devices, Minimize linen layers, Pad between skin to skin, Pressure redistribution bed/mattress (bed type)    Moisture Interventions: Absorbent underpads, Apply protective barrier, creams and emollients, Assess need for specialty bed, Contain wound drainage, Check for incontinence Q2 hours and as needed, Limit adult briefs, Minimize layers, Moisture barrier, Offer toileting Q_hr, Maintain skin hydration (lotion/cream)    Activity Interventions: Assess need for specialty bed, Chair cushion, Increase time out of bed, Pressure redistribution bed/mattress(bed type), PT/OT evaluation    Mobility Interventions: Chair cushion, Float heels, HOB 30 degrees or less, Pressure redistribution bed/mattress (bed type), PT/OT evaluation, Assess need for specialty bed, Turn and reposition approx.  every two hours(pillow and wedges)    Nutrition Interventions: Document food/fluid/supplement intake, Discuss nutritional consult with provider, Offer support with meals,snacks and hydration    Friction and Shear Interventions: Apply protective barrier, creams and emollients, Feet elevated on foot rest, Foam dressings/transparent film/skin sealants, HOB 30 degrees or less, Lift sheet, Lift team/patient mobility team             4/1/2023 0915 by Denisse Herrera RN  Outcome: Progressing Towards Goal  Note: Pressure Injury Interventions:  Sensory Interventions: Assess need for specialty bed, Assess changes in LOC, Chair cushion, Avoid rigorous massage over bony prominences, Discuss PT/OT consult with provider, Check visual cues for pain, Float heels, Keep linens dry and wrinkle-free, Maintain/enhance activity level, Monitor skin under medical devices, Minimize linen layers, Pad between skin to skin, Pressure redistribution bed/mattress (bed type)    Moisture Interventions: Absorbent underpads, Apply protective barrier, creams and emollients, Assess need for specialty bed, Contain wound drainage, Check for incontinence Q2 hours and as needed, Limit adult briefs, Minimize layers, Moisture barrier, Offer toileting Q_hr, Maintain skin hydration (lotion/cream)    Activity Interventions: Assess need for specialty bed, Chair cushion, Increase time out of bed, Pressure redistribution bed/mattress(bed type), PT/OT evaluation    Mobility Interventions: Chair cushion, Float heels, HOB 30 degrees or less, Pressure redistribution bed/mattress (bed type), PT/OT evaluation, Assess need for specialty bed, Turn and reposition approx.  every two hours(pillow and wedges)    Nutrition Interventions: Document food/fluid/supplement intake, Discuss nutritional consult with provider, Offer support with meals,snacks and hydration    Friction and Shear Interventions: Apply protective barrier, creams and emollients, Feet elevated on foot rest, Foam dressings/transparent film/skin sealants, HOB 30 degrees or less, Lift sheet, Lift team/patient mobility team                Problem: Patient Education: Go to Patient Education Activity  Goal: Patient/Family Education  4/1/2023 1446 by Sumeet Dotson RN  Outcome: Progressing Towards Goal  4/1/2023 0915 by Sumeet Dotson RN  Outcome: Progressing Towards Goal     Problem: Patient Education: Go to Patient Education Activity  Goal: Patient/Family Education  4/1/2023 1446 by Sumeet Dotson RN  Outcome: Progressing Towards Goal  4/1/2023 0915 by Sumeet Dotson RN  Outcome: Progressing Towards Goal     Problem: Patient Education: Go to Patient Education Activity  Goal: Patient/Family Education  4/1/2023 1446 by Sumeet Dotson RN  Outcome: Progressing Towards Goal  4/1/2023 0915 by Sumeet Dotson RN  Outcome: Progressing Towards Goal     Problem: Patient Education: Go to Patient Education Activity  Goal: Patient/Family Education  4/1/2023 1446 by Sumeet Dotson RN  Outcome: Progressing Towards Goal  4/1/2023 0915 by Sumeet Dotson RN  Outcome: Progressing Towards Goal

## 2023-04-01 NOTE — PROGRESS NOTES
Problem: Falls - Risk of  Goal: *Absence of Falls  Description: Document Ramirez Pascal Fall Risk and appropriate interventions in the flowsheet. Outcome: Progressing Towards Goal  Note: Fall Risk Interventions:  Mobility Interventions: Assess mobility with egress test, Communicate number of staff needed for ambulation/transfer, OT consult for ADLs, Patient to call before getting OOB, PT Consult for assist device competence, Strengthening exercises (ROM-active/passive), PT Consult for mobility concerns, Utilize walker, cane, or other assistive device, Utilize gait belt for transfers/ambulation, Bed/chair exit alarm         Medication Interventions: Assess postural VS orthostatic hypotension, Bed/chair exit alarm, Evaluate medications/consider consulting pharmacy, Patient to call before getting OOB, Teach patient to arise slowly, Utilize gait belt for transfers/ambulation    Elimination Interventions: Bed/chair exit alarm, Call light in reach, Elevated toilet seat, Patient to call for help with toileting needs, Toilet paper/wipes in reach, Toileting schedule/hourly rounds, Stay With Me (per policy)              Problem: Patient Education: Go to Patient Education Activity  Goal: Patient/Family Education  Outcome: Progressing Towards Goal     Problem: Pressure Injury - Risk of  Goal: *Prevention of pressure injury  Description: Document Mikey Scale and appropriate interventions in the flowsheet.   Outcome: Progressing Towards Goal  Note: Pressure Injury Interventions:  Sensory Interventions: Assess need for specialty bed, Assess changes in LOC, Chair cushion, Avoid rigorous massage over bony prominences, Discuss PT/OT consult with provider, Check visual cues for pain, Float heels, Keep linens dry and wrinkle-free, Maintain/enhance activity level, Monitor skin under medical devices, Minimize linen layers, Pad between skin to skin, Pressure redistribution bed/mattress (bed type)    Moisture Interventions: Absorbent underpads, Apply protective barrier, creams and emollients, Assess need for specialty bed, Contain wound drainage, Check for incontinence Q2 hours and as needed, Limit adult briefs, Minimize layers, Moisture barrier, Offer toileting Q_hr, Maintain skin hydration (lotion/cream)    Activity Interventions: Assess need for specialty bed, Chair cushion, Increase time out of bed, Pressure redistribution bed/mattress(bed type), PT/OT evaluation    Mobility Interventions: Chair cushion, Float heels, HOB 30 degrees or less, Pressure redistribution bed/mattress (bed type), PT/OT evaluation, Assess need for specialty bed, Turn and reposition approx.  every two hours(pillow and wedges)    Nutrition Interventions: Document food/fluid/supplement intake, Discuss nutritional consult with provider, Offer support with meals,snacks and hydration    Friction and Shear Interventions: Apply protective barrier, creams and emollients, Feet elevated on foot rest, Foam dressings/transparent film/skin sealants, HOB 30 degrees or less, Lift sheet, Lift team/patient mobility team                Problem: Patient Education: Go to Patient Education Activity  Goal: Patient/Family Education  Outcome: Progressing Towards Goal     Problem: Patient Education: Go to Patient Education Activity  Goal: Patient/Family Education  Outcome: Progressing Towards Goal     Problem: Patient Education: Go to Patient Education Activity  Goal: Patient/Family Education  Outcome: Progressing Towards Goal     Problem: Patient Education: Go to Patient Education Activity  Goal: Patient/Family Education  Outcome: Progressing Towards Goal

## 2023-04-01 NOTE — PROGRESS NOTES
Dr. Kevin Hernández notified via perfect serve just spoke with case management Gabe Leventhal since pt is recommending home health, with her insurance case management stated that it may not be able to be confirmed until monday that an agency can come out to her house. is it still okay for discharge? Call back number [phone] given. Dr. Kevin Hernández notified that pt was spoken to and stated that she has a niece and son at home that can assist her in the interim until home health can come see her and is willing to do the echo outpatient.

## 2023-04-01 NOTE — DISCHARGE SUMMARY
Discharge Summary     Patient: Vane Staley MRN: [id number]  SSN: [id number]    YOB: 1970  Age: 48 y.o. Sex: female       Admit Date: 3/30/2023    Discharge Date: 4/1/2023      Admission Diagnoses: Primary localized osteoarthrosis of right hip [M16.11]; Unilateral primary osteoarthritis, right hip [M16.11]; Arthritis of right hip [M16.11]    Discharge Diagnoses:   Problem List as of 4/1/2023 Date Reviewed: 4/1/2023            Codes Class Noted - Resolved    Sinus tachycardia ICD-10-CM: R00.0  ICD-9-CM: 427.89  4/1/2023 - Present        * (Principal) Unilateral primary osteoarthritis, right hip ICD-10-CM: M16.11  ICD-9-CM: 715.15  3/30/2023 - Present        Bilateral primary osteoarthritis of hip ICD-10-CM: M16.0  ICD-9-CM: 715.15  1/15/2020 - Present        Obesity, morbid ([facility] [address] ICD-10-CM: E66.01  ICD-9-CM: 278.01  1/10/2020 - Present        RESOLVED: Arthritis of right hip ICD-10-CM: M16.11  ICD-9-CM: 716.95  4/1/2023 - 4/1/2023        RESOLVED: Postoperative ileus ([facility] [address] ICD-10-CM: K91.89, K56.7  ICD-9-CM: 997.49, 560.1  9/16/2021 - 1/18/2023        RESOLVED: Umbilical hernia with obstruction, without gangrene ICD-10-CM: K42.0  ICD-9-CM: 552.1  9/11/2021 - 1/18/2023        RESOLVED: Umbilical hernia with obstruction ICD-10-CM: K42.0  ICD-9-CM: 552.1  9/11/2021 - 1/18/2023            Discharge Condition: Stable    Hospital Course: Lopez Carty was admitted postoperatively from a Right total hip arthroplasty. During the hospital stay she was found to have a tachycardia and cardiology was consulted. She was observed on telemetry and recommendation was made for an outpatient echocardiogram. She was stable on post operative day 1 and 2. She was tolerating a regular diet and pain was controlled with oral pain medication.  Physical and Occupational therapy were consulted and performed a safety evaluation and made a recommendation for home health PT and discharge with an assistant for supervision with mobility    Consults: Cardiology    Significant Diagnostic Studies: None    Disposition: home with assist.    Discharge Medications:   Current Discharge Medication List        START taking these medications    Details   aspirin delayed-release 325 mg tablet Take 1 Tablet by mouth two (2) times a day for 30 days. Qty: 60 Tablet, Refills: 0      famotidine (PEPCID) 20 mg tablet Take 1 Tablet by mouth two (2) times a day for 30 days. Qty: 60 Tablet, Refills: 0      oxyCODONE IR (ROXICODONE) 5 mg immediate release tablet Take 1 Tablet by mouth every four (4) hours as needed for Pain for up to 7 days. Max Daily Amount: 30 mg.  Qty: 30 Tablet, Refills: 0    Associated Diagnoses: Status post total replacement of right hip      erythromycin (ILOTYCIN) ophthalmic ointment One drop to left eye three times a day. Indications: corneal abrasion  Qty: 1 g, Refills: 0      senna-docusate (PERICOLACE) 8.6-50 mg per tablet Take 1 Tablet by mouth two (2) times a day for 7 days. Qty: 14 Tablet, Refills: 0      polyethylene glycol (MIRALAX) 17 gram packet Take 1 Packet by mouth daily for 7 days. Qty: 7 Packet, Refills: 0           CONTINUE these medications which have NOT CHANGED    Details   magnesium 250 mg tab Take  by mouth. acetaminophen (TYLENOL) 650 mg TbER Take 650 mg by mouth every eight (8) hours. diclofenac EC (VOLTAREN) 75 mg EC tablet TAKE 1 TABLET BY MOUTH TWICE A DAY  Qty: 60 Tablet, Refills: 0      cyanocobalamin 1,000 mcg tablet Take 1,000 mcg by mouth daily. TURMERIC PO Take  by mouth Three (3) times a week. multivitamin (ONE A DAY) tablet Take 1 Tablet by mouth daily. metFORMIN ER (GLUCOPHAGE XR) 500 mg tablet Take 1 Tablet by mouth daily (with dinner).   Qty: 90 Tablet, Refills: 1    Associated Diagnoses: Prediabetes             Activity: Activity as tolerated, Ambulate in house, No lifting, Driving, or Strenuous exercise for 4 weeks , No heavy lifting, pushing, pulling with the implant side for 2 months, PT/OT per Home Health, and See surgical instructions  Diet: Resume previous diet  Wound Care: Keep wound clean and dry and As directed    Follow-up Appointments   Procedures    FOLLOW UP VISIT Appointment in: Two Weeks     Standing Status:   Standing     Number of Occurrences:   1     Order Specific Question:   Appointment in     Answer:    Two Weeks       Signed By: Frandy Levy MD     April 1, 2023

## 2023-04-01 NOTE — PROGRESS NOTES
End of Shift Note    Bedside shift change report given to laura BOSTON  (oncoming nurse) by Vadim Simpson RN (offgoing nurse). Report included the following information SBAR    Shift worked:  [id number]     Shift summary and any significant changes:     No changes. Heart Rate is still Tachy upon standing and moving. Echo is still pending. Concerns for physician to address:  No     Zone phone for oncoming shift:   [phone]       Activity:  Activity Level: Up with Assistance  Number times ambulated in hallways past shift: 0  Number of times OOB to chair past shift: 0    Cardiac:   Cardiac Monitoring: Yes      Cardiac Rhythm: Sinus Rhythm    Access:  Current line(s): PIV     Genitourinary:   Urinary status: voiding    Respiratory:   O2 Device: None (Room air)  Chronic home O2 use?: NO  Incentive spirometer at bedside: YES  Actual Volume (ml): 2500 ml    GI:  Last Bowel Movement Date: 03/30/23  Current diet:  ADULT DIET Regular; 3 carb choices (45 gm/meal)  DIET ONE TIME MESSAGE  Passing flatus: YES  Tolerating current diet: YES       Pain Management:   Patient states pain is manageable on current regimen: YES    Skin:  Mieky Score: 18  Interventions: speciality bed, float heels, increase time out of bed, and PT/OT consult    Patient Safety:  Fall Score:  Total Score: 3  Interventions: bed/chair alarm, assistive device (walker, cane, etc), gripper socks, and pt to call before getting OOB  High Fall Risk: Yes    Length of Stay:  Expected LOS: - - -  Actual LOS: 0      Vadim Simpson RN

## 2023-04-03 NOTE — ANESTHESIA PREPROCEDURE EVALUATION
Incoming call for Venclext[employer] refill, but RTS on insurance until 4/4/23. Patient reports taking medication appropriate, 2 tablets daily. Has 2 days on hand for 4/4 and 4/5 before starting her 7 days off window. Will need shipment by 4/12.    Routing assigned [facility] for call back tomorrow to schedule shipment.    Relevant Problems   ENDOCRINE   (+) Obesity, morbid (HCC)       Anesthetic History   No history of anesthetic complications            Review of Systems / Medical History  Patient summary reviewed, nursing notes reviewed and pertinent labs reviewed    Pulmonary              Pertinent negatives: No COPD, asthma and smoker     Neuro/Psych   Within defined limits        Pertinent negatives: No seizures and CVA   Cardiovascular  Within defined limits              Pertinent negatives: No hypertension, past MI and CHF  Exercise tolerance: >4 METS  Comments: EKG (09/2021): ST   GI/Hepatic/Renal                Endo/Other        Morbid obesity     Other Findings   Comments: Spinal stenosis  Hx of endometritis s/p hysterectomy            Physical Exam    Airway  Mallampati: IV  TM Distance: > 6 cm  Neck ROM: normal range of motion   Mouth opening: Normal     Cardiovascular  Regular rate and rhythm,  S1 and S2 normal,  no murmur, click, rub, or gallop  Rhythm: regular  Rate: normal         Dental  No notable dental hx       Pulmonary  Breath sounds clear to auscultation               Abdominal  GI exam deferred       Other Findings            Anesthetic Plan    ASA: 3  Anesthesia type: general    Monitoring Plan: BIS      Induction: Intravenous  Anesthetic plan and risks discussed with: Patient      GETA with RSI  Morbid obesity (BMI >40): Consider high PEEP and elevating head of bed prior to extubation with nasal trumpet/OA  Minimal long acting narcotics; patient became hypoxic with narcotic administration on the floor.   Higher PONV risk

## 2023-04-05 ENCOUNTER — HOME CARE VISIT (OUTPATIENT)
Dept: SCHEDULING | Facility: HOME HEALTH | Age: 53
End: 2023-04-05
Payer: COMMERCIAL

## 2023-04-05 PROCEDURE — G0151 HHCP-SERV OF PT,EA 15 MIN: HCPCS

## 2023-04-06 NOTE — HOME HEALTH
Skilled reason for admission/summary of clinical condition: Patient is a 48 -year-old female with a recent hospitalization(RT HIP OA, S/P THR). Patient now presents with difficulty in ambulating, dec transfer, decrease RT LE strength. Patient lives in a single-story home by herself with assistance as needed from the family and was Mod independent in her ambulation using a cane/RW dependent on the distance. Patient must manage 4 steps to get in/out of the house and currently assisted by the caregiver. will benefit from skilled PT at this time to address her functional deficits- so that the patient can ambulate/transfer without assistance from the caregiver safely with less risk of falling  Diagnosis: DM , S/P RT THR  Caregiver: Daughter/SON is the primary caregiver and Will be available as needed (son was  present during the eval.  Caregiver unable to assist with: Transportation. Caregiver is available Inconsistently Caregiver is present at this visit and did participate with clinician. Medications reconciled and all medications are available in the home this visit. The following education was provided regarding medications: medication interactions and look alike medications: . Patient/CG able to demonstrate knowledge through teach back with 100 percent accuracy. Medications are effective at this time. MD notified of any discrepancies/medication interactions -None/medications reviewed with no issues noted. . A list of reconciled medications has been given to the patient/caregiver and a copy has been uploaded to media. Home health supplies by type and quantity ordered/delivered this visit include: None  Patient/caregiver instructed on plan of care and are agreeable to plan of care at this time. Clinician reviewed orientation to home health booklet with patient/caregiver including agency phone number, agency complaint process, state hotline number, as well as joint commission's quality hotline number. Consent forms signed. Patient at risk for falls Yes:   Recommended requesting PT orders due to fall risk YES:   Patient response to recommended requesting of PT orders: Agreed with POC  Discharge planning discussed with patient and caregiver. Discharge planning as follows: Will discharge when the patient has reached their maximum functional potential and maximum safety in their home Patient/caregiver did verbalize agreement with discharge planning. Clinical Assessment (What this means for the patient overall and need for ongoing skilled care: Patient is a 48 -year-old female with a recent hospitalization(RT HIP OA, S/P THR). Patient now presents with difficulty in ambulating, dec transfer, decrease RT LE strength. Patient lives in a single-story home by herself with assistance as needed from the family and was Mod independent in her ambulation using a cane/RW dependent on the distance. Patient must manage 4 steps to get in/out of the house and currently assisted by the caregiver. will benefit from skilled PT at this time to address her functional deficits- so that the patient can ambulate/transfer without assistance from the caregiver safely with less risk of falling  Specific plan for next visit: RT LE resisted exercises, and transfer training    - so that the patient can transfer with less possible assistance from the caregiver. Plan of care and admission to home health status called to attending physician.  The following practitioner has agreed to sign the ongoing POC : Marli Monroy DO  , and was notified of the following:  admission to home health status, plan of care including visit frequency of 2 weekx1, [id number], [id number]  requesting additional services as follows: none, medication concerns as follows: none, clarifications on: none    Interdisciplinary communication with:  none    PCP: Babs Dumont NP    Next scheduled doctor appointment: TBD, Patient/Caregiver instructed to keep follow up appointment because lack of follow through with physician appointments could result in discontinuation of home care services for non-compliance. Patient/Caregiver verbalize knowledge of above through teach back with 100 percent accuracy. Emergency Preparedness: Patient/Caregiver instructed in the following:  Have one gallon of water per person for at least 3 days on hand. Have non-perishable food for at least 3 days that do not need to be cooked. Have flashlights and batteries. Charge your cell phones and any back up lithium batteries for your cell phones. Have 3+ days of back up oxygen in your home. Have a phone in your home that is hard wired and does not require power. Have medication for a week in your home. Make sure you have a caregiver in the home to provide care in case your home health nurse cannot get to your house. Make sure you have all of your paperwork i.e. written emergency preparedness plan, Identification, insurance cards, DME phone number, physician and pharmacy phone number, agency phone number, and your medications in one place for easy access and in a zip lock bag to protect them. Take your Admission Handbook, written emergency preparedness plan, written medication list and folder if you relocate in the event of an emergency, if possible. Call agency if you relocate so we can contact you. Patient/Caregiver verbalize knowledge of above through teach back with 100% percent accuracy.

## 2023-04-07 ENCOUNTER — HOME CARE VISIT (OUTPATIENT)
Dept: SCHEDULING | Facility: HOME HEALTH | Age: 53
End: 2023-04-07
Payer: COMMERCIAL

## 2023-04-07 ENCOUNTER — TELEPHONE (OUTPATIENT)
Dept: ORTHOPEDIC SURGERY | Age: 53
End: 2023-04-07

## 2023-04-17 ENCOUNTER — HOME CARE VISIT (OUTPATIENT)
Dept: SCHEDULING | Facility: HOME HEALTH | Age: 53
End: 2023-04-17
Payer: COMMERCIAL

## 2023-04-17 VITALS
OXYGEN SATURATION: 98 % | RESPIRATION RATE: 16 BRPM | DIASTOLIC BLOOD PRESSURE: 78 MMHG | HEART RATE: 71 BPM | SYSTOLIC BLOOD PRESSURE: 118 MMHG | TEMPERATURE: 98 F

## 2023-04-17 PROCEDURE — G0151 HHCP-SERV OF PT,EA 15 MIN: HCPCS

## 2023-04-17 NOTE — HOME HEALTH
Subjective: I am doing good and want to do the steps   I Falls since last visit (if yes include. bsrifallreport): None reported by the patient/caregiver. Caregiver involvement: Yes- patients daughter will be available as needed, was not present today. Home health supplies by type and quantity ordered/delivered this visit include none today. Does the patient have any new or changed medications?no  If Yes, were medications reconciled? na.  Was the certifying physician notified of changes in medications? na.  Clinical assessment (what this visit means for the patient overall and need for ongoing skilled care): PT interventions including DC instructions, HEP reedcuation, RT LE  resisted /ROM exercises, gait training  training and safety education-Patient was able to repeat back all the HEP with 100% accuracy. .  Progress or lack of progress toward specific goals: met all the goals  Inter disciplinary communication: na  Discharge planning: DC when met all the goals/DC when home PT is no longer appropriate/ DC when patient is no longer home bound/dc as of today  Specific plan for next visit: NAVEEN 73 year-old male with PMH of insulin-dependent DM c/b diabetic foot ulcers s/p amputations of half of left great toe and entire right great toe, PVD, HTN, HLD, GERD, and depression who presents with worsening of a previously diagnosed R third toe wound. Patient was recently admitted to [facility] from 11/14-11/21 for worsening of the same diabetic foot ulcer of right foot with OM with cultures growing MSSA; patient had PICC line placed prior to discharge and was receiving PO Flagyl 500 mg and IV Rocephin 2 g with scheduled ID (Dr. Gary Turpin) follow-up.    Today, patient presents with 73 year-old male with PMH of insulin-dependent DM c/b diabetic foot ulcers s/p amputations of half of left great toe and entire right great toe, PVD, HTN, HLD, GERD, and depression who presents with worsening of a previously diagnosed R third toe wound. Patient was recently admitted to [facility] from 11/14-11/21 for worsening of the same diabetic foot ulcer of right foot with OM with cultures growing MSSA; patient had PICC line placed prior to discharge and was receiving PO Flagyl 500 mg BID and IV Rocephin 2 g qD with scheduled ID (Dr. Gary Turpin) follow-up.    Today, patient presents with mild pain and numbness in right third toe. Per patient and wife at bedside, he had appointment with Vascular Surgery scheduled for today but decided to come to the ED due to worsening appearance of toe (was starting to appear darker) in addition to feeling short of breath, fatigue, and loss of appetite for past three days. + Pain in R foot, + loss of appetite, + fatigue, + shortness of breath x3 days. Denies fever/chills/HA/change in vision/rhinorrhea/sore throat/cough/chest pain/abdominal pain/constipation/diarrhea.

## 2023-04-18 ENCOUNTER — HOSPITAL ENCOUNTER (OUTPATIENT)
Dept: GENERAL RADIOLOGY | Age: 53
Discharge: HOME | End: 2023-04-18
Payer: COMMERCIAL

## 2023-04-18 ENCOUNTER — TELEPHONE (OUTPATIENT)
Dept: ORTHOPEDIC SURGERY | Age: 53
End: 2023-04-18

## 2023-04-18 ENCOUNTER — OFFICE VISIT (OUTPATIENT)
Dept: ORTHOPEDIC SURGERY | Age: 53
End: 2023-04-18
Payer: COMMERCIAL

## 2023-04-18 VITALS
BODY MASS INDEX: 44.12 KG/M2 | WEIGHT: 249 LBS | DIASTOLIC BLOOD PRESSURE: 79 MMHG | TEMPERATURE: 97.7 F | HEART RATE: 94 BPM | SYSTOLIC BLOOD PRESSURE: 112 MMHG | OXYGEN SATURATION: 96 % | HEIGHT: 63 IN | RESPIRATION RATE: 18 BRPM

## 2023-04-18 DIAGNOSIS — Z96.641 AFTERCARE FOLLOWING RIGHT HIP JOINT REPLACEMENT SURGERY: ICD-10-CM

## 2023-04-18 DIAGNOSIS — Z96.641 AFTERCARE FOLLOWING RIGHT HIP JOINT REPLACEMENT SURGERY: Primary | ICD-10-CM

## 2023-04-18 DIAGNOSIS — Z47.1 AFTERCARE FOLLOWING RIGHT HIP JOINT REPLACEMENT SURGERY: ICD-10-CM

## 2023-04-18 DIAGNOSIS — Z47.1 AFTERCARE FOLLOWING RIGHT HIP JOINT REPLACEMENT SURGERY: Primary | ICD-10-CM

## 2023-04-18 PROCEDURE — 73502 X-RAY EXAM HIP UNI 2-3 VIEWS: CPT

## 2023-04-18 PROCEDURE — 99024 POSTOP FOLLOW-UP VISIT: CPT | Performed by: ORTHOPAEDIC SURGERY

## 2023-04-18 NOTE — PROGRESS NOTES
is here for a follow up visit from a total hip arthroplasty on the right side. The patient is doing well, with no medical complications since discharge. Pain has been appropriate since surgery,and the patient is progressing well with physical therapy. Current Outpatient Medications on File Prior to Visit   Medication Sig Dispense Refill    magnesium 250 mg tab Take  by mouth. Take one capsule      aspirin delayed-release 325 mg tablet Take 1 Tablet by mouth two (2) times a day for 30 days. 60 Tablet 0    famotidine (PEPCID) 20 mg tablet Take 1 Tablet by mouth two (2) times a day for 30 days. 60 Tablet 0    erythromycin (ILOTYCIN) ophthalmic ointment One drop to left eye three times a day. Indications: corneal abrasion 1 g 0    diclofenac EC (VOLTAREN) 75 mg EC tablet TAKE 1 TABLET BY MOUTH TWICE A DAY 60 Tablet 0    acetaminophen (TYLENOL) 650 mg TbER Take 1 Tablet by mouth every eight (8) hours. take one for mild to moderate pain 3/10-6/10      cyanocobalamin 1,000 mcg tablet Take 1 Tablet by mouth daily. TURMERIC PO Take  by mouth Three (3) times a week. multivitamin (ONE A DAY) tablet Take 1 Tablet by mouth daily. metFORMIN ER (GLUCOPHAGE XR) 500 mg tablet Take 1 Tablet by mouth daily (with dinner). 90 Tablet 1     No current facility-administered medications on file prior to visit. ROS:  General: denies agitation, major chest pain, unexpected weakness  Hip pain is managed with nothing. Skin: healing wound is without issue. Strength: appropriate weakness of involved extremity is resolving since surgery      Physical Examination:    Blood pressure 112/79, pulse 94, temperature 97.7 °F (36.5 °C), temperature source Tympanic, resp. rate 18, height 5' 3\" (1.6 m), weight 249 lb (112.9 kg), last menstrual period 02/07/2011, SpO2 96 %. Skin: no significant drainage, no wound dehiscence. Sensation intact to light touch at level of wound and distally.  Lateral femoral cutaneous nerve function is intact. Strength is 4/5 with hip flexion and abduction  Leg lengths are clinically equal  Distal swelling is noted, but appropriate for postoperative course  Distal capillary refill less than 2 seconds      Imaging:    Postoperative xrays are reviewed, they indicate a right total hip arthroplasty in excellent position without evidence of loosening or failure. Leg lengths are equal through the hip.       Assessment: Status post right total hip arthroplasty    Plan:  Patient will continue physical therapy, with the goal of outpatient therapy and then home exercise program as soon as is possible  Wound care and dental prophylaxis instructions were reviewed  Continue to weight bear as tolerated without restriction, except to keep to the positions of comfort  Deep Venous Thrombosis Prophylaxis: aspirin    Follow up will be 4 weeks,  right hip xrays on follow up

## 2023-04-18 NOTE — PROGRESS NOTES
Identified pt with two pt identifiers (name and ). Reviewed chart in preparation for visit and have obtained necessary documentation. Aggie Stein is a 48 y.o. female  Chief Complaint   Patient presents with    Surgical Follow-up     Rt Hip     Visit Vitals  /79 (BP 1 Location: Right upper arm, BP Patient Position: Sitting, BP Cuff Size: Large adult)   Pulse 94   Temp 97.7 °F (36.5 °C) (Tympanic)   Resp 18   Ht 5' 3\" (1.6 m)   Wt 249 lb (112.9 kg)   LMP 2011   SpO2 96%   BMI 44.11 kg/m²     1. Have you been to the ER, urgent care clinic since your last visit? Hospitalized since your last visit? No    2. Have you seen or consulted any other health care providers outside of the [address] [place] [address] since your last visit? Include any pap smears or colon screening.  No

## 2023-04-28 RX ORDER — DICLOFENAC SODIUM 75 MG/1
TABLET, DELAYED RELEASE ORAL
Qty: 60 TABLET | Refills: 0 | Status: SHIPPED | OUTPATIENT
Start: 2023-04-28

## 2023-05-12 DIAGNOSIS — Z47.1 AFTERCARE FOLLOWING RIGHT HIP JOINT REPLACEMENT SURGERY: Primary | ICD-10-CM

## 2023-05-12 DIAGNOSIS — Z96.641 AFTERCARE FOLLOWING RIGHT HIP JOINT REPLACEMENT SURGERY: Primary | ICD-10-CM

## 2023-05-18 DIAGNOSIS — M25.552 PAIN OF LEFT HIP: Primary | ICD-10-CM

## 2023-05-19 RX ORDER — HYDROCODONE BITARTRATE AND ACETAMINOPHEN 5; 325 MG/1; MG/1
1 TABLET ORAL EVERY 6 HOURS PRN
Qty: 28 TABLET | Refills: 0 | Status: SHIPPED | OUTPATIENT
Start: 2023-05-19 | End: 2023-05-26

## 2023-05-19 RX ORDER — DICLOFENAC SODIUM 75 MG/1
75 TABLET, DELAYED RELEASE ORAL 2 TIMES DAILY
Qty: 60 TABLET | Refills: 0 | Status: SHIPPED | OUTPATIENT
Start: 2023-05-19

## 2023-06-23 ENCOUNTER — TELEPHONE (OUTPATIENT)
Age: 53
End: 2023-06-23

## 2023-06-23 DIAGNOSIS — B37.9 ANTIBIOTIC-INDUCED YEAST INFECTION: Primary | ICD-10-CM

## 2023-06-23 DIAGNOSIS — T36.95XA ANTIBIOTIC-INDUCED YEAST INFECTION: Primary | ICD-10-CM

## 2023-06-23 RX ORDER — FLUCONAZOLE 150 MG/1
150 TABLET ORAL ONCE
Qty: 2 TABLET | Refills: 0 | Status: SHIPPED | OUTPATIENT
Start: 2023-06-23 | End: 2023-06-23

## 2023-06-23 NOTE — TELEPHONE ENCOUNTER
Orders Placed This Encounter    fluconazole (DIFLUCAN) 150 MG tablet     Sig: Take 1 tablet by mouth once for 1 dose May repeat in 3-4 days if no improvement in symptoms     Dispense:  2 tablet     Refill:  0

## 2023-06-23 NOTE — TELEPHONE ENCOUNTER
Patient s/p amoxicillin rx from her dentist.  C/o external vaginal itching x 2 days. Pt has not tried any OTC remedies.

## 2023-06-23 NOTE — TELEPHONE ENCOUNTER
Patient wants to get something called in for a yeast infection.   Please give her a call @ [phone]

## 2023-06-24 ENCOUNTER — HOSPITAL ENCOUNTER (EMERGENCY)
Facility: HOSPITAL | Age: 53
Discharge: LEFT BEFORE DISCHARGE | End: 2023-06-24

## 2023-06-24 VITALS
RESPIRATION RATE: 16 BRPM | DIASTOLIC BLOOD PRESSURE: 84 MMHG | HEART RATE: 94 BPM | SYSTOLIC BLOOD PRESSURE: 126 MMHG | WEIGHT: 265 LBS | HEIGHT: 63 IN | OXYGEN SATURATION: 98 % | BODY MASS INDEX: 46.95 KG/M2 | TEMPERATURE: 98 F

## 2023-06-24 ASSESSMENT — PAIN SCALES - GENERAL: PAINLEVEL_OUTOF10: 7

## 2023-06-24 ASSESSMENT — PAIN - FUNCTIONAL ASSESSMENT: PAIN_FUNCTIONAL_ASSESSMENT: 0-10

## 2023-07-25 ENCOUNTER — TELEPHONE (OUTPATIENT)
Age: 53
End: 2023-07-25

## 2023-07-25 DIAGNOSIS — M25.552 PAIN OF LEFT HIP: ICD-10-CM

## 2023-07-25 RX ORDER — DICLOFENAC SODIUM 75 MG/1
75 TABLET, DELAYED RELEASE ORAL 2 TIMES DAILY
Qty: 60 TABLET | Refills: 0 | Status: SHIPPED | OUTPATIENT
Start: 2023-07-25

## 2023-07-25 NOTE — TELEPHONE ENCOUNTER
Patient is requesting a refill on her diclofenac prescription and to have it sent to her preferred pharmacy of [address] [place] [address], [address] [phone] - F [phone]

## 2023-08-22 DIAGNOSIS — M25.552 PAIN OF LEFT HIP: ICD-10-CM

## 2023-08-22 RX ORDER — DICLOFENAC SODIUM 75 MG/1
TABLET, DELAYED RELEASE ORAL
Qty: 60 TABLET | Refills: 0 | Status: SHIPPED | OUTPATIENT
Start: 2023-08-22

## 2023-09-05 ENCOUNTER — OFFICE VISIT (OUTPATIENT)
Age: 53
End: 2023-09-05
Payer: COMMERCIAL

## 2023-09-05 ENCOUNTER — TELEPHONE (OUTPATIENT)
Age: 53
End: 2023-09-05

## 2023-09-05 VITALS
TEMPERATURE: 97.9 F | DIASTOLIC BLOOD PRESSURE: 91 MMHG | RESPIRATION RATE: 17 BRPM | SYSTOLIC BLOOD PRESSURE: 145 MMHG | OXYGEN SATURATION: 95 % | HEART RATE: 95 BPM | HEIGHT: 63 IN | BODY MASS INDEX: 49.22 KG/M2 | WEIGHT: 277.8 LBS

## 2023-09-05 DIAGNOSIS — Z47.1 AFTERCARE FOLLOWING RIGHT HIP JOINT REPLACEMENT SURGERY: Primary | ICD-10-CM

## 2023-09-05 DIAGNOSIS — Z96.641 AFTERCARE FOLLOWING RIGHT HIP JOINT REPLACEMENT SURGERY: Primary | ICD-10-CM

## 2023-09-05 DIAGNOSIS — M16.0 BILATERAL PRIMARY OSTEOARTHRITIS OF HIP: ICD-10-CM

## 2023-09-05 PROCEDURE — 3017F COLORECTAL CA SCREEN DOC REV: CPT | Performed by: ORTHOPAEDIC SURGERY

## 2023-09-05 PROCEDURE — 4004F PT TOBACCO SCREEN RCVD TLK: CPT | Performed by: ORTHOPAEDIC SURGERY

## 2023-09-05 PROCEDURE — G8427 DOCREV CUR MEDS BY ELIG CLIN: HCPCS | Performed by: ORTHOPAEDIC SURGERY

## 2023-09-05 PROCEDURE — 99213 OFFICE O/P EST LOW 20 MIN: CPT | Performed by: ORTHOPAEDIC SURGERY

## 2023-09-05 PROCEDURE — G8417 CALC BMI ABV UP PARAM F/U: HCPCS | Performed by: ORTHOPAEDIC SURGERY

## 2023-09-05 ASSESSMENT — PATIENT HEALTH QUESTIONNAIRE - PHQ9
SUM OF ALL RESPONSES TO PHQ9 QUESTIONS 1 & 2: 0
1. LITTLE INTEREST OR PLEASURE IN DOING THINGS: 0
SUM OF ALL RESPONSES TO PHQ QUESTIONS 1-9: 0
SUM OF ALL RESPONSES TO PHQ QUESTIONS 1-9: 0
2. FEELING DOWN, DEPRESSED OR HOPELESS: 0
SUM OF ALL RESPONSES TO PHQ QUESTIONS 1-9: 0
SUM OF ALL RESPONSES TO PHQ QUESTIONS 1-9: 0

## 2023-09-05 NOTE — TELEPHONE ENCOUNTER
Contacted patient to schedule surgery. Scheduled for 12/11/23. Advised patient that clearances from Suze Alanis NP would be required, and would need to be received no less than 2 business days before surgery. Patient advised to contact the office(s) to make pre op appts as soon as possible. Patient verbalized understanding and was encouraged to contact our office with any questions or concerns regarding upcoming surgery or required clearances. Clearance letters faxed to [phone].  Confirmation was received.           ----- Message from Lai Coates DO sent at 9/5/2023  3:07 PM EDT -----  Diagnosis: Unilateral Primary Osteoarthritis, left hip (M16.2)   Procedure: Left total hip arthroplasty  CPT: 78858  Operative minutes: 90  Inpatient  Location: [facility] Main OR  PAT: Yes  Class: Yes  Special Equipment: C arm (12 inch), HANA table, Direct Anterior total Hip, Oh Accolade II  Staffing: Assistant/retractor blackburn  Anesthesia: Spinal  Surgical index: 2

## 2023-09-05 NOTE — PROGRESS NOTES
9/5/2023    Chief Complaint: Left hip pain    Assessment: Unilateral primary osteoarthritis of the left hip  (M16.12)     Plan: This patient and I did have a long discussion regarding the treatment options. Nonoperative management was discussed at length, continued pain control, physical therapy, injections, ambulatory aids, and weight loss. I did speak with the patient specifically that there are always some nonoperative options, and that surgery would be elective on their part. We did discuss the risks of surgery which include but are not limited to infection, nerve or blood vessel damage, femoral, lateral femoral cutaneous nerve injury, sciatic nerve injury, failure of fixation, failure of any possible implant, need for reoperation, postoperative pain and swelling, intra-or postoperative fracture, postoperative dislocation, leg length inequality, need for reoperation, implant failure, death, disability, organ dysfunction, wound healing issues, DVT, PE, and the need for further procedures. The patient did freely state their understanding and satisfaction with our discussion. We will proceed with a total hip arthroplasty after 15 lbs weight loss and medical clearances. The patient was counseled at length about the risks of sole Covid-19 during their perioperative period and any recovery window from their procedure. The patient was made aware that sole Covid-19  may worsen their prognosis for recovering from their procedure and lend to a higher morbidity and/or mortality risk. All material risks, benefits, and reasonable alternatives including postponing the procedure were discussed. The patient does  wish to proceed with the procedure at this time. HPI: This is a 48 y. o.female who complains of left hip pain which is activity dependent. The patient has had activity dependent pain for years.     The patient has tried activity modification, she has been in physical therapy, injections have

## 2023-09-20 DIAGNOSIS — M25.552 PAIN OF LEFT HIP: ICD-10-CM

## 2023-09-20 RX ORDER — DICLOFENAC SODIUM 75 MG/1
TABLET, DELAYED RELEASE ORAL
Qty: 60 TABLET | Refills: 0 | Status: SHIPPED | OUTPATIENT
Start: 2023-09-20

## 2023-11-13 ENCOUNTER — PREP FOR PROCEDURE (OUTPATIENT)
Age: 53
End: 2023-11-13

## 2023-11-13 ENCOUNTER — TELEPHONE (OUTPATIENT)
Age: 53
End: 2023-11-13

## 2023-11-13 DIAGNOSIS — M16.12 PRIMARY OSTEOARTHRITIS OF LEFT HIP: ICD-10-CM

## 2023-11-13 DIAGNOSIS — M25.552 PAIN OF LEFT HIP: ICD-10-CM

## 2023-11-13 RX ORDER — DICLOFENAC SODIUM 75 MG/1
75 TABLET, DELAYED RELEASE ORAL 2 TIMES DAILY
Qty: 60 TABLET | Refills: 0 | Status: SHIPPED | OUTPATIENT
Start: 2023-11-13 | End: 2023-12-13

## 2023-11-13 NOTE — TELEPHONE ENCOUNTER
Patient is requesting a refill on the prescribed diclofenac with refills and to have it sent to her preferred pharmacy of [address] [place] [address], [address] [phone] - F [phone] [[id number]]

## 2023-11-15 ENCOUNTER — TELEPHONE (OUTPATIENT)
Age: 53
End: 2023-11-15

## 2023-11-15 NOTE — TELEPHONE ENCOUNTER
Awais Marshall Jason [facility] Desk Staff  Subject: Appointment Request     Reason for Call: Established Patient Appointment needed: Urgent (Patient   Request) No Script     QUESTIONS     Reason for appointment request? No appointments available during search       Additional Information for Provider?  Patient has discovered a lump in her   right breast. She needs to see the docotr as soon as possible.   ---------------------------------------------------------------------------   --------------   [address]   [phone]; OK to leave message on voicemail   -----------------------------------------------------------------------

## 2023-11-16 ENCOUNTER — OFFICE VISIT (OUTPATIENT)
Age: 53
End: 2023-11-16
Payer: COMMERCIAL

## 2023-11-16 VITALS
DIASTOLIC BLOOD PRESSURE: 74 MMHG | SYSTOLIC BLOOD PRESSURE: 124 MMHG | OXYGEN SATURATION: 94 % | HEART RATE: 90 BPM | TEMPERATURE: 98 F | BODY MASS INDEX: 48.08 KG/M2 | WEIGHT: 271.4 LBS | RESPIRATION RATE: 18 BRPM

## 2023-11-16 DIAGNOSIS — N63.21 MASS OF UPPER OUTER QUADRANT OF LEFT BREAST: Primary | ICD-10-CM

## 2023-11-16 DIAGNOSIS — N63.11 MASS OF UPPER OUTER QUADRANT OF RIGHT BREAST: ICD-10-CM

## 2023-11-16 PROBLEM — R00.0 SINUS TACHYCARDIA: Status: RESOLVED | Noted: 2023-04-01 | Resolved: 2023-11-16

## 2023-11-16 PROCEDURE — 99213 OFFICE O/P EST LOW 20 MIN: CPT | Performed by: NURSE PRACTITIONER

## 2023-11-16 SDOH — ECONOMIC STABILITY: FOOD INSECURITY: WITHIN THE PAST 12 MONTHS, YOU WORRIED THAT YOUR FOOD WOULD RUN OUT BEFORE YOU GOT MONEY TO BUY MORE.: NEVER TRUE

## 2023-11-16 SDOH — ECONOMIC STABILITY: FOOD INSECURITY: WITHIN THE PAST 12 MONTHS, THE FOOD YOU BOUGHT JUST DIDN'T LAST AND YOU DIDN'T HAVE MONEY TO GET MORE.: NEVER TRUE

## 2023-11-16 SDOH — ECONOMIC STABILITY: HOUSING INSECURITY
IN THE LAST 12 MONTHS, WAS THERE A TIME WHEN YOU DID NOT HAVE A STEADY PLACE TO SLEEP OR SLEPT IN A SHELTER (INCLUDING NOW)?: NO

## 2023-11-16 SDOH — ECONOMIC STABILITY: INCOME INSECURITY: HOW HARD IS IT FOR YOU TO PAY FOR THE VERY BASICS LIKE FOOD, HOUSING, MEDICAL CARE, AND HEATING?: NOT VERY HARD

## 2023-11-16 ASSESSMENT — PATIENT HEALTH QUESTIONNAIRE - PHQ9
SUM OF ALL RESPONSES TO PHQ9 QUESTIONS 1 & 2: 1
SUM OF ALL RESPONSES TO PHQ QUESTIONS 1-9: 1
1. LITTLE INTEREST OR PLEASURE IN DOING THINGS: 0
SUM OF ALL RESPONSES TO PHQ QUESTIONS 1-9: 1
2. FEELING DOWN, DEPRESSED OR HOPELESS: 1

## 2023-11-16 NOTE — PROGRESS NOTES
Bryant Olszewski (:  1970) is a 48 y.o. female,Established patient, here for evaluation of the following chief complaint(s):  Breast Mass         ASSESSMENT/PLAN:  1. Mass of upper outer quadrant of left breast  -     SHELLI RON DIGITAL DIAGNOSTIC BILATERAL; Future  -     US BREAST COMPLETE LEFT; Future  -     US BREAST COMPLETE RIGHT; Future  2. Mass of upper outer quadrant of right breast  -     SHELLI RON DIGITAL DIAGNOSTIC BILATERAL; Future  -     US BREAST COMPLETE LEFT; Future  -     US BREAST COMPLETE RIGHT; Future    Scheduled for dx mammo and US on 23 at [facility]. Patient aware of appt. [facility] to see if she can get a sooner appt      Subjective   SUBJECTIVE/OBJECTIVE:  HPI  patient comes in today for right breast mass. She noticed some discomfort in right breast, performed BSE and notice a lump. No skin changes, no nipple discharge  She has not had mammogram since   I had ordered a mammogram previously but patient did not get done as she was having hip issues and undergoing hip surgeries.   She is scheduled to to have right hip arthroplasty 23    Allergies   Allergen Reactions    Other Swelling     Allergy to jicama root       Past Medical History:   Diagnosis Date    Arthritis     hips    Spinal stenosis 2016       Past Surgical History:   Procedure Laterality Date     SECTION      x2 -,     HEENT      jaw surgery    HERNIA REPAIR  2021    Dr. Wells Guardian (CERVIX NOT REMOVED)      TUBAL LIGATION         Social History     Socioeconomic History    Marital status: Single     Spouse name: Not on file    Number of children: Not on file    Years of education: Not on file    Highest education level: Not on file   Occupational History    Not on file   Tobacco Use    Smoking status: Never    Smokeless tobacco: Never   Vaping Use    Vaping Use: Never used   Substance and Sexual Activity    Alcohol use: Not Currently

## 2023-11-16 NOTE — PROGRESS NOTES
Chief Complaint   Patient presents with    Breast Mass       1. \"Have you been to the ER, urgent care clinic since your last visit? Hospitalized since your last visit? \" NO    2. \"Have you seen or consulted any other health care providers outside of the [address] [place] [address] since your last visit? \" NO     3. For patients aged 43-73: Has the patient had a colonoscopy / FIT/ Cologuard? No      If the patient is female:    4. For patients aged 43-66: Has the patient had a mammogram within the past 2 years? NO      5. For patients aged 21-65: Has the patient had a pap smear? NO      The patient, Elisa Mcdonald, identity was verified by name and .       Health Maintenance Due   Topic Date Due    Hepatitis B vaccine (1 of 3 - 3-dose series) Never done    HIV screen  Never done    Hepatitis C screen  Never done    Cervical cancer screen  Never done    Colorectal Cancer Screen  Never done    Breast cancer screen  2022    Shingles vaccine (2 of 2) 2022    Flu vaccine (1) 2023    COVID-19 Vaccine ( season) 2023

## 2023-11-22 ENCOUNTER — CLINICAL DOCUMENTATION (OUTPATIENT)
Age: 53
End: 2023-11-22

## 2023-11-22 NOTE — PROGRESS NOTES
Surgery clearance letter form faxed to Roshan Ontiveros [facility] specialist at [facility]

## 2023-11-28 ENCOUNTER — HOSPITAL ENCOUNTER (OUTPATIENT)
Facility: HOSPITAL | Age: 53
Discharge: HOME | End: 2023-12-01
Payer: COMMERCIAL

## 2023-11-28 VITALS
TEMPERATURE: 97.7 F | DIASTOLIC BLOOD PRESSURE: 75 MMHG | WEIGHT: 271.39 LBS | RESPIRATION RATE: 20 BRPM | OXYGEN SATURATION: 95 % | BODY MASS INDEX: 48.09 KG/M2 | HEIGHT: 63 IN | SYSTOLIC BLOOD PRESSURE: 112 MMHG | HEART RATE: 99 BPM

## 2023-11-28 LAB
ABO + RH BLD: NORMAL
ALBUMIN SERPL-MCNC: 3.7 G/DL (ref 3.5–5)
ALBUMIN/GLOB SERPL: 0.9 (ref 1.1–2.2)
ALP SERPL-CCNC: 101 U/L (ref 45–117)
ALT SERPL-CCNC: 62 U/L (ref 12–78)
ANION GAP SERPL CALC-SCNC: 9 MMOL/L (ref 5–15)
APPEARANCE UR: CLEAR
AST SERPL-CCNC: 39 U/L (ref 15–37)
BACTERIA URNS QL MICRO: ABNORMAL /HPF
BILIRUB SERPL-MCNC: 0.5 MG/DL (ref 0.2–1)
BILIRUB UR QL: NEGATIVE
BLOOD GROUP ANTIBODIES SERPL: NORMAL
BUN SERPL-MCNC: 15 MG/DL (ref 6–20)
BUN/CREAT SERPL: 16 (ref 12–20)
CALCIUM SERPL-MCNC: 9.4 MG/DL (ref 8.5–10.1)
CHLORIDE SERPL-SCNC: 106 MMOL/L (ref 97–108)
CO2 SERPL-SCNC: 23 MMOL/L (ref 21–32)
COLOR UR: ABNORMAL
CREAT SERPL-MCNC: 0.92 MG/DL (ref 0.55–1.02)
EPITH CASTS URNS QL MICRO: ABNORMAL /LPF
ERYTHROCYTE [DISTWIDTH] IN BLOOD BY AUTOMATED COUNT: 12.3 % (ref 11.5–14.5)
EST. AVERAGE GLUCOSE BLD GHB EST-MCNC: 120 MG/DL
GLOBULIN SER CALC-MCNC: 4 G/DL (ref 2–4)
GLUCOSE SERPL-MCNC: 155 MG/DL (ref 65–100)
GLUCOSE UR STRIP.AUTO-MCNC: NEGATIVE MG/DL
HBA1C MFR BLD: 5.8 % (ref 4–5.6)
HCT VFR BLD AUTO: 44.1 % (ref 35–47)
HGB BLD-MCNC: 15 G/DL (ref 11.5–16)
HGB UR QL STRIP: ABNORMAL
HYALINE CASTS URNS QL MICRO: ABNORMAL /LPF (ref 0–2)
INR PPP: 1 (ref 0.9–1.1)
KETONES UR QL STRIP.AUTO: NEGATIVE MG/DL
LEUKOCYTE ESTERASE UR QL STRIP.AUTO: NEGATIVE
MCH RBC QN AUTO: 33 PG (ref 26–34)
MCHC RBC AUTO-ENTMCNC: 34 G/DL (ref 30–36.5)
MCV RBC AUTO: 96.9 FL (ref 80–99)
NITRITE UR QL STRIP.AUTO: NEGATIVE
NRBC # BLD: 0 K/UL (ref 0–0.01)
NRBC BLD-RTO: 0 PER 100 WBC
PH UR STRIP: 5 (ref 5–8)
PLATELET # BLD AUTO: 290 K/UL (ref 150–400)
PMV BLD AUTO: 10.9 FL (ref 8.9–12.9)
POTASSIUM SERPL-SCNC: 3.9 MMOL/L (ref 3.5–5.1)
PROT SERPL-MCNC: 7.7 G/DL (ref 6.4–8.2)
PROT UR STRIP-MCNC: NEGATIVE MG/DL
PROTHROMBIN TIME: 10.3 SEC (ref 9–11.1)
RBC # BLD AUTO: 4.55 M/UL (ref 3.8–5.2)
RBC #/AREA URNS HPF: ABNORMAL /HPF (ref 0–5)
SODIUM SERPL-SCNC: 138 MMOL/L (ref 136–145)
SP GR UR REFRACTOMETRY: 1.02
SPECIMEN EXP DATE BLD: NORMAL
URINE CULTURE IF INDICATED: ABNORMAL
UROBILINOGEN UR QL STRIP.AUTO: 0.2 EU/DL (ref 0.2–1)
WBC # BLD AUTO: 11.2 K/UL (ref 3.6–11)
WBC URNS QL MICRO: ABNORMAL /HPF (ref 0–4)

## 2023-11-28 PROCEDURE — 86850 RBC ANTIBODY SCREEN: CPT

## 2023-11-28 PROCEDURE — 83036 HEMOGLOBIN GLYCOSYLATED A1C: CPT

## 2023-11-28 PROCEDURE — 86900 BLOOD TYPING SEROLOGIC ABO: CPT

## 2023-11-28 PROCEDURE — 86901 BLOOD TYPING SEROLOGIC RH(D): CPT

## 2023-11-28 PROCEDURE — 81001 URINALYSIS AUTO W/SCOPE: CPT

## 2023-11-28 PROCEDURE — 36415 COLL VENOUS BLD VENIPUNCTURE: CPT

## 2023-11-28 PROCEDURE — 85027 COMPLETE CBC AUTOMATED: CPT

## 2023-11-28 PROCEDURE — 85610 PROTHROMBIN TIME: CPT

## 2023-11-28 PROCEDURE — 80053 COMPREHEN METABOLIC PANEL: CPT

## 2023-11-28 RX ORDER — SODIUM CHLORIDE, SODIUM LACTATE, POTASSIUM CHLORIDE, CALCIUM CHLORIDE 600; 310; 30; 20 MG/100ML; MG/100ML; MG/100ML; MG/100ML
INJECTION, SOLUTION INTRAVENOUS CONTINUOUS
OUTPATIENT
Start: 2023-12-11

## 2023-11-28 RX ORDER — SENNOSIDES 8.6 MG
1300 CAPSULE ORAL EVERY 8 HOURS PRN
COMMUNITY

## 2023-11-28 NOTE — PERIOP NOTE
The [address] \"Your Path to a More Active Life\" orthopedic total knee or total hip educational video and the [address] [place] [address] patient handbook provided & reviewed during the patients pre-admission testing (PAT) appointment in March of 2023. An opportunity for questions was provided, patient verbalized understanding during PAT appointment on 11/28/23.

## 2023-11-28 NOTE — PERIOP NOTE

## 2023-11-28 NOTE — PERIOP NOTE

## 2023-11-29 LAB
BACTERIA SPEC CULT: NORMAL
BACTERIA SPEC CULT: NORMAL
SERVICE CMNT-IMP: NORMAL

## 2023-12-04 ENCOUNTER — TELEPHONE (OUTPATIENT)
Age: 53
End: 2023-12-04

## 2023-12-04 NOTE — PERIOP NOTE
Spoke to Danny in Dr. Paiz Hidden office ad he is aware of BMI 48. Will continue with planned procedure at this time.

## 2023-12-04 NOTE — TELEPHONE ENCOUNTER
Alix Quispe put a note on patient's appt on 12/6/23 indicating \"she said you already cleared her for this surgery she does not need this appt\"    I only saw her for her breast mass concern at her last appt. I did not clear her for surgery. I told her she had to keep the appt on 12/6/23 for the preop.   Please advise patient to keep appt on 12/6/23

## 2023-12-06 ENCOUNTER — OFFICE VISIT (OUTPATIENT)
Age: 53
End: 2023-12-06
Payer: COMMERCIAL

## 2023-12-06 VITALS
BODY MASS INDEX: 47.97 KG/M2 | OXYGEN SATURATION: 94 % | DIASTOLIC BLOOD PRESSURE: 78 MMHG | TEMPERATURE: 98 F | WEIGHT: 270.8 LBS | RESPIRATION RATE: 18 BRPM | SYSTOLIC BLOOD PRESSURE: 120 MMHG | HEART RATE: 83 BPM

## 2023-12-06 DIAGNOSIS — Z01.818 PREOPERATIVE GENERAL PHYSICAL EXAMINATION: Primary | ICD-10-CM

## 2023-12-06 DIAGNOSIS — N63.11 MASS OF UPPER OUTER QUADRANT OF RIGHT BREAST: ICD-10-CM

## 2023-12-06 DIAGNOSIS — N63.21 MASS OF UPPER OUTER QUADRANT OF LEFT BREAST: ICD-10-CM

## 2023-12-06 DIAGNOSIS — M16.12 PRIMARY OSTEOARTHRITIS OF LEFT HIP: ICD-10-CM

## 2023-12-06 DIAGNOSIS — Z12.11 COLON CANCER SCREENING: ICD-10-CM

## 2023-12-06 PROCEDURE — 99214 OFFICE O/P EST MOD 30 MIN: CPT | Performed by: NURSE PRACTITIONER

## 2023-12-06 SDOH — ECONOMIC STABILITY: INCOME INSECURITY: HOW HARD IS IT FOR YOU TO PAY FOR THE VERY BASICS LIKE FOOD, HOUSING, MEDICAL CARE, AND HEATING?: NOT VERY HARD

## 2023-12-06 SDOH — ECONOMIC STABILITY: FOOD INSECURITY: WITHIN THE PAST 12 MONTHS, YOU WORRIED THAT YOUR FOOD WOULD RUN OUT BEFORE YOU GOT MONEY TO BUY MORE.: NEVER TRUE

## 2023-12-06 SDOH — ECONOMIC STABILITY: FOOD INSECURITY: WITHIN THE PAST 12 MONTHS, THE FOOD YOU BOUGHT JUST DIDN'T LAST AND YOU DIDN'T HAVE MONEY TO GET MORE.: NEVER TRUE

## 2023-12-06 ASSESSMENT — ENCOUNTER SYMPTOMS
DIARRHEA: 0
VOMITING: 0
SHORTNESS OF BREATH: 0
CONSTIPATION: 0
ABDOMINAL PAIN: 0
COUGH: 0
NAUSEA: 0
BLOOD IN STOOL: 0

## 2023-12-06 ASSESSMENT — PATIENT HEALTH QUESTIONNAIRE - PHQ9
SUM OF ALL RESPONSES TO PHQ QUESTIONS 1-9: 0
1. LITTLE INTEREST OR PLEASURE IN DOING THINGS: 0
2. FEELING DOWN, DEPRESSED OR HOPELESS: 0
SUM OF ALL RESPONSES TO PHQ QUESTIONS 1-9: 0
SUM OF ALL RESPONSES TO PHQ9 QUESTIONS 1 & 2: 0

## 2023-12-06 NOTE — PROGRESS NOTES
Celio Mehta (:  1970) is a 48 y.o. female,Established patient, here for evaluation of the following chief complaint(s):  Surgery Clearance         ASSESSMENT/PLAN:  1. Preoperative general physical examination - patient is medically cleared to proceed with left CURTIS on 23  2. Primary osteoarthritis of left hip - per ortho  3. Colon cancer screening  -     [facility] - Soraya Bess MD, Gastroenterology, [facility])  4. Mass of upper outer quadrant of left breast - scheduled for mammo and US on 23  5. Mass of upper outer quadrant of right breast- scheduled for mammo and US on 23      No follow-ups on file. Subjective   SUBJECTIVE/OBJECTIVE:  HPI  patient comes in today for preop exam.  Scheduled to have left CURTIS on 23 for hip OA  [place] 3/30/23 - had right total hip arthroplasty with Dr. Clem Hyman for hip OA  Highest weight 281#, she is down to 270# today, prior to surgery in 2023, she was down to 254#  Only eats once daily. Tries to split it into 2 meals. Drinks water all day  May drink cup of coffee in morning. Taking metformin. A1c 5.8% on preop labs.   Hoping after surgery, she will be able to increase exercise again  EKG from 2023 normal  Reviewed labs from preop - no action needed,    Allergies   Allergen Reactions    Other Swelling     Allergy to jicama root       Past Medical History:   Diagnosis Date    Arthritis     hips    Insulin resistance     Spinal stenosis 2016       Past Surgical History:   Procedure Laterality Date     SECTION      x2 -,     HEENT      jaw surgery    HERNIA REPAIR  2021    Dr. Abhishek Tomlinson Right 2023    PARTIAL HYSTERECTOMY (CERVIX NOT REMOVED)  2011    TUBAL LIGATION         Social History     Socioeconomic History    Marital status: Single     Spouse name: Not on file    Number of children: Not on file    Years of education: Not on file    Highest education level: Not on file

## 2023-12-06 NOTE — PROGRESS NOTES
Chief Complaint   Patient presents with    Surgery Clearance       1. \"Have you been to the ER, urgent care clinic since your last visit? Hospitalized since your last visit? \" No    2. \"Have you seen or consulted any other health care providers outside of the [address] [place] [address] since your last visit? \" No     3. For patients aged 43-73: Has the patient had a colonoscopy / FIT/ Cologuard? No    If the patient is female:    4. For patients aged 43-66: Has the patient had a mammogram within the past 2 years? No appt setup for 24.      5. For patients aged 21-65: Has the patient had a pap smear? No       The patient, Yayo Pink, identity was verified by name and .     Health Maintenance Due   Topic Date Due    Hepatitis B vaccine (1 of 3 - 3-dose series) Never done    Pneumococcal 0-64 years Vaccine (1 - PCV) Never done    HIV screen  Never done    Hepatitis C screen  Never done    Cervical cancer screen  Never done    Colorectal Cancer Screen  Never done    Breast cancer screen  2022    Shingles vaccine (2 of 2) 2022    COVID-19 Vaccine ( season) 2023

## 2023-12-08 ENCOUNTER — APPOINTMENT (OUTPATIENT)
Facility: HOSPITAL | Age: 53
End: 2023-12-08
Payer: COMMERCIAL

## 2023-12-08 ENCOUNTER — HOSPITAL ENCOUNTER (OUTPATIENT)
Facility: HOSPITAL | Age: 53
End: 2023-12-08
Payer: COMMERCIAL

## 2023-12-08 ENCOUNTER — HOSPITAL ENCOUNTER (OUTPATIENT)
Facility: HOSPITAL | Age: 53
Discharge: HOME | End: 2023-12-08
Payer: COMMERCIAL

## 2023-12-08 VITALS — WEIGHT: 270 LBS | HEIGHT: 64 IN | BODY MASS INDEX: 46.1 KG/M2

## 2023-12-08 DIAGNOSIS — N63.11 MASS OF UPPER OUTER QUADRANT OF RIGHT BREAST: ICD-10-CM

## 2023-12-08 DIAGNOSIS — N63.21 MASS OF UPPER OUTER QUADRANT OF LEFT BREAST: ICD-10-CM

## 2023-12-08 PROCEDURE — G0279 TOMOSYNTHESIS, MAMMO: HCPCS

## 2023-12-08 PROCEDURE — 76642 ULTRASOUND BREAST LIMITED: CPT

## 2023-12-08 NOTE — H&P
2023    Chief Complaint: Left hip pain    HPI: This is a 48 y.o. patient who complains of left hip pain which is activity dependent. The patient has failed nonoperative management of this end stage arthritis of the left hip and would like to proceed with a total hip arthroplasty. Patient has been medically and specialty cleared. The patient denies any numbness, weakness, tingling, fevers, chills, nausea, vomiting, fevers, chills, nausea, vomiting. Past Medical History:   Diagnosis Date    Arthritis     hips    Insulin resistance     Spinal stenosis        Past Surgical History:   Procedure Laterality Date     SECTION      x2 -,     HEENT      jaw surgery    HERNIA REPAIR  2021    Dr. Shivani Nuñez Right 2023    PARTIAL HYSTERECTOMY (CERVIX NOT REMOVED)      TUBAL LIGATION         No current facility-administered medications on file prior to encounter.      Current Outpatient Medications on File Prior to Encounter   Medication Sig Dispense Refill    diclofenac (VOLTAREN) 75 MG EC tablet Take 1 tablet by mouth 2 times daily 60 tablet 0    metFORMIN (GLUCOPHAGE-XR) 500 MG extended release tablet Take 1 tablet by mouth Daily with supper         Allergies   Allergen Reactions    Other Swelling     Allergy to jicama root       Family History   Problem Relation Age of Onset    Cancer Mother         larynx    Heart Attack Father     No Known Problems Sister     Breast Cancer Maternal Aunt         age unknown       Social History     Socioeconomic History    Marital status: Single   Tobacco Use    Smoking status: Some Days     Packs/day: 0.25     Years: 2.00     Additional pack years: 0.00     Total pack years: 0.50     Types: Cigarettes    Smokeless tobacco: Never   Vaping Use    Vaping Use: Never used   Substance and Sexual Activity    Alcohol use: Yes     Comment: \"socially, once every ~3 months\"    Drug use: Not Currently     Social Determinants of Health

## 2023-12-11 ENCOUNTER — APPOINTMENT (OUTPATIENT)
Facility: HOSPITAL | Age: 53
End: 2023-12-11
Attending: ORTHOPAEDIC SURGERY
Payer: COMMERCIAL

## 2023-12-11 ENCOUNTER — HOSPITAL ENCOUNTER (OUTPATIENT)
Facility: HOSPITAL | Age: 53
Discharge: HOME | End: 2023-12-11
Attending: ORTHOPAEDIC SURGERY | Admitting: ORTHOPAEDIC SURGERY
Payer: COMMERCIAL

## 2023-12-11 ENCOUNTER — ANESTHESIA (OUTPATIENT)
Facility: HOSPITAL | Age: 53
End: 2023-12-11
Payer: COMMERCIAL

## 2023-12-11 ENCOUNTER — ANESTHESIA EVENT (OUTPATIENT)
Facility: HOSPITAL | Age: 53
End: 2023-12-11
Payer: COMMERCIAL

## 2023-12-11 VITALS
HEIGHT: 63 IN | WEIGHT: 268.74 LBS | RESPIRATION RATE: 16 BRPM | BODY MASS INDEX: 47.62 KG/M2 | DIASTOLIC BLOOD PRESSURE: 81 MMHG | HEART RATE: 84 BPM | SYSTOLIC BLOOD PRESSURE: 116 MMHG | TEMPERATURE: 97.7 F | OXYGEN SATURATION: 97 %

## 2023-12-11 DIAGNOSIS — Z96.642 STATUS POST TOTAL REPLACEMENT OF LEFT HIP: Primary | ICD-10-CM

## 2023-12-11 PROBLEM — M16.12 UNILATERAL PRIMARY OSTEOARTHRITIS, LEFT HIP: Status: ACTIVE | Noted: 2023-12-11

## 2023-12-11 LAB
GLUCOSE BLD STRIP.AUTO-MCNC: 107 MG/DL (ref 65–117)
GLUCOSE BLD STRIP.AUTO-MCNC: 88 MG/DL (ref 65–117)
SERVICE CMNT-IMP: NORMAL
SERVICE CMNT-IMP: NORMAL

## 2023-12-11 PROCEDURE — 3600000015 HC SURGERY LEVEL 5 ADDTL 15MIN: Performed by: ORTHOPAEDIC SURGERY

## 2023-12-11 PROCEDURE — 6360000002 HC RX W HCPCS: Performed by: PHYSICIAN ASSISTANT

## 2023-12-11 PROCEDURE — 64447 NJX AA&/STRD FEMORAL NRV IMG: CPT | Performed by: ANESTHESIOLOGY

## 2023-12-11 PROCEDURE — 6360000002 HC RX W HCPCS: Performed by: ANESTHESIOLOGY

## 2023-12-11 PROCEDURE — 82962 GLUCOSE BLOOD TEST: CPT

## 2023-12-11 PROCEDURE — 6370000000 HC RX 637 (ALT 250 FOR IP): Performed by: ORTHOPAEDIC SURGERY

## 2023-12-11 PROCEDURE — 7100000000 HC PACU RECOVERY - FIRST 15 MIN: Performed by: ORTHOPAEDIC SURGERY

## 2023-12-11 PROCEDURE — 2580000003 HC RX 258: Performed by: PHYSICIAN ASSISTANT

## 2023-12-11 PROCEDURE — C1776 JOINT DEVICE (IMPLANTABLE): HCPCS | Performed by: ORTHOPAEDIC SURGERY

## 2023-12-11 PROCEDURE — 97161 PT EVAL LOW COMPLEX 20 MIN: CPT

## 2023-12-11 PROCEDURE — 3600000005 HC SURGERY LEVEL 5 BASE: Performed by: ORTHOPAEDIC SURGERY

## 2023-12-11 PROCEDURE — 3700000000 HC ANESTHESIA ATTENDED CARE: Performed by: ORTHOPAEDIC SURGERY

## 2023-12-11 PROCEDURE — 6360000002 HC RX W HCPCS: Performed by: ORTHOPAEDIC SURGERY

## 2023-12-11 PROCEDURE — 2709999900 HC NON-CHARGEABLE SUPPLY: Performed by: ORTHOPAEDIC SURGERY

## 2023-12-11 PROCEDURE — 6360000002 HC RX W HCPCS: Performed by: NURSE ANESTHETIST, CERTIFIED REGISTERED

## 2023-12-11 PROCEDURE — 3700000001 HC ADD 15 MINUTES (ANESTHESIA): Performed by: ORTHOPAEDIC SURGERY

## 2023-12-11 PROCEDURE — 97165 OT EVAL LOW COMPLEX 30 MIN: CPT

## 2023-12-11 PROCEDURE — 97535 SELF CARE MNGMENT TRAINING: CPT

## 2023-12-11 PROCEDURE — 97116 GAIT TRAINING THERAPY: CPT

## 2023-12-11 PROCEDURE — 2580000003 HC RX 258: Performed by: NURSE ANESTHETIST, CERTIFIED REGISTERED

## 2023-12-11 PROCEDURE — 7100000001 HC PACU RECOVERY - ADDTL 15 MIN: Performed by: ORTHOPAEDIC SURGERY

## 2023-12-11 PROCEDURE — 2500000003 HC RX 250 WO HCPCS: Performed by: NURSE ANESTHETIST, CERTIFIED REGISTERED

## 2023-12-11 PROCEDURE — 72170 X-RAY EXAM OF PELVIS: CPT

## 2023-12-11 PROCEDURE — 6370000000 HC RX 637 (ALT 250 FOR IP): Performed by: NURSE PRACTITIONER

## 2023-12-11 PROCEDURE — 2580000003 HC RX 258: Performed by: STUDENT IN AN ORGANIZED HEALTH CARE EDUCATION/TRAINING PROGRAM

## 2023-12-11 PROCEDURE — 6370000000 HC RX 637 (ALT 250 FOR IP): Performed by: PHYSICIAN ASSISTANT

## 2023-12-11 DEVICE — SHELL TRIDENT II CLUSTERHOLE HA ACET 54E: Type: IMPLANTABLE DEVICE | Site: HIP | Status: FUNCTIONAL

## 2023-12-11 DEVICE — SCREW BNE L35MM DIA65MM LO PROF HEX TRIDENT LL: Type: IMPLANTABLE DEVICE | Site: HIP | Status: FUNCTIONAL

## 2023-12-11 DEVICE — HEAD FEM DIA36MM +2.5MM OFFSET HIP BIOLOX DELT CERAMIC TAPR: Type: IMPLANTABLE DEVICE | Site: HIP | Status: FUNCTIONAL

## 2023-12-11 DEVICE — SCREW BNE L15MM DIA6.5MM LO PROF HEX TRIDENT LL: Type: IMPLANTABLE DEVICE | Site: HIP | Status: FUNCTIONAL

## 2023-12-11 DEVICE — STEM FEM SZ 4 L105MM NK L35MM 42MM OFFSET 127DEG HIP TI: Type: IMPLANTABLE DEVICE | Site: HIP | Status: FUNCTIONAL

## 2023-12-11 DEVICE — INSERT ACET E 0 DEG 36 MM HIP X3 TRIDENT: Type: IMPLANTABLE DEVICE | Site: HIP | Status: FUNCTIONAL

## 2023-12-11 DEVICE — COMPONENT TOT HIP CAPPED LNR POLYETH H2STRYKER] STRYKER CORP]: Type: IMPLANTABLE DEVICE | Site: HIP | Status: FUNCTIONAL

## 2023-12-11 RX ORDER — SODIUM CHLORIDE 0.9 % (FLUSH) 0.9 %
5-40 SYRINGE (ML) INJECTION PRN
Status: DISCONTINUED | OUTPATIENT
Start: 2023-12-11 | End: 2023-12-11 | Stop reason: HOSPADM

## 2023-12-11 RX ORDER — SODIUM CHLORIDE 9 MG/ML
INJECTION, SOLUTION INTRAVENOUS CONTINUOUS
Status: DISCONTINUED | OUTPATIENT
Start: 2023-12-11 | End: 2023-12-11 | Stop reason: HOSPADM

## 2023-12-11 RX ORDER — ASPIRIN 325 MG
325 TABLET, DELAYED RELEASE (ENTERIC COATED) ORAL 2 TIMES DAILY
Status: DISCONTINUED | OUTPATIENT
Start: 2023-12-11 | End: 2023-12-11 | Stop reason: HOSPADM

## 2023-12-11 RX ORDER — OXYCODONE HYDROCHLORIDE 5 MG/1
5 TABLET ORAL EVERY 4 HOURS PRN
Qty: 30 TABLET | Refills: 0 | Status: SHIPPED | OUTPATIENT
Start: 2023-12-11 | End: 2023-12-18

## 2023-12-11 RX ORDER — SODIUM CHLORIDE 9 MG/ML
INJECTION, SOLUTION INTRAVENOUS PRN
Status: DISCONTINUED | OUTPATIENT
Start: 2023-12-11 | End: 2023-12-11 | Stop reason: HOSPADM

## 2023-12-11 RX ORDER — BISACODYL 5 MG/1
5 TABLET, DELAYED RELEASE ORAL DAILY
Qty: 7 TABLET | Refills: 0 | Status: SHIPPED | OUTPATIENT
Start: 2023-12-11 | End: 2023-12-18

## 2023-12-11 RX ORDER — SODIUM CHLORIDE 0.9 % (FLUSH) 0.9 %
5-40 SYRINGE (ML) INJECTION EVERY 12 HOURS SCHEDULED
Status: DISCONTINUED | OUTPATIENT
Start: 2023-12-11 | End: 2023-12-11 | Stop reason: HOSPADM

## 2023-12-11 RX ORDER — DEXAMETHASONE SODIUM PHOSPHATE 4 MG/ML
8 INJECTION, SOLUTION INTRA-ARTICULAR; INTRALESIONAL; INTRAMUSCULAR; INTRAVENOUS; SOFT TISSUE ONCE
Status: DISCONTINUED | OUTPATIENT
Start: 2023-12-11 | End: 2023-12-11 | Stop reason: HOSPADM

## 2023-12-11 RX ORDER — TRAMADOL HYDROCHLORIDE 50 MG/1
100 TABLET ORAL EVERY 6 HOURS PRN
Status: DISCONTINUED | OUTPATIENT
Start: 2023-12-11 | End: 2023-12-11

## 2023-12-11 RX ORDER — ONDANSETRON 2 MG/ML
4 INJECTION INTRAMUSCULAR; INTRAVENOUS
Status: DISCONTINUED | OUTPATIENT
Start: 2023-12-11 | End: 2023-12-11 | Stop reason: HOSPADM

## 2023-12-11 RX ORDER — DEXAMETHASONE SODIUM PHOSPHATE 4 MG/ML
INJECTION, SOLUTION INTRA-ARTICULAR; INTRALESIONAL; INTRAMUSCULAR; INTRAVENOUS; SOFT TISSUE PRN
Status: DISCONTINUED | OUTPATIENT
Start: 2023-12-11 | End: 2023-12-11 | Stop reason: SDUPTHER

## 2023-12-11 RX ORDER — HYDROMORPHONE HYDROCHLORIDE 1 MG/ML
0.5 INJECTION, SOLUTION INTRAMUSCULAR; INTRAVENOUS; SUBCUTANEOUS EVERY 5 MIN PRN
Status: DISCONTINUED | OUTPATIENT
Start: 2023-12-11 | End: 2023-12-11 | Stop reason: HOSPADM

## 2023-12-11 RX ORDER — KETOROLAC TROMETHAMINE 30 MG/ML
30 INJECTION, SOLUTION INTRAMUSCULAR; INTRAVENOUS ONCE
Status: COMPLETED | OUTPATIENT
Start: 2023-12-11 | End: 2023-12-11

## 2023-12-11 RX ORDER — SODIUM CHLORIDE, SODIUM LACTATE, POTASSIUM CHLORIDE, CALCIUM CHLORIDE 600; 310; 30; 20 MG/100ML; MG/100ML; MG/100ML; MG/100ML
INJECTION, SOLUTION INTRAVENOUS CONTINUOUS
Status: DISCONTINUED | OUTPATIENT
Start: 2023-12-11 | End: 2023-12-11 | Stop reason: HOSPADM

## 2023-12-11 RX ORDER — TRANEXAMIC ACID 100 MG/ML
INJECTION, SOLUTION INTRAVENOUS PRN
Status: DISCONTINUED | OUTPATIENT
Start: 2023-12-11 | End: 2023-12-11 | Stop reason: SDUPTHER

## 2023-12-11 RX ORDER — KETOROLAC TROMETHAMINE 30 MG/ML
15 INJECTION, SOLUTION INTRAMUSCULAR; INTRAVENOUS EVERY 6 HOURS
Status: DISCONTINUED | OUTPATIENT
Start: 2023-12-11 | End: 2023-12-11 | Stop reason: HOSPADM

## 2023-12-11 RX ORDER — HYDROMORPHONE HYDROCHLORIDE 1 MG/ML
0.5 INJECTION, SOLUTION INTRAMUSCULAR; INTRAVENOUS; SUBCUTANEOUS
Status: DISCONTINUED | OUTPATIENT
Start: 2023-12-11 | End: 2023-12-11 | Stop reason: HOSPADM

## 2023-12-11 RX ORDER — TRAMADOL HYDROCHLORIDE 50 MG/1
50 TABLET ORAL EVERY 6 HOURS PRN
Status: DISCONTINUED | OUTPATIENT
Start: 2023-12-11 | End: 2023-12-11

## 2023-12-11 RX ORDER — BUPIVACAINE HYDROCHLORIDE 2.5 MG/ML
INJECTION, SOLUTION EPIDURAL; INFILTRATION; INTRACAUDAL PRN
Status: DISCONTINUED | OUTPATIENT
Start: 2023-12-11 | End: 2023-12-11 | Stop reason: SDUPTHER

## 2023-12-11 RX ORDER — MIDAZOLAM HYDROCHLORIDE 1 MG/ML
INJECTION INTRAMUSCULAR; INTRAVENOUS PRN
Status: DISCONTINUED | OUTPATIENT
Start: 2023-12-11 | End: 2023-12-11 | Stop reason: SDUPTHER

## 2023-12-11 RX ORDER — FAMOTIDINE 20 MG/1
20 TABLET, FILM COATED ORAL 2 TIMES DAILY
Status: DISCONTINUED | OUTPATIENT
Start: 2023-12-11 | End: 2023-12-11 | Stop reason: HOSPADM

## 2023-12-11 RX ORDER — FENTANYL CITRATE 50 UG/ML
25 INJECTION, SOLUTION INTRAMUSCULAR; INTRAVENOUS EVERY 5 MIN PRN
Status: COMPLETED | OUTPATIENT
Start: 2023-12-11 | End: 2023-12-11

## 2023-12-11 RX ORDER — ACETAMINOPHEN 325 MG/1
650 TABLET ORAL EVERY 6 HOURS
Status: DISCONTINUED | OUTPATIENT
Start: 2023-12-11 | End: 2023-12-11 | Stop reason: HOSPADM

## 2023-12-11 RX ORDER — DIPHENHYDRAMINE HYDROCHLORIDE 50 MG/ML
25 INJECTION INTRAMUSCULAR; INTRAVENOUS EVERY 6 HOURS PRN
Status: DISCONTINUED | OUTPATIENT
Start: 2023-12-11 | End: 2023-12-11 | Stop reason: HOSPADM

## 2023-12-11 RX ORDER — ONDANSETRON 2 MG/ML
INJECTION INTRAMUSCULAR; INTRAVENOUS PRN
Status: DISCONTINUED | OUTPATIENT
Start: 2023-12-11 | End: 2023-12-11 | Stop reason: SDUPTHER

## 2023-12-11 RX ORDER — ONDANSETRON 2 MG/ML
4 INJECTION INTRAMUSCULAR; INTRAVENOUS EVERY 6 HOURS PRN
Status: DISCONTINUED | OUTPATIENT
Start: 2023-12-11 | End: 2023-12-11 | Stop reason: HOSPADM

## 2023-12-11 RX ORDER — ROPIVACAINE HYDROCHLORIDE 5 MG/ML
INJECTION, SOLUTION EPIDURAL; INFILTRATION; PERINEURAL PRN
Status: DISCONTINUED | OUTPATIENT
Start: 2023-12-11 | End: 2023-12-11 | Stop reason: ALTCHOICE

## 2023-12-11 RX ORDER — ACETAMINOPHEN 500 MG
1000 TABLET ORAL ONCE
Status: COMPLETED | OUTPATIENT
Start: 2023-12-11 | End: 2023-12-11

## 2023-12-11 RX ORDER — PHENYLEPHRINE HCL IN 0.9% NACL 0.4MG/10ML
SYRINGE (ML) INTRAVENOUS PRN
Status: DISCONTINUED | OUTPATIENT
Start: 2023-12-11 | End: 2023-12-11 | Stop reason: SDUPTHER

## 2023-12-11 RX ORDER — SODIUM CHLORIDE 9 MG/ML
INJECTION, SOLUTION INTRAVENOUS PRN
Status: DISCONTINUED | OUTPATIENT
Start: 2023-12-11 | End: 2023-12-11 | Stop reason: SDUPTHER

## 2023-12-11 RX ORDER — FAMOTIDINE 20 MG/1
20 TABLET, FILM COATED ORAL 2 TIMES DAILY
Qty: 60 TABLET | Refills: 3 | Status: SHIPPED | OUTPATIENT
Start: 2023-12-11

## 2023-12-11 RX ORDER — METFORMIN HYDROCHLORIDE 500 MG/1
500 TABLET, EXTENDED RELEASE ORAL
Status: DISCONTINUED | OUTPATIENT
Start: 2023-12-11 | End: 2023-12-11 | Stop reason: HOSPADM

## 2023-12-11 RX ORDER — OXYCODONE HYDROCHLORIDE 5 MG/1
5 TABLET ORAL EVERY 4 HOURS PRN
Status: DISCONTINUED | OUTPATIENT
Start: 2023-12-11 | End: 2023-12-11 | Stop reason: HOSPADM

## 2023-12-11 RX ORDER — ACETAMINOPHEN 500 MG
1000 TABLET ORAL ONCE
Status: DISCONTINUED | OUTPATIENT
Start: 2023-12-11 | End: 2023-12-11 | Stop reason: SDUPTHER

## 2023-12-11 RX ORDER — ASPIRIN 325 MG
325 TABLET, DELAYED RELEASE (ENTERIC COATED) ORAL 2 TIMES DAILY
Qty: 60 TABLET | Refills: 0 | Status: SHIPPED | OUTPATIENT
Start: 2023-12-11 | End: 2024-01-10

## 2023-12-11 RX ORDER — BISACODYL 5 MG/1
5 TABLET, DELAYED RELEASE ORAL DAILY
Status: DISCONTINUED | OUTPATIENT
Start: 2023-12-11 | End: 2023-12-11 | Stop reason: HOSPADM

## 2023-12-11 RX ORDER — PROCHLORPERAZINE EDISYLATE 5 MG/ML
5 INJECTION INTRAMUSCULAR; INTRAVENOUS
Status: DISCONTINUED | OUTPATIENT
Start: 2023-12-11 | End: 2023-12-11 | Stop reason: HOSPADM

## 2023-12-11 RX ORDER — ONDANSETRON 4 MG/1
4 TABLET, ORALLY DISINTEGRATING ORAL EVERY 8 HOURS PRN
Status: DISCONTINUED | OUTPATIENT
Start: 2023-12-11 | End: 2023-12-11 | Stop reason: HOSPADM

## 2023-12-11 RX ORDER — LIDOCAINE HYDROCHLORIDE 10 MG/ML
1 INJECTION, SOLUTION EPIDURAL; INFILTRATION; INTRACAUDAL; PERINEURAL
Status: DISCONTINUED | OUTPATIENT
Start: 2023-12-11 | End: 2023-12-11 | Stop reason: HOSPADM

## 2023-12-11 RX ORDER — OXYCODONE HYDROCHLORIDE 5 MG/1
5 TABLET ORAL
Status: DISCONTINUED | OUTPATIENT
Start: 2023-12-11 | End: 2023-12-11 | Stop reason: HOSPADM

## 2023-12-11 RX ORDER — DIPHENHYDRAMINE HCL 25 MG
25 CAPSULE ORAL EVERY 6 HOURS PRN
Status: DISCONTINUED | OUTPATIENT
Start: 2023-12-11 | End: 2023-12-11 | Stop reason: HOSPADM

## 2023-12-11 RX ADMIN — FAMOTIDINE 20 MG: 20 TABLET, FILM COATED ORAL at 14:44

## 2023-12-11 RX ADMIN — ACETAMINOPHEN 1000 MG: 500 TABLET ORAL at 08:47

## 2023-12-11 RX ADMIN — DEXAMETHASONE SODIUM PHOSPHATE 8 MG: 4 INJECTION INTRA-ARTICULAR; INTRALESIONAL; INTRAMUSCULAR; INTRAVENOUS; SOFT TISSUE at 10:35

## 2023-12-11 RX ADMIN — Medication 120 MCG: at 09:59

## 2023-12-11 RX ADMIN — Medication 3 AMPULE: at 09:25

## 2023-12-11 RX ADMIN — CEFAZOLIN 3000 MG: 10 INJECTION, POWDER, FOR SOLUTION INTRAVENOUS at 17:29

## 2023-12-11 RX ADMIN — TRANEXAMIC ACID 1000 MG: 100 INJECTION, SOLUTION INTRAVENOUS at 10:01

## 2023-12-11 RX ADMIN — MIDAZOLAM HYDROCHLORIDE 2 MG: 1 INJECTION, SOLUTION INTRAMUSCULAR; INTRAVENOUS at 09:31

## 2023-12-11 RX ADMIN — BISACODYL 5 MG: 5 TABLET, COATED ORAL at 14:38

## 2023-12-11 RX ADMIN — Medication 120 MCG: at 10:20

## 2023-12-11 RX ADMIN — Medication 80 MCG: at 10:04

## 2023-12-11 RX ADMIN — FENTANYL CITRATE 25 MCG: 50 INJECTION INTRAMUSCULAR; INTRAVENOUS at 12:18

## 2023-12-11 RX ADMIN — Medication 120 MCG: at 11:09

## 2023-12-11 RX ADMIN — TRAMADOL HYDROCHLORIDE 50 MG: 50 TABLET ORAL at 12:09

## 2023-12-11 RX ADMIN — Medication 3000 MG: at 10:01

## 2023-12-11 RX ADMIN — ACETAMINOPHEN 650 MG: 325 TABLET ORAL at 14:37

## 2023-12-11 RX ADMIN — OXYCODONE 5 MG: 5 TABLET ORAL at 14:43

## 2023-12-11 RX ADMIN — Medication 80 MCG: at 10:29

## 2023-12-11 RX ADMIN — SODIUM CHLORIDE, POTASSIUM CHLORIDE, SODIUM LACTATE AND CALCIUM CHLORIDE: 600; 310; 30; 20 INJECTION, SOLUTION INTRAVENOUS at 10:42

## 2023-12-11 RX ADMIN — Medication 80 MCG: at 09:51

## 2023-12-11 RX ADMIN — FENTANYL CITRATE 25 MCG: 50 INJECTION INTRAMUSCULAR; INTRAVENOUS at 12:10

## 2023-12-11 RX ADMIN — Medication 120 MCG: at 10:23

## 2023-12-11 RX ADMIN — BUPIVACAINE HYDROCHLORIDE 15 ML: 2.5 INJECTION, SOLUTION EPIDURAL; INFILTRATION; INTRACAUDAL; PERINEURAL at 09:37

## 2023-12-11 RX ADMIN — PROPOFOL 120 MCG/KG/MIN: 10 INJECTION, EMULSION INTRAVENOUS at 09:52

## 2023-12-11 RX ADMIN — MEPIVACAINE HYDROCHLORIDE 2.6 ML: 20 INJECTION, SOLUTION EPIDURAL; INFILTRATION at 09:34

## 2023-12-11 RX ADMIN — KETOROLAC TROMETHAMINE 30 MG: 30 INJECTION, SOLUTION INTRAMUSCULAR at 09:09

## 2023-12-11 RX ADMIN — FENTANYL CITRATE 25 MCG: 50 INJECTION INTRAMUSCULAR; INTRAVENOUS at 12:25

## 2023-12-11 RX ADMIN — Medication 80 MCG: at 10:18

## 2023-12-11 RX ADMIN — Medication 120 MCG: at 10:09

## 2023-12-11 RX ADMIN — SODIUM CHLORIDE: 9 INJECTION, SOLUTION INTRAVENOUS at 14:48

## 2023-12-11 RX ADMIN — ASPIRIN 325 MG: 325 TABLET, COATED ORAL at 17:14

## 2023-12-11 RX ADMIN — FENTANYL CITRATE 25 MCG: 50 INJECTION INTRAMUSCULAR; INTRAVENOUS at 12:40

## 2023-12-11 RX ADMIN — METFORMIN HYDROCHLORIDE 500 MG: 500 TABLET, EXTENDED RELEASE ORAL at 17:14

## 2023-12-11 RX ADMIN — SODIUM CHLORIDE, POTASSIUM CHLORIDE, SODIUM LACTATE AND CALCIUM CHLORIDE: 600; 310; 30; 20 INJECTION, SOLUTION INTRAVENOUS at 09:32

## 2023-12-11 RX ADMIN — PHENYLEPHRINE HYDROCHLORIDE 40 MCG/MIN: 10 INJECTION INTRAVENOUS at 10:23

## 2023-12-11 RX ADMIN — ONDANSETRON 4 MG: 2 INJECTION INTRAMUSCULAR; INTRAVENOUS at 10:35

## 2023-12-11 RX ADMIN — KETOROLAC TROMETHAMINE 15 MG: 30 INJECTION, SOLUTION INTRAMUSCULAR; INTRAVENOUS at 14:37

## 2023-12-11 ASSESSMENT — PAIN DESCRIPTION - ORIENTATION
ORIENTATION: LEFT

## 2023-12-11 ASSESSMENT — PAIN DESCRIPTION - LOCATION
LOCATION: HIP

## 2023-12-11 ASSESSMENT — PAIN SCALES - GENERAL
PAINLEVEL_OUTOF10: 5
PAINLEVEL_OUTOF10: 3

## 2023-12-11 ASSESSMENT — PAIN DESCRIPTION - DESCRIPTORS
DESCRIPTORS: ACHING
DESCRIPTORS: SORE
DESCRIPTORS: ACHING;SORE
DESCRIPTORS: ACHING
DESCRIPTORS: ACHING

## 2023-12-11 ASSESSMENT — LIFESTYLE VARIABLES: SMOKING_STATUS: 1

## 2023-12-11 NOTE — ANESTHESIA PROCEDURE NOTES
Peripheral Block    Patient location during procedure: pre-op  Reason for block: post-op pain management and at surgeon's request  Start time: 12/11/2023 9:36 AM  End time: 12/11/2023 9:41 AM  Staffing  Performed: anesthesiologist   Anesthesiologist: Newton Nguyen MD  Performed by: Newton Nguyen MD  Authorized by:  Newton Nguyen MD    Preanesthetic Checklist  Completed: patient identified, IV checked, site marked, risks and benefits discussed, surgical/procedural consents, equipment checked, pre-op evaluation, timeout performed, anesthesia consent given, oxygen available, monitors applied/VS acknowledged, fire risk safety assessment completed and verbalized and blood product R/B/A discussed and consented  Peripheral Block   Patient position: supine  Prep: DuraPrep  Provider prep: mask, sterile gown and sterile gloves  Patient monitoring: cardiac monitor, continuous pulse ox, continuous capnometry, frequent blood pressure checks, oxygen, IV access and responsive to questions  Block type: PENG  Laterality: left  Injection technique: single-shot  Guidance: ultrasound guided    Needle   Needle type: short-bevel   Needle gauge: 21 G  Needle localization: anatomical landmarks and ultrasound guidance  Needle length: 10 cm  Assessment   Injection assessment: local visualized surrounding nerve on ultrasound, negative aspiration for heme, no paresthesia on injection and no intravascular symptoms  Slow fractionated injection: yes  Hemodynamics: stable  Real-time US image taken/store: yes  Outcomes: uncomplicated and patient tolerated procedure well    Additional Notes  Excellent visualization on US

## 2023-12-11 NOTE — ANESTHESIA PROCEDURE NOTES
Spinal Block    Patient location during procedure: pre-op  End time: 12/11/2023 9:38 AM  Reason for block: primary anesthetic and at surgeon's request  Staffing  Performed: anesthesiologist   Anesthesiologist: Federico Weaver MD  Performed by: Federico Weaver MD  Authorized by:  Federico Weaver MD    Spinal Block  Patient position: sitting  Prep: DuraPrep  Patient monitoring: frequent blood pressure checks, oxygen, cardiac monitor, continuous pulse ox and continuous capnometry  Approach: midline  Location: L3/L4  Provider prep: mask and sterile gloves  Needle  Needle type: Pencan   Needle gauge: 24 G  Needle length: 4 in  Assessment  Sensory level: T8  Swirl obtained: Yes  CSF: clear  Attempts: 1  Hemodynamics: stable  Preanesthetic Checklist  Completed: patient identified, IV checked, site marked, risks and benefits discussed, surgical/procedural consents, equipment checked, pre-op evaluation, timeout performed, anesthesia consent given, oxygen available, monitors applied/VS acknowledged, fire risk safety assessment completed and verbalized and blood product R/B/A discussed and consented

## 2023-12-11 NOTE — OP NOTE
at the greater trochanter and this did allow the proximal femur to  elevate up and out of the wound. I then used the box osteotome to clear off  metaphysical bone and the lateral cortical bone was then removed with the use of a rongeur. The canal entry broach was then used. Sequential broaching was then performed until I had good axial and rotational control. I used the calcar planer to clear off irregularities in the calcar. I trial reduced the hip with a 0 degree ball. X-rays were taken and  demonstrated excellent positioning of the femoral component. I redislocated the joint. I removed  the head and neck as well as the broach and impacted into place a final 127 degree neck angle hip stem. This did have excellent axial and  rotational control. The calcar was checked and there were no cracks or other  bone deficiencies. I, therefore, trialed a ball which did have excellent  reconstitution of the length and offset. I, therefore, redislocated, cleaned  and dried the trunnion and impacted into place our final ball with seven sharp blows of the mallet. This did have excellent fixation. I therefore relocated the joint. Final  x-rays were taken demonstrating good positioning of all components as well as  stability and no fractures were noted. Stability testing was performed with the leg at neutral, then externally rotated 90 degrees, then externally rotated at 45 degrees combined with leg extension. The hip was stable through this range of motion. The wound was then copiously  irrigated with normal saline mixed with Betadine. Soft tissues were then  infused with 0.5% Ropivacaine. The fascia was then closed with a running #1  Vicryl suture. Deep stitches were placed. Then, 2-0 Vicryl, 3-0 Monocryl,  Dermabond, and Steri-Strips were applied. A sterile dressing was applied. The patient was then taken off of the table and taken to PACU in stable  condition with no obvious intraoperative complications.

## 2023-12-11 NOTE — ANESTHESIA POSTPROCEDURE EVALUATION
Department of Anesthesiology  Postprocedure Note    Patient: Jaime Lam  MRN: [id number]  YOB: 1970  Date of evaluation: 12/11/2023      Procedure Summary       Date: 12/11/23 Room / Location: [facility] MA[facility] OR M4 / [facility] MA[facility] OR    Anesthesia Start: 0946 Anesthesia Stop: 1149    Procedure: LEFT HIP TOTAL ARTHROPLASTY ANTERIOR APPROACH (Left: Hip) Diagnosis:       Primary osteoarthritis of left hip      (Primary osteoarthritis of left hip [M16.12])    Providers: Nan Diop DO Responsible Provider: Brissa Cerda MD    Anesthesia Type: spinal ASA Status: 3            Anesthesia Type: No value filed.     Ayesha Phase I:      Ayesha Phase II:        Anesthesia Post Evaluation    Patient location during evaluation: PACU  Patient participation: complete - patient participated  Level of consciousness: awake  Pain score: 0  Airway patency: patent  Nausea & Vomiting: no nausea and no vomiting  Complications: no  Cardiovascular status: hemodynamically stable  Respiratory status: acceptable  Hydration status: stable

## 2023-12-11 NOTE — CARE COORDINATION
Planned ortho surgery patient. Patient politely declining [facility] services at this time. Pt reported she may not even do OP therapy as she had this surgery on the other hip earlier this year and feels she knows what she is doing. . CM has informed NP.      [address] [place] [address]

## 2023-12-11 NOTE — PROGRESS NOTES
Leader Rounding     Patient has RW for home use. Discussed patient pain medications and other new medications patient prescribed on discharge from hospital with their side effects. Discussed the importance of using other methods for pain control such as ice/rest/elevate, pre-medicating prior to physical therapy. Discussed the importance of being safe at hospital and home by using RW until cleared by PT, wearing safe shoes, preparing home for after surgery and having a  to help at home. Discussed risk after surgery and the importance of preventing infection by good hand hygiene, using clean towels and wash cloths at each shower, inspect wound/dressing daily, moving every two hours while awake, using the incentive spirometer every hour while awake. When to call the doctor for help, or when to call 911 for emergency. Discussed the importance of home PT, daily exercises as instructed by physical therapist and post-op appointment with surgeon and the need for transportation to this appointment until the surgeon has cleared you for driving. Opportunity given for patient to ask additional questions about any special concerns regarding your care while on the Ortho unit. Patient states class information was valuable in preparing for surgery.

## 2023-12-11 NOTE — PERIOP NOTE
Irrisept antimicrobial wound lavage   GTIN:[id number]  KXS:[id number]  [facility]:95-

## 2023-12-12 NOTE — PROGRESS NOTES
Patient discharged instructions and medication covered with patient. Patient provided care package and additional dressing with education. Patient taken to front lobby for discharge by tech to awaiting family members.

## 2023-12-27 ENCOUNTER — TELEPHONE (OUTPATIENT)
Age: 53
End: 2023-12-27

## 2023-12-27 DIAGNOSIS — Z96.642 AFTERCARE FOLLOWING LEFT HIP JOINT REPLACEMENT SURGERY: Primary | ICD-10-CM

## 2023-12-27 DIAGNOSIS — Z47.1 AFTERCARE FOLLOWING LEFT HIP JOINT REPLACEMENT SURGERY: Primary | ICD-10-CM

## 2023-12-27 RX ORDER — TRAMADOL HYDROCHLORIDE 50 MG/1
50 TABLET ORAL EVERY 6 HOURS PRN
Qty: 28 TABLET | Refills: 0 | Status: SHIPPED | OUTPATIENT
Start: 2023-12-27 | End: 2023-12-28 | Stop reason: SDUPTHER

## 2023-12-27 RX ORDER — DICLOFENAC SODIUM 75 MG/1
75 TABLET, DELAYED RELEASE ORAL 2 TIMES DAILY
Qty: 60 TABLET | Refills: 0 | Status: SHIPPED | OUTPATIENT
Start: 2023-12-27 | End: 2024-01-26

## 2023-12-27 NOTE — TELEPHONE ENCOUNTER
Patient is requesting a refill on her prescribed diclofenac and to have a pain medication sent in for discomfort in left hip.  She would like both prescriptions to be sent to her preferred pharmacy of [address] [place] [address], [address] [[id number]]

## 2023-12-28 ENCOUNTER — TELEPHONE (OUTPATIENT)
Age: 53
End: 2023-12-28

## 2023-12-28 DIAGNOSIS — Z47.1 AFTERCARE FOLLOWING LEFT HIP JOINT REPLACEMENT SURGERY: ICD-10-CM

## 2023-12-28 DIAGNOSIS — M25.552 PAIN OF LEFT HIP: ICD-10-CM

## 2023-12-28 DIAGNOSIS — Z96.642 AFTERCARE FOLLOWING LEFT HIP JOINT REPLACEMENT SURGERY: ICD-10-CM

## 2023-12-28 RX ORDER — TRAMADOL HYDROCHLORIDE 50 MG/1
50 TABLET ORAL EVERY 6 HOURS PRN
Qty: 28 TABLET | Refills: 0 | Status: SHIPPED | OUTPATIENT
Start: 2023-12-28 | End: 2024-01-04

## 2023-12-28 NOTE — TELEPHONE ENCOUNTER
Patient's pharmacy is requesting a new prescription for tramadol be submitted to them that provides the supervising prescriber's information.  The new prescription should be sent to [address] [place] [address], [address] [[id number]]

## 2023-12-29 ENCOUNTER — TELEPHONE (OUTPATIENT)
Age: 53
End: 2023-12-29

## 2023-12-29 RX ORDER — DICLOFENAC SODIUM 75 MG/1
TABLET, DELAYED RELEASE ORAL
Qty: 60 TABLET | Refills: 0 | Status: SHIPPED | OUTPATIENT
Start: 2023-12-29

## 2023-12-29 NOTE — TELEPHONE ENCOUNTER
Patient has been informed that the letter is ready for  and she states she is on her way to come pick it up.

## 2023-12-29 NOTE — TELEPHONE ENCOUNTER
Patient called in stating she would like her return to work release letter to be updated. She would like it now to say she may return to work on 01/01/2024 as she has been informed by her employer that is the date she will need to start her work trip. The patient would like a call when the letter is available. She may be reached at [phone].

## 2024-01-22 ENCOUNTER — OFFICE VISIT (OUTPATIENT)
Age: 54
End: 2024-01-22

## 2024-01-22 VITALS
TEMPERATURE: 97.4 F | DIASTOLIC BLOOD PRESSURE: 88 MMHG | BODY MASS INDEX: 49.75 KG/M2 | HEIGHT: 63 IN | OXYGEN SATURATION: 96 % | WEIGHT: 280.8 LBS | RESPIRATION RATE: 18 BRPM | SYSTOLIC BLOOD PRESSURE: 139 MMHG | HEART RATE: 88 BPM

## 2024-01-22 DIAGNOSIS — Z47.1 AFTERCARE FOLLOWING LEFT HIP JOINT REPLACEMENT SURGERY: Primary | ICD-10-CM

## 2024-01-22 DIAGNOSIS — Z96.642 AFTERCARE FOLLOWING LEFT HIP JOINT REPLACEMENT SURGERY: Primary | ICD-10-CM

## 2024-01-22 PROCEDURE — 99024 POSTOP FOLLOW-UP VISIT: CPT | Performed by: ORTHOPAEDIC SURGERY

## 2024-01-22 RX ORDER — DICLOFENAC SODIUM 75 MG/1
75 TABLET, DELAYED RELEASE ORAL 2 TIMES DAILY PRN
Qty: 60 TABLET | Refills: 0 | Status: SHIPPED | OUTPATIENT
Start: 2024-01-22

## 2024-01-22 ASSESSMENT — PATIENT HEALTH QUESTIONNAIRE - PHQ9
SUM OF ALL RESPONSES TO PHQ QUESTIONS 1-9: 0
2. FEELING DOWN, DEPRESSED OR HOPELESS: 0
SUM OF ALL RESPONSES TO PHQ QUESTIONS 1-9: 0
1. LITTLE INTEREST OR PLEASURE IN DOING THINGS: 0
SUM OF ALL RESPONSES TO PHQ QUESTIONS 1-9: 0
SUM OF ALL RESPONSES TO PHQ9 QUESTIONS 1 & 2: 0
SUM OF ALL RESPONSES TO PHQ QUESTIONS 1-9: 0

## 2024-01-22 NOTE — PROGRESS NOTES
Identified pt with two pt identifiers (name and ). Reviewed chart in preparation for visit and have obtained necessary documentation.    Lubna Garcia is a 54 y.o. female Hip Pain (Post Op Left hip)  .    Vitals:    24 1419   BP: 139/88   Site: Right Upper Arm   Position: Sitting   Cuff Size: Large Adult   Pulse: 88   Resp: 18   Temp: 97.4 °F (36.3 °C)   TempSrc: Oral   SpO2: 96%   Weight: 127.4 kg (280 lb 12.8 oz)   Height: 1.6 m (5' 3\")          1. Have you been to the ER, urgent care clinic since your last visit?  Hospitalized since your last visit?  no     2. Have you seen or consulted any other health care providers outside of the [facility] since your last visit?  Include any pap smears or colon screening.  no

## 2024-01-22 NOTE — PROGRESS NOTES
is here for a follow up visit from a total hip arthroplasty on the left side.  The patient is doing well, with no medical complications since discharge.  Pain has been appropriate since surgery,and the patient is progressing well with physical therapy.    Current Outpatient Medications on File Prior to Visit   Medication Sig Dispense Refill    diclofenac (VOLTAREN) 75 MG EC tablet TAKE 1 TABLET BY MOUTH TWICE A DAY 60 tablet 0    diclofenac (VOLTAREN) 75 MG EC tablet Take 1 tablet by mouth 2 times daily 60 tablet 0    famotidine (PEPCID) 20 MG tablet Take 1 tablet by mouth 2 times daily 60 tablet 3    acetaminophen (TYLENOL) 650 MG extended release tablet Take 2 tablets by mouth every 8 hours as needed for Pain      MAGNESIUM PO Take 1 tablet by mouth daily      metFORMIN (GLUCOPHAGE-XR) 500 MG extended release tablet Take 1 tablet by mouth Daily with supper      aspirin 325 MG EC tablet Take 1 tablet by mouth in the morning and at bedtime 60 tablet 0    diclofenac (VOLTAREN) 75 MG EC tablet Take 1 tablet by mouth 2 times daily 60 tablet 0     No current facility-administered medications on file prior to visit.       ROS:  General: denies agitation, major chest pain, unexpected weakness  Pain in the hip is managed with diclofenac.  Skin: healing wound is without issue.   Strength: appropriate weakness of involved extremity is resolving since surgery      Physical Examination:    Vitals:    01/22/24 1419   BP: 139/88   Pulse: 88   Resp: 18   Temp: 97.4 °F (36.3 °C)   SpO2: 96%           Skin: no significant drainage, no wound dehiscence  Sensation intact to light touch at level of wound and distally.   Lateral femoral cutaneous nerve function is intact  Strength is 4/5 with hip flexion and abduction  Leg lengths are clinically equal  Distal swelling is noted, but appropriate for postoperative course  Distal capillary refill less than 2 seconds      Imaging:    Postoperative xrays are reviewed, they indicate a

## 2024-03-04 ENCOUNTER — TELEPHONE (OUTPATIENT)
Age: 54
End: 2024-03-04

## 2024-03-04 DIAGNOSIS — Z47.1 AFTERCARE FOLLOWING LEFT HIP JOINT REPLACEMENT SURGERY: ICD-10-CM

## 2024-03-04 DIAGNOSIS — Z96.642 AFTERCARE FOLLOWING LEFT HIP JOINT REPLACEMENT SURGERY: ICD-10-CM

## 2024-03-04 RX ORDER — DICLOFENAC SODIUM 75 MG/1
75 TABLET, DELAYED RELEASE ORAL 2 TIMES DAILY PRN
Qty: 60 TABLET | Refills: 0 | Status: SHIPPED | OUTPATIENT
Start: 2024-03-04

## 2024-03-04 NOTE — TELEPHONE ENCOUNTER
Patient is requesting a refill on her prescription of diclofenac and to have it sent to her preferred pharmacy of [facility] #[id number] - [place], [place] - [address] - [place] [phone] - F [phone] [[id number]]

## 2024-07-01 ENCOUNTER — TELEPHONE (OUTPATIENT)
Age: 54
End: 2024-07-01

## 2024-07-01 DIAGNOSIS — Z47.1 AFTERCARE FOLLOWING LEFT HIP JOINT REPLACEMENT SURGERY: ICD-10-CM

## 2024-07-01 DIAGNOSIS — Z96.642 AFTERCARE FOLLOWING LEFT HIP JOINT REPLACEMENT SURGERY: ICD-10-CM

## 2024-07-01 RX ORDER — DICLOFENAC SODIUM 75 MG/1
75 TABLET, DELAYED RELEASE ORAL 2 TIMES DAILY PRN
Qty: 60 TABLET | Refills: 0 | Status: SHIPPED | OUTPATIENT
Start: 2024-07-01

## 2024-07-01 NOTE — TELEPHONE ENCOUNTER
Patient called in to request prescription refill Voltaren 75mg will like a confirmation call once medication is ordered [phone]

## 2024-08-10 ENCOUNTER — APPOINTMENT (OUTPATIENT)
Facility: HOSPITAL | Age: 54
End: 2024-08-10
Payer: COMMERCIAL

## 2024-08-10 ENCOUNTER — HOSPITAL ENCOUNTER (EMERGENCY)
Facility: HOSPITAL | Age: 54
Discharge: HOME | End: 2024-08-10
Attending: STUDENT IN AN ORGANIZED HEALTH CARE EDUCATION/TRAINING PROGRAM
Payer: COMMERCIAL

## 2024-08-10 VITALS
WEIGHT: 285.5 LBS | BODY MASS INDEX: 47.57 KG/M2 | TEMPERATURE: 98.2 F | OXYGEN SATURATION: 95 % | HEART RATE: 69 BPM | HEIGHT: 65 IN | RESPIRATION RATE: 20 BRPM | DIASTOLIC BLOOD PRESSURE: 72 MMHG | SYSTOLIC BLOOD PRESSURE: 129 MMHG

## 2024-08-10 DIAGNOSIS — R10.13 ABDOMINAL PAIN, EPIGASTRIC: Primary | ICD-10-CM

## 2024-08-10 DIAGNOSIS — R11.2 NAUSEA AND VOMITING, UNSPECIFIED VOMITING TYPE: ICD-10-CM

## 2024-08-10 LAB
ALBUMIN SERPL-MCNC: 3.4 G/DL (ref 3.5–5)
ALBUMIN/GLOB SERPL: 0.9 (ref 1.1–2.2)
ALP SERPL-CCNC: 81 U/L (ref 45–117)
ALT SERPL-CCNC: 69 U/L (ref 12–78)
ANION GAP SERPL CALC-SCNC: 7 MMOL/L (ref 5–15)
APPEARANCE UR: CLEAR
AST SERPL-CCNC: 23 U/L (ref 15–37)
BACTERIA URNS QL MICRO: ABNORMAL /HPF
BASOPHILS # BLD: 0.1 K/UL (ref 0–0.1)
BASOPHILS NFR BLD: 1 % (ref 0–1)
BILIRUB SERPL-MCNC: 0.5 MG/DL (ref 0.2–1)
BILIRUB UR QL: NEGATIVE
BUN SERPL-MCNC: 13 MG/DL (ref 6–20)
BUN/CREAT SERPL: 16 (ref 12–20)
CALCIUM SERPL-MCNC: 9.6 MG/DL (ref 8.5–10.1)
CHLORIDE SERPL-SCNC: 108 MMOL/L (ref 97–108)
CO2 SERPL-SCNC: 25 MMOL/L (ref 21–32)
COLOR UR: ABNORMAL
CREAT SERPL-MCNC: 0.8 MG/DL (ref 0.55–1.02)
DIFFERENTIAL METHOD BLD: NORMAL
EKG ATRIAL RATE: 98 BPM
EKG DIAGNOSIS: NORMAL
EKG P AXIS: 52 DEGREES
EKG P-R INTERVAL: 152 MS
EKG Q-T INTERVAL: 344 MS
EKG QRS DURATION: 78 MS
EKG QTC CALCULATION (BAZETT): 439 MS
EKG R AXIS: 46 DEGREES
EKG T AXIS: 63 DEGREES
EKG VENTRICULAR RATE: 98 BPM
EOSINOPHIL # BLD: 0.4 K/UL (ref 0–0.4)
EOSINOPHIL NFR BLD: 4 % (ref 0–7)
EPITH CASTS URNS QL MICRO: ABNORMAL /LPF
ERYTHROCYTE [DISTWIDTH] IN BLOOD BY AUTOMATED COUNT: 12.7 % (ref 11.5–14.5)
GLOBULIN SER CALC-MCNC: 3.7 G/DL (ref 2–4)
GLUCOSE SERPL-MCNC: 142 MG/DL (ref 65–100)
GLUCOSE UR STRIP.AUTO-MCNC: NEGATIVE MG/DL
HCT VFR BLD AUTO: 41.8 % (ref 35–47)
HGB BLD-MCNC: 14.3 G/DL (ref 11.5–16)
HGB UR QL STRIP: NEGATIVE
HYALINE CASTS URNS QL MICRO: ABNORMAL /LPF (ref 0–2)
IMM GRANULOCYTES # BLD AUTO: 0 K/UL (ref 0–0.04)
IMM GRANULOCYTES NFR BLD AUTO: 0 % (ref 0–0.5)
KETONES UR QL STRIP.AUTO: NEGATIVE MG/DL
LEUKOCYTE ESTERASE UR QL STRIP.AUTO: NEGATIVE
LIPASE SERPL-CCNC: 40 U/L (ref 13–75)
LYMPHOCYTES # BLD: 2.5 K/UL (ref 0.8–3.5)
LYMPHOCYTES NFR BLD: 26 % (ref 12–49)
MCH RBC QN AUTO: 32.3 PG (ref 26–34)
MCHC RBC AUTO-ENTMCNC: 34.2 G/DL (ref 30–36.5)
MCV RBC AUTO: 94.4 FL (ref 80–99)
MONOCYTES # BLD: 1 K/UL (ref 0–1)
MONOCYTES NFR BLD: 10 % (ref 5–13)
NEUTS SEG # BLD: 5.7 K/UL (ref 1.8–8)
NEUTS SEG NFR BLD: 59 % (ref 32–75)
NITRITE UR QL STRIP.AUTO: NEGATIVE
NRBC # BLD: 0 K/UL (ref 0–0.01)
NRBC BLD-RTO: 0 PER 100 WBC
PH UR STRIP: 6 (ref 5–8)
PLATELET # BLD AUTO: 280 K/UL (ref 150–400)
PMV BLD AUTO: 11 FL (ref 8.9–12.9)
POTASSIUM SERPL-SCNC: 3.7 MMOL/L (ref 3.5–5.1)
PROT SERPL-MCNC: 7.1 G/DL (ref 6.4–8.2)
PROT UR STRIP-MCNC: NEGATIVE MG/DL
RBC # BLD AUTO: 4.43 M/UL (ref 3.8–5.2)
RBC #/AREA URNS HPF: ABNORMAL /HPF (ref 0–5)
SODIUM SERPL-SCNC: 140 MMOL/L (ref 136–145)
SP GR UR REFRACTOMETRY: 1.02
URINE CULTURE IF INDICATED: ABNORMAL
UROBILINOGEN UR QL STRIP.AUTO: 1 EU/DL (ref 0.2–1)
WBC # BLD AUTO: 9.7 K/UL (ref 3.6–11)
WBC URNS QL MICRO: ABNORMAL /HPF (ref 0–4)

## 2024-08-10 PROCEDURE — 2500000003 HC RX 250 WO HCPCS: Performed by: STUDENT IN AN ORGANIZED HEALTH CARE EDUCATION/TRAINING PROGRAM

## 2024-08-10 PROCEDURE — 93005 ELECTROCARDIOGRAM TRACING: CPT | Performed by: STUDENT IN AN ORGANIZED HEALTH CARE EDUCATION/TRAINING PROGRAM

## 2024-08-10 PROCEDURE — 99285 EMERGENCY DEPT VISIT HI MDM: CPT

## 2024-08-10 PROCEDURE — 83690 ASSAY OF LIPASE: CPT

## 2024-08-10 PROCEDURE — 96361 HYDRATE IV INFUSION ADD-ON: CPT

## 2024-08-10 PROCEDURE — 6360000002 HC RX W HCPCS: Performed by: STUDENT IN AN ORGANIZED HEALTH CARE EDUCATION/TRAINING PROGRAM

## 2024-08-10 PROCEDURE — 74177 CT ABD & PELVIS W/CONTRAST: CPT

## 2024-08-10 PROCEDURE — 81001 URINALYSIS AUTO W/SCOPE: CPT

## 2024-08-10 PROCEDURE — 85025 COMPLETE CBC W/AUTO DIFF WBC: CPT

## 2024-08-10 PROCEDURE — 36415 COLL VENOUS BLD VENIPUNCTURE: CPT

## 2024-08-10 PROCEDURE — 2580000003 HC RX 258: Performed by: STUDENT IN AN ORGANIZED HEALTH CARE EDUCATION/TRAINING PROGRAM

## 2024-08-10 PROCEDURE — 6360000004 HC RX CONTRAST MEDICATION: Performed by: EMERGENCY MEDICINE

## 2024-08-10 PROCEDURE — 80053 COMPREHEN METABOLIC PANEL: CPT

## 2024-08-10 PROCEDURE — 96375 TX/PRO/DX INJ NEW DRUG ADDON: CPT

## 2024-08-10 PROCEDURE — 96374 THER/PROPH/DIAG INJ IV PUSH: CPT

## 2024-08-10 RX ORDER — SODIUM CHLORIDE, SODIUM LACTATE, POTASSIUM CHLORIDE, AND CALCIUM CHLORIDE .6; .31; .03; .02 G/100ML; G/100ML; G/100ML; G/100ML
1000 INJECTION, SOLUTION INTRAVENOUS ONCE
Status: COMPLETED | OUTPATIENT
Start: 2024-08-10 | End: 2024-08-10

## 2024-08-10 RX ORDER — KETOROLAC TROMETHAMINE 30 MG/ML
15 INJECTION, SOLUTION INTRAMUSCULAR; INTRAVENOUS ONCE
Status: COMPLETED | OUTPATIENT
Start: 2024-08-10 | End: 2024-08-10

## 2024-08-10 RX ORDER — ONDANSETRON 4 MG/1
4 TABLET, ORALLY DISINTEGRATING ORAL 3 TIMES DAILY PRN
Qty: 8 TABLET | Refills: 0 | Status: SHIPPED | OUTPATIENT
Start: 2024-08-10

## 2024-08-10 RX ORDER — ONDANSETRON 2 MG/ML
4 INJECTION INTRAMUSCULAR; INTRAVENOUS ONCE
Status: COMPLETED | OUTPATIENT
Start: 2024-08-10 | End: 2024-08-10

## 2024-08-10 RX ORDER — PANTOPRAZOLE SODIUM 40 MG/1
40 TABLET, DELAYED RELEASE ORAL
Qty: 30 TABLET | Refills: 1 | Status: SHIPPED | OUTPATIENT
Start: 2024-08-10

## 2024-08-10 RX ORDER — MORPHINE SULFATE 2 MG/ML
2 INJECTION, SOLUTION INTRAMUSCULAR; INTRAVENOUS
Status: COMPLETED | OUTPATIENT
Start: 2024-08-10 | End: 2024-08-10

## 2024-08-10 RX ADMIN — MORPHINE SULFATE 2 MG: 2 INJECTION, SOLUTION INTRAMUSCULAR; INTRAVENOUS at 04:33

## 2024-08-10 RX ADMIN — SODIUM CHLORIDE, POTASSIUM CHLORIDE, SODIUM LACTATE AND CALCIUM CHLORIDE 1000 ML: 600; 310; 30; 20 INJECTION, SOLUTION INTRAVENOUS at 04:33

## 2024-08-10 RX ADMIN — ONDANSETRON 4 MG: 2 INJECTION INTRAMUSCULAR; INTRAVENOUS at 04:33

## 2024-08-10 RX ADMIN — FAMOTIDINE 20 MG: 10 INJECTION INTRAVENOUS at 05:56

## 2024-08-10 RX ADMIN — KETOROLAC TROMETHAMINE 15 MG: 30 INJECTION, SOLUTION INTRAMUSCULAR at 04:33

## 2024-08-10 RX ADMIN — IOPAMIDOL 100 ML: 755 INJECTION, SOLUTION INTRAVENOUS at 05:19

## 2024-08-10 ASSESSMENT — PAIN DESCRIPTION - FREQUENCY: FREQUENCY: INTERMITTENT

## 2024-08-10 ASSESSMENT — PAIN SCALES - GENERAL: PAINLEVEL_OUTOF10: 9

## 2024-08-10 ASSESSMENT — PAIN DESCRIPTION - DESCRIPTORS: DESCRIPTORS: ACHING

## 2024-08-10 ASSESSMENT — PAIN - FUNCTIONAL ASSESSMENT
PAIN_FUNCTIONAL_ASSESSMENT: ACTIVITIES ARE NOT PREVENTED
PAIN_FUNCTIONAL_ASSESSMENT: 0-10

## 2024-08-10 ASSESSMENT — PAIN DESCRIPTION - LOCATION: LOCATION: ABDOMEN

## 2024-08-10 ASSESSMENT — PAIN DESCRIPTION - ORIENTATION: ORIENTATION: MID

## 2024-08-10 NOTE — ED PROVIDER NOTES
[facility] EMERGENCY DEPT  [address] [address] [phone]    If symptoms worsen       DISCHARGE MEDICATIONS:     Medication List        START taking these medications      ondansetron 4 MG disintegrating tablet  Commonly known as: ZOFRAN-ODT  Take 1 tablet by mouth 3 times daily as needed for Nausea or Vomiting     pantoprazole 40 MG tablet  Commonly known as: PROTONIX  Take 1 tablet by mouth every morning (before breakfast)            ASK your doctor about these medications      acetaminophen 650 MG extended release tablet  Commonly known as: TYLENOL     aspirin 325 MG EC tablet  Take 1 tablet by mouth in the morning and at bedtime     * diclofenac 75 MG EC tablet  Commonly known as: VOLTAREN  Take 1 tablet by mouth 2 times daily     * diclofenac 75 MG EC tablet  Commonly known as: VOLTAREN  Take 1 tablet by mouth 2 times daily     * diclofenac 75 MG EC tablet  Commonly known as: VOLTAREN  TAKE 1 TABLET BY MOUTH TWICE A DAY     * diclofenac 75 MG EC tablet  Commonly known as: VOLTAREN  Take 1 tablet by mouth 2 times daily as needed for Pain     famotidine 20 MG tablet  Commonly known as: PEPCID  Take 1 tablet by mouth 2 times daily     MAGNESIUM PO     metFORMIN 500 MG extended release tablet  Commonly known as: GLUCOPHAGE-XR           * This list has 4 medication(s) that are the same as other medications prescribed for you. Read the directions carefully, and ask your doctor or other care provider to review them with you.                   Where to Get Your Medications        These medications were sent to [facility]/pharmacy #[id number] - [place], [place] - [address] - P [phone] - F [phone]  [address] [facility] [address]      Hours: 24-hours Phone: [phone]   ondansetron 4 MG disintegrating tablet  pantoprazole 40 MG tablet           DISCONTINUED MEDICATIONS:  Current Discharge Medication List          I am the Primary Clinician of Record.   Omi XIE

## 2024-08-12 ENCOUNTER — TELEPHONE (OUTPATIENT)
Age: 54
End: 2024-08-12

## 2024-08-12 NOTE — TELEPHONE ENCOUNTER
Patient contacted and stated she has a bilateral hip replacement and is having a hard time losing weight. Patient scheduled for a virtual visit on 8/13/24 AT 12 noon. Provider approved. Patient agreed to appointment date and time.

## 2024-08-12 NOTE — TELEPHONE ENCOUNTER
Patient states that she would like to get a virtual appointment with Eliza regarding back ache weight loss she can be reached @ [phone]

## 2024-08-13 ENCOUNTER — PATIENT MESSAGE (OUTPATIENT)
Age: 54
End: 2024-08-13

## 2024-08-13 ENCOUNTER — TELEMEDICINE (OUTPATIENT)
Age: 54
End: 2024-08-13
Payer: COMMERCIAL

## 2024-08-13 DIAGNOSIS — E66.01 MORBID (SEVERE) OBESITY DUE TO EXCESS CALORIES (HCC): Primary | ICD-10-CM

## 2024-08-13 PROCEDURE — 99213 OFFICE O/P EST LOW 20 MIN: CPT | Performed by: NURSE PRACTITIONER

## 2024-08-13 RX ORDER — METFORMIN HYDROCHLORIDE 500 MG/1
500 TABLET, EXTENDED RELEASE ORAL
Qty: 90 TABLET | Refills: 1 | Status: SHIPPED | OUTPATIENT
Start: 2024-08-13

## 2024-08-13 ASSESSMENT — PATIENT HEALTH QUESTIONNAIRE - PHQ9
SUM OF ALL RESPONSES TO PHQ QUESTIONS 1-9: 0
1. LITTLE INTEREST OR PLEASURE IN DOING THINGS: NOT AT ALL
SUM OF ALL RESPONSES TO PHQ9 QUESTIONS 1 & 2: 0
2. FEELING DOWN, DEPRESSED OR HOPELESS: NOT AT ALL
SUM OF ALL RESPONSES TO PHQ QUESTIONS 1-9: 0

## 2024-08-13 ASSESSMENT — ENCOUNTER SYMPTOMS
BACK PAIN: 1
GASTROINTESTINAL NEGATIVE: 1
RESPIRATORY NEGATIVE: 1

## 2024-08-13 NOTE — PROGRESS NOTES
Lubna Garcia (:  1970) is a Established patient, presenting virtually for evaluation of the following:    Assessment & Plan   Below is the assessment and plan developed based on review of pertinent history, physical exam, labs, studies, and medications.  1. Morbid (severe) obesity due to excess calories (HCC) - patient to check with insurance to see if she has coverage for weight loss medications, such as Wegovy and Zepbound.  If she does, she can send message for prescription.  If not, can consider referral to nutrition/dietitian  - Provided nutritional and dietary counseling. Encouraged patient to drink water, avoid Liquid calories. Encouraged patient to make healthy dietary changes, encouraged patient to increase fruits/veggies, avoidance of fast foods, fried foods.  Encouraged patient to follow low carbohydrate diet, reduce calories by 30%, small portions        - Discussed behavior modification. Discussed with patient to make small, achievable changes for better success with weight loss.          - Encouraged patient to increase exercise - aim for 150 minutes of aerobic activity weekly      Patient to schedule follow up with new provider in next 3-4 months as I will be leaving the practice on 24.  Patient provided with a list of primary care providers in the area to schedule with.      Subjective   HPI  patient is seen virtually for weight management    Now working for [employer]    Needs help with weight management.  Since bilateral hip surgeries, she has been more mobile and active.  Has a sedentary job  She has hard time losing weight  Interested in Zepbound  Hips and low back will get tight with a lot of walking  Tried keto diet for last 3 weeks - caused constipation    Taking metformin.  A1c 5.8% on preop labs.  ,  Allergies   Allergen Reactions    Other Swelling     Allergy to jicama root       Past Medical History:   Diagnosis Date    Arthritis     hips    Insulin resistance

## 2024-08-13 NOTE — PROGRESS NOTES
Chief Complaint   Patient presents with    Weight Management    Back Pain       \"Have you been to the ER, urgent care clinic since your last visit?  Hospitalized since your last visit?\"    YES - When: approximately 3 days ago.  Where and Why: [facility] for Abdominal pain.    “Have you seen or consulted any other health care providers outside of [facility] since your last visit?”    NO     “Have you had a pap smear?”    NO    No cervical cancer screening on file         “Have you had a colorectal cancer screening such as a colonoscopy/FIT/Cologuard?    NO    No colonoscopy on file  No cologuard on file  No FIT/FOBT on file   No flexible sigmoidoscopy on file         Click Here for Release of Records Request       There were no vitals filed for this visit.   Health Maintenance Due   Topic Date Due    HIV screen  Never done    Hepatitis C screen  Never done    Hepatitis B vaccine (1 of 3 - 19+ 3-dose series) Never done    Cervical cancer screen  Never done    Colorectal Cancer Screen  Never done    Shingles vaccine (2 of 2) 2022    COVID-19 Vaccine (2023- season) 2023    Flu vaccine (1) 2024        The patient, Lubna Garcia, identity was verified by name and .

## 2024-08-21 ENCOUNTER — CLINICAL DOCUMENTATION (OUTPATIENT)
Age: 54
End: 2024-08-21

## 2024-08-21 NOTE — PROGRESS NOTES
PA/Medical necessity determination electronically faxed to [facility] [phone].

## 2024-12-10 ENCOUNTER — TELEPHONE (OUTPATIENT)
Age: 54
End: 2024-12-10

## 2024-12-10 NOTE — TELEPHONE ENCOUNTER
----- Message from Alex VERNON sent at 12/9/2024  3:43 PM EST -----  Regarding: [facility] Appointment Request  [facility] Appointment Request    Patient needs appointment for [facility] Appointment Type: New Patient.    Patient Requested Dates(s): 12/27 or 12/26, or 12/23  Patient Requested Time: No specific  Provider Name: Dr. Sanya Charlton    Reason for Appointment Request: New Patient - Available appointments did not meet patient need    Additional Info : Pt would like to schedule an appointment and establish care with Dr. Sanya Charlton preferably on 12/23/24, 12/27/24 or 12/26/24.  --------------------------------------------------------------------------------------------------------------------------    Relationship to Patient: Self     Call Back Information: OK to leave message on voicemail  Preferred Call Back Number: Phone [phone]

## 2025-03-31 ENCOUNTER — OFFICE VISIT (OUTPATIENT)
Age: 55
End: 2025-03-31
Payer: COMMERCIAL

## 2025-03-31 VITALS
SYSTOLIC BLOOD PRESSURE: 118 MMHG | DIASTOLIC BLOOD PRESSURE: 81 MMHG | HEART RATE: 91 BPM | OXYGEN SATURATION: 99 % | WEIGHT: 291 LBS | HEIGHT: 64 IN | BODY MASS INDEX: 49.68 KG/M2 | TEMPERATURE: 97.8 F | RESPIRATION RATE: 20 BRPM

## 2025-03-31 DIAGNOSIS — E11.9 TYPE 2 DIABETES MELLITUS WITHOUT COMPLICATION, WITHOUT LONG-TERM CURRENT USE OF INSULIN: ICD-10-CM

## 2025-03-31 DIAGNOSIS — J30.9 ALLERGIC RHINITIS, UNSPECIFIED SEASONALITY, UNSPECIFIED TRIGGER: ICD-10-CM

## 2025-03-31 DIAGNOSIS — Z12.11 COLON CANCER SCREENING: ICD-10-CM

## 2025-03-31 DIAGNOSIS — E66.01 OBESITY, MORBID: Primary | ICD-10-CM

## 2025-03-31 DIAGNOSIS — E88.819 INSULIN RESISTANCE: ICD-10-CM

## 2025-03-31 PROCEDURE — 99204 OFFICE O/P NEW MOD 45 MIN: CPT | Performed by: INTERNAL MEDICINE

## 2025-03-31 RX ORDER — ONDANSETRON 4 MG/1
4 TABLET, ORALLY DISINTEGRATING ORAL 3 TIMES DAILY PRN
Qty: 21 TABLET | Refills: 0 | Status: SHIPPED | OUTPATIENT
Start: 2025-03-31

## 2025-03-31 NOTE — PROGRESS NOTES
PROGRESS NOTE  Name: Lubna Garcia   : 1970       ASSESSMENT/ PLAN:     Lubna was seen today for established new doctor and weight loss.    Insulin resistance / Type 2 diabetes mellitus without complication, without long-term current use of insulin (HCC): It appears she has met criteria as 5 of her last 8 gluc were above 125 (most were fasting).  Start GLP-1 agent. Consider metformin as well down the road (For now I'd prefer to start only one agent at a time).   -     CBC with Auto Differential; Future  -     Comprehensive Metabolic Panel; Future  -     Lipid Panel; Future  -     Hemoglobin A1C; Future  -     Albumin/Creatinine Ratio, Urine; Future  -     Semaglutide,0.25 or 0.5MG/DOS, 2 MG/3ML SOPN; Inject 0.25 mg into the skin every 7 days    Obesity, morbid: Discussed diet, exercise. GLP-1 agent may help as well.   -     TSH; Future    Allergic rhinitis, unspecified seasonality, unspecified trigger: OTC antihistamine. Consider adding flonase.     Colon cancer screening:  -     [facility] - Evie Roy MD, [facility] ([facility] [address])    Other orders  -     ondansetron (ZOFRAN-ODT) 4 MG disintegrating tablet; Take 1 tablet by mouth 3 times daily as needed for Nausea or Vomiting    Return in about 6 months (around 2025) for Diabetes Mellitus.     I have reviewed the patient's medications and risks/side effects/benefits were discussed. Diagnosis(-es) explained to patient and questions answered. Literature provided where appropriate.                    SUBJECTIVE:   Ms. Lubna Garcia is a 55 y.o. female who presents today for follow up.  Previously she saw Eliza Muniz NP.     Chief Complaint   Patient presents with    Established New Doctor    Weight Loss     She has noted bicep pain lately, \"nagging feeling.\" Sometimes it radiates down into lateral elbow. \"I'm a  and I wonder if it's related to holding the steering wheel.\"     Her allergies are flaring up. Takes \"generic OTC allergy

## 2025-04-01 ENCOUNTER — RESULTS FOLLOW-UP (OUTPATIENT)
Age: 55
End: 2025-04-01

## 2025-04-01 LAB
ALBUMIN SERPL-MCNC: 3.4 G/DL (ref 3.5–5)
ALBUMIN/GLOB SERPL: 0.9 (ref 1.1–2.2)
ALP SERPL-CCNC: 106 U/L (ref 45–117)
ALT SERPL-CCNC: 55 U/L (ref 12–78)
ANION GAP SERPL CALC-SCNC: 7 MMOL/L (ref 2–12)
AST SERPL-CCNC: 41 U/L (ref 15–37)
BASOPHILS # BLD: 0.07 K/UL (ref 0–0.1)
BASOPHILS NFR BLD: 0.7 % (ref 0–1)
BILIRUB SERPL-MCNC: 0.5 MG/DL (ref 0.2–1)
BUN SERPL-MCNC: 8 MG/DL (ref 6–20)
BUN/CREAT SERPL: 12 (ref 12–20)
CALCIUM SERPL-MCNC: 9.4 MG/DL (ref 8.5–10.1)
CHLORIDE SERPL-SCNC: 106 MMOL/L (ref 97–108)
CHOLEST SERPL-MCNC: 185 MG/DL
CO2 SERPL-SCNC: 25 MMOL/L (ref 21–32)
CREAT SERPL-MCNC: 0.69 MG/DL (ref 0.55–1.02)
CREAT UR-MCNC: 102 MG/DL
DIFFERENTIAL METHOD BLD: ABNORMAL
EOSINOPHIL # BLD: 0.53 K/UL (ref 0–0.4)
EOSINOPHIL NFR BLD: 5.5 % (ref 0–7)
ERYTHROCYTE [DISTWIDTH] IN BLOOD BY AUTOMATED COUNT: 12.4 % (ref 11.5–14.5)
EST. AVERAGE GLUCOSE BLD GHB EST-MCNC: 126 MG/DL
GLOBULIN SER CALC-MCNC: 3.8 G/DL (ref 2–4)
GLUCOSE SERPL-MCNC: 104 MG/DL (ref 65–100)
HBA1C MFR BLD: 6 % (ref 4–5.6)
HCT VFR BLD AUTO: 40.5 % (ref 35–47)
HDLC SERPL-MCNC: 47 MG/DL
HDLC SERPL: 3.9 (ref 0–5)
HGB BLD-MCNC: 13.5 G/DL (ref 11.5–16)
IMM GRANULOCYTES # BLD AUTO: 0.03 K/UL (ref 0–0.04)
IMM GRANULOCYTES NFR BLD AUTO: 0.3 % (ref 0–0.5)
LDLC SERPL CALC-MCNC: 116.2 MG/DL (ref 0–100)
LYMPHOCYTES # BLD: 2.7 K/UL (ref 0.8–3.5)
LYMPHOCYTES NFR BLD: 28.2 % (ref 12–49)
MCH RBC QN AUTO: 32.6 PG (ref 26–34)
MCHC RBC AUTO-ENTMCNC: 33.3 G/DL (ref 30–36.5)
MCV RBC AUTO: 97.8 FL (ref 80–99)
MICROALBUMIN UR-MCNC: 1.24 MG/DL
MICROALBUMIN/CREAT UR-RTO: 12 MG/G (ref 0–30)
MONOCYTES # BLD: 0.86 K/UL (ref 0–1)
MONOCYTES NFR BLD: 9 % (ref 5–13)
NEUTS SEG # BLD: 5.38 K/UL (ref 1.8–8)
NEUTS SEG NFR BLD: 56.3 % (ref 32–75)
NRBC # BLD: 0 K/UL (ref 0–0.01)
NRBC BLD-RTO: 0 PER 100 WBC
PLATELET # BLD AUTO: 270 K/UL (ref 150–400)
PMV BLD AUTO: 11 FL (ref 8.9–12.9)
POTASSIUM SERPL-SCNC: 3.9 MMOL/L (ref 3.5–5.1)
PROT SERPL-MCNC: 7.2 G/DL (ref 6.4–8.2)
RBC # BLD AUTO: 4.14 M/UL (ref 3.8–5.2)
SODIUM SERPL-SCNC: 138 MMOL/L (ref 136–145)
SPECIMEN HOLD: NORMAL
TRIGL SERPL-MCNC: 109 MG/DL
TSH SERPL DL<=0.05 MIU/L-ACNC: 2.28 UIU/ML (ref 0.36–3.74)
VLDLC SERPL CALC-MCNC: 21.8 MG/DL
WBC # BLD AUTO: 9.6 K/UL (ref 3.6–11)

## 2025-04-08 ENCOUNTER — TELEPHONE (OUTPATIENT)
Age: 55
End: 2025-04-08

## 2025-04-08 NOTE — TELEPHONE ENCOUNTER
Pt states would like a call from clinical staff in regards to lab results.       Pt states also needs PA for Semaglutide,0.25 or 0.5MG/DOS, 2 MG/3ML SOPN

## 2025-04-17 NOTE — TELEPHONE ENCOUNTER
([place]: [facility]) - [id number]  Wegovy 0.25MG/0.5ML auto-injectors  status: Question ResponseCreated: April 17th, 2025

## 2025-04-17 NOTE — TELEPHONE ENCOUNTER
Pt states would like an update, states has not been able to  medication and is not getting a response from office.     Please call to discuss.

## 2025-04-17 NOTE — TELEPHONE ENCOUNTER
Called and left generic message on 4/17 on # [phone] number listed, voicemail says its for Farzaneh.

## 2025-06-26 SDOH — ECONOMIC STABILITY: FOOD INSECURITY: WITHIN THE PAST 12 MONTHS, YOU WORRIED THAT YOUR FOOD WOULD RUN OUT BEFORE YOU GOT MONEY TO BUY MORE.: NEVER TRUE

## 2025-06-26 SDOH — ECONOMIC STABILITY: INCOME INSECURITY: IN THE LAST 12 MONTHS, WAS THERE A TIME WHEN YOU WERE NOT ABLE TO PAY THE MORTGAGE OR RENT ON TIME?: NO

## 2025-06-26 SDOH — ECONOMIC STABILITY: FOOD INSECURITY: WITHIN THE PAST 12 MONTHS, THE FOOD YOU BOUGHT JUST DIDN'T LAST AND YOU DIDN'T HAVE MONEY TO GET MORE.: NEVER TRUE

## 2025-06-26 SDOH — ECONOMIC STABILITY: TRANSPORTATION INSECURITY
IN THE PAST 12 MONTHS, HAS LACK OF TRANSPORTATION KEPT YOU FROM MEETINGS, WORK, OR FROM GETTING THINGS NEEDED FOR DAILY LIVING?: NO

## 2025-06-26 SDOH — ECONOMIC STABILITY: TRANSPORTATION INSECURITY
IN THE PAST 12 MONTHS, HAS THE LACK OF TRANSPORTATION KEPT YOU FROM MEDICAL APPOINTMENTS OR FROM GETTING MEDICATIONS?: NO

## 2025-06-27 ENCOUNTER — TELEMEDICINE (OUTPATIENT)
Age: 55
End: 2025-06-27
Payer: COMMERCIAL

## 2025-06-27 ENCOUNTER — TELEPHONE (OUTPATIENT)
Age: 55
End: 2025-06-27

## 2025-06-27 DIAGNOSIS — E66.01 OBESITY, MORBID (HCC): ICD-10-CM

## 2025-06-27 DIAGNOSIS — R10.9 ABDOMINAL CRAMPING: ICD-10-CM

## 2025-06-27 DIAGNOSIS — G47.33 OSA (OBSTRUCTIVE SLEEP APNEA): Primary | ICD-10-CM

## 2025-06-27 PROCEDURE — 99214 OFFICE O/P EST MOD 30 MIN: CPT | Performed by: INTERNAL MEDICINE

## 2025-06-27 RX ORDER — DICYCLOMINE HCL 20 MG
20 TABLET ORAL 4 TIMES DAILY
Qty: 120 TABLET | Refills: 5 | Status: SHIPPED | OUTPATIENT
Start: 2025-06-27

## 2025-06-27 ASSESSMENT — PATIENT HEALTH QUESTIONNAIRE - PHQ9
1. LITTLE INTEREST OR PLEASURE IN DOING THINGS: NOT AT ALL
SUM OF ALL RESPONSES TO PHQ QUESTIONS 1-9: 0
2. FEELING DOWN, DEPRESSED OR HOPELESS: NOT AT ALL
SUM OF ALL RESPONSES TO PHQ QUESTIONS 1-9: 0

## 2025-06-27 NOTE — PROGRESS NOTES
Lubna Garcia is a 55 y.o. female who was seen by synchronous (real-time) audio-video technology on 2025 for Sleep Apnea (Discuss CPAP)        Progress Note         PROGRESS NOTE  Name: Lubna Garcia   : 1970       ASSESSMENT/ PLAN:     Lubna was seen today for sleep apnea.    ROSA (obstructive sleep apnea): Sounds like she needs a CPAP titration study.   -     [facility] - Sarahi Nascimento MD, Sleep Medicine, [place]    Abdominal cramping: Agree with GI follow up.   -     dicyclomine (BENTYL) 20 MG tablet; Take 1 tablet by mouth 4 times daily    Obesity, morbid (HCC): She has had some early success with keto diet. Continue efforts.     Return in about 6 months (around 2025) for Diabetes Mellitus.     I have reviewed the patient's medications and risks/side effects/benefits were discussed. Diagnosis(-es) explained to patient and questions answered. Literature provided where appropriate.                       SUBJECTIVE  Ms. Lubna Garcia presents today acutely for     Chief Complaint   Patient presents with    Sleep Apnea     Discuss CPAP       She is out of work, and has been diagnosed with sleep apnea by a home study, needs a CPAP machine. She was working with a sleep clinic called \"[facility]\".     She is a  of [employer] for [employer], and is required to be on CPAP for 7 days before she can return to work.     She has been having abdominal pain that comes and goes. \"At times it is intense\". Her stools have been \"really thin\". She has made GI appointments.     She is now on keto diet, and has lost 5 lb.     Past Medical History:   Diagnosis Date    Arthritis     hips    Insulin resistance     Obesity     Struggle for awhile    Osteoarthritis     W/ hip replacements    Sleep apnea     Recently    Spinal stenosis 2016     Current Outpatient Medications on File Prior to Visit   Medication Sig Dispense Refill    acetaminophen (TYLENOL) 650 MG extended release tablet Take 2 tablets by mouth every 8

## 2025-06-27 NOTE — PROGRESS NOTES
Have you been to the ER, urgent care clinic since your last visit?  Hospitalized since your last visit?   no    Have you seen or consulted any other health care providers outside our system since your last visit?   no     “Have you had a pap smear?”    no    No cervical cancer screening on file       “Have you had a colorectal cancer screening such as a colonoscopy/FIT/Cologuard?    no    No colonoscopy on file  No cologuard on file  No FIT/FOBT on file   No flexible sigmoidoscopy on file

## 2025-06-27 NOTE — TELEPHONE ENCOUNTER
Patient called office to inquire about scheduling an appointment. Provided patient with soonest available appointment in 10/2025. Patient did not wish to schedule due to appointment being too far away.

## (undated) DEVICE — SUTURE MCRYL SZ 3-0 L27IN ABSRB UD L24MM PS-1 3/8 CIR PRIM Y936H

## (undated) DEVICE — 3M™ TEGADERM™ TRANSPARENT FILM DRESSING FRAME STYLE, 1626W, 4 IN X 4-3/4 IN (10 CM X 12 CM), 50/CT 4CT/CASE: Brand: 3M™ TEGADERM™

## (undated) DEVICE — SUTURE PDS II SZ 1 L36IN ABSRB VLT L48MM CTX 1/2 CIR Z371T

## (undated) DEVICE — HOOD: Brand: FLYTE

## (undated) DEVICE — GOWN,SIRUS,NONRNF,XLN/2XL,18/CS: Brand: MEDLINE

## (undated) DEVICE — BLADE ES L6IN ELASTOMERIC COAT EXT DURABLE BEND UPTO 90DEG

## (undated) DEVICE — SOLUTION IRRIG 1000ML 0.9% SOD CHL USP POUR PLAS BTL

## (undated) DEVICE — GLOVE SURG SZ 85 L12IN FNGR THK79MIL GRN LTX FREE

## (undated) DEVICE — SOLUTION IRRIG 1000ML STRL H2O USP PLAS POUR BTL

## (undated) DEVICE — SHEET, DRAPE, SPLIT, STERILE: Brand: MEDLINE

## (undated) DEVICE — KIT POS FOAM HANA TBL

## (undated) DEVICE — TOTAL JOINT-MRMC: Brand: MEDLINE INDUSTRIES, INC.

## (undated) DEVICE — YANKAUER,SMOOTH HANDLE,HIGH CAPACITY: Brand: MEDLINE INDUSTRIES, INC.

## (undated) DEVICE — STAPLER INT DIA5MM 25 ABSRB STRP FIX DISP FOR HERN MESH

## (undated) DEVICE — PICO 7 DUAL 15X20CM: Brand: PICO™ 7

## (undated) DEVICE — GARMENT,MEDLINE,DVT,INT,CALF,MED, GEN2: Brand: MEDLINE

## (undated) DEVICE — STRYKER PERFORMANCE SERIES SAGITTAL BLADE: Brand: STRYKER PERFORMANCE SERIES

## (undated) DEVICE — GLOVE ORANGE PI 7 1/2   MSG9075

## (undated) DEVICE — PICO 7 10CM X 20CM: Brand: PICO™ 7

## (undated) DEVICE — STAPLER SKN FIX HD COUNT STRL -- SUB STAPLER35WA  6/CA $58.16

## (undated) DEVICE — SUTURE MCRYL SZ 4-0 L27IN ABSRB UD L19MM PS-2 1/2 CIR PRIM Y426H

## (undated) DEVICE — SOLUTION SURG PREP 26 CC PURPREP

## (undated) DEVICE — DRAPE,U/ SHT,SPLIT,PLAS,STERIL: Brand: MEDLINE

## (undated) DEVICE — SYR 10ML LUER LOK 1/5ML GRAD --

## (undated) DEVICE — SOLUTION SCRB 4% CHG RED ANTIMIC SKIN CLN PREOPERATIVE DISP

## (undated) DEVICE — NEEDLE HYPO 22GA L1.5IN BLK S STL HUB POLYPR SHLD REG BVL

## (undated) DEVICE — 3M™ IOBAN™ 2 ANTIMICROBIAL INCISE DRAPE 6651EZ: Brand: IOBAN™ 2

## (undated) DEVICE — GLOVE SURG SZ 8 L12IN FNGR THK79MIL GRN LTX FREE

## (undated) DEVICE — TROCAR: Brand: KII FIOS FIRST ENTRY

## (undated) DEVICE — TOTAL TRAY, 16FR 10ML SIL FOLEY, URN: Brand: MEDLINE

## (undated) DEVICE — SNAP KOVER: Brand: UNBRANDED

## (undated) DEVICE — SUTURE VCRL SZ 1 L36IN ABSRB UD L36MM CT-1 1/2 CIR J947H

## (undated) DEVICE — BRUSH SCRB 4% CHG RED DISP --

## (undated) DEVICE — TRANSFER SET 3": Brand: MEDLINE INDUSTRIES, INC.

## (undated) DEVICE — 450 ML BOTTLE OF 0.05% CHLORHEXIDINE GLUCONATE IN 99.95% STERILE WATER FOR IRRIGATION, USP AND APPLICATOR.: Brand: IRRISEPT ANTIMICROBIAL WOUND LAVAGE

## (undated) DEVICE — GENERAL LAPAROSCOPY-MRMC: Brand: MEDLINE INDUSTRIES, INC.

## (undated) DEVICE — GLOVE ORANGE PI 8   MSG9080

## (undated) DEVICE — Device: Brand: JELCO

## (undated) DEVICE — SUTURE VCRL SZ 2-0 L36IN ABSRB UD L36MM CT-1 1/2 CIR J945H

## (undated) DEVICE — SUTURE GORTX CV2 THX26 36IN 2N06A 2N06A

## (undated) DEVICE — GLOVE ORTHO 7 1/2   MSG9475

## (undated) DEVICE — TUBING INSUF 0.3UM FLTR W/ LUERLOCK CONN

## (undated) DEVICE — LIQUIBAND RAPID ADHESIVE 36/CS 0.8ML: Brand: MEDLINE

## (undated) DEVICE — STRIP,CLOSURE,WOUND,MEDI-STRIP,1/2X4: Brand: MEDLINE

## (undated) DEVICE — STAPLER SKIN H3.9MM WIRE DIA0.58MM CRWN 6.9MM 35 STPL ROT

## (undated) DEVICE — SUTURE SZ 0 27IN 5/8 CIR UR-6  TAPER PT VIOLET ABSRB VICRYL J603H

## (undated) DEVICE — GLOVE SURG SZ 85 L12IN FNGR ORTHO 126MIL CRM LTX FREE

## (undated) DEVICE — SOL IRRIGATION INJ NACL 0.9% 500ML BTL